# Patient Record
Sex: FEMALE | Race: OTHER | HISPANIC OR LATINO | Employment: FULL TIME | URBAN - METROPOLITAN AREA
[De-identification: names, ages, dates, MRNs, and addresses within clinical notes are randomized per-mention and may not be internally consistent; named-entity substitution may affect disease eponyms.]

---

## 2017-01-04 ENCOUNTER — TELEPHONE (OUTPATIENT)
Dept: OBGYN | Facility: CLINIC | Age: 22
End: 2017-01-04

## 2017-01-04 NOTE — TELEPHONE ENCOUNTER
Pt called triage line stating she has had a cold for 3 days, pt states needs a note for work or a prescription. Called pt unable to contact her left message to call back.

## 2017-01-11 ENCOUNTER — ROUTINE PRENATAL (OUTPATIENT)
Dept: OBGYN | Facility: CLINIC | Age: 22
End: 2017-01-11

## 2017-01-11 VITALS — WEIGHT: 217 LBS | SYSTOLIC BLOOD PRESSURE: 124 MMHG | BODY MASS INDEX: 33.98 KG/M2 | DIASTOLIC BLOOD PRESSURE: 72 MMHG

## 2017-01-11 DIAGNOSIS — Z34.82 ENCOUNTER FOR SUPERVISION OF OTHER NORMAL PREGNANCY IN SECOND TRIMESTER: Primary | ICD-10-CM

## 2017-01-11 PROCEDURE — 90040 PR PRENATAL FOLLOW UP: CPT | Performed by: PHYSICIAN ASSISTANT

## 2017-01-11 NOTE — MR AVS SNAPSHOT
Lisbeth Hernández   2017 4:15 PM   Routine Prenatal   MRN: [id number]    [facility]   Dept Phone:  [phone]    Description:  Female : 1995   Provider:  DALI Funez           Allergies as of 2017     Allergen Noted Reactions    Sulfa Drugs 2013       Family history of allergy, pt has not taken      Vital Signs     Blood Pressure Weight Last Menstrual Period Smoking Status          124/72 mmHg 98.431 kg (217 lb) 2016 (Approximate) Former Smoker        Basic Information     Date Of Birth Sex Race Ethnicity Preferred Language    1995 Female  or   Origin (Haitian,French,Croatian,Citizen of [place], etc) English      Your appointments     2017  4:15 PM   OB Follow Up with DALI Funez   [facility] [address]  [facility] [id number] [phone]              Problem List              ICD-10-CM Priority Class Noted - Resolved    Supervision of normal pregnancy in second trimester Z34.92   10/18/2016 - Present    Low grade squamous intraepithelial lesion on cytologic smear of cervix (lgsil) - for repeat pap/colpo PRN 6 weeks PP R87.612   10/31/2016 - Present      Health Maintenance        Date Due Completion Dates    IMM HEP B VACCINE (1 of 3 - Primary Series) 1995 ---    IMM HEP A VACCINE (1 of 2 - Standard Series) 1996 ---    IMM HPV VACCINE (1 of 3 - Female 3 Dose Series) 2006 ---    IMM VARICELLA (CHICKENPOX) VACCINE (1 of 2 - 2 Dose Adolescent Series) 2008 ---    IMM MENINGOCOCCAL VACCINE (MCV4) (1 of 1) 2011 ---    IMM INFLUENZA (1) 2016 ---    PAP SMEAR 10/18/2019 10/18/2016    IMM DTaP/Tdap/Td Vaccine (2 - Td) 2026            Current Immunizations     Tdap Vaccine 2016  4:18 PM      Below and/or attached are the medications your provider expects you to take. Review all of your home medications and newly ordered medications with  your provider and/or pharmacist. Follow medication instructions as directed by your provider and/or pharmacist. Please keep your medication list with you and share with your provider. Update the information when medications are discontinued, doses are changed, or new medications (including over-the-counter products) are added; and carry medication information at all times in the event of emergency situations     Allergies:  SULFA DRUGS - (reactions not documented)               Medications  Valid as of: January 11, 2017 -  4:08 PM    Generic Name Brand Name Tablet Size Instructions for use    Prenatal Vit-Fe Fumarate-FA   Take  by mouth.        .                 Medicines prescribed today were sent to:     [facility]/PHARMACY #[id number] - [place], [place] - [address] [place] [address]    Phone: [phone] Fax: [phone]    Open 24 Hours?: No      Medication refill instructions:       If your prescription bottle indicates you have medication refills left, it is not necessary to call your provider’s office. Please contact your pharmacy and they will refill your medication.    If your prescription bottle indicates you do not have any refills left, you may request refills at any time through one of the following ways: The online [employer] system (except Urgent Care), by calling your provider’s office, or by asking your pharmacy to contact your provider’s office with a refill request. Medication refills are processed only during regular business hours and may not be available until the next business day. Your provider may request additional information or to have a follow-up visit with you prior to refilling your medication.   *Please Note: Medication refills are assigned a new Rx number when refilled electronically. Your pharmacy may indicate that no refills were authorized even though a new prescription for the same medication is available at the pharmacy. Please request the  medicine by name with the pharmacy before contacting your provider for a refill.        Other Notes About Your Plan     TODD Larios           India Property Online Access Code: [email or web address]  Expires: 1/20/2017  9:01 AM    India Property Online  A secure, online tool to manage your health information     [employer] India Property Online® is a secure, online tool that connects you to your personalized health information from the privacy of your home -- day or night - making it very easy for you to manage your healthcare. Once the activation process is completed, you can even access your medical information using the India Property Online rogelio, which is available for free in the Apple Rogelio store or Google Play store.     India Property Online provides the following levels of access (as shown below):   My Chart Features   [facility] Doctor [facility]  Specialists [facility]  Urgent  Care Non-[facility]  Primary Care  Doctor   Email your healthcare team securely and privately 24/7 X X X    Manage appointments: schedule your next appointment; view details of past/upcoming appointments X      Request prescription refills. X      View recent personal medical records, including lab and immunizations X X X X   View health record, including health history, allergies, medications X X X X   Read reports about your outpatient visits, procedures, consult and ER notes X X X X   See your discharge summary, which is a recap of your hospital and/or ER visit that includes your diagnosis, lab results, and care plan. X X       How to register for India Property Online:  1. Go to  [email or web address].  2. Click on the Sign Up Now box, which takes you to the New Member Sign Up page. You will need to provide the following information:  a. Enter your India Property Online Access Code exactly as it appears at the top of this page. (You will not need to use this code after you’ve completed the sign-up process. If you do not sign up before the expiration date, you must request a new code.)   b. Enter your date of  birth.   c. Enter your home email address.   d. Click Submit, and follow the next screen’s instructions.  3. Create a Carbon60 Networks ID. This will be your [employer] login ID and cannot be changed, so think of one that is secure and easy to remember.  4. Create a Merus Labst password. You can change your password at any time.  5. Enter your Password Reset Question and Answer. This can be used at a later time if you forget your password.   6. Enter your e-mail address. This allows you to receive e-mail notifications when new information is available in [employer]60 Networks.  7. Click Sign Up. You can now view your health information.    For assistance activating your [employer] account, call [phone]

## 2017-01-11 NOTE — PROGRESS NOTES
Pt. Here for OB/FU today. Reports Good FM.   Good # [phone]  Pt states having pelvic pain.   Pt states was seen at [facility] on 1/1/17 for abdominal pain.   Pharmacy verified.

## 2017-01-12 NOTE — PROGRESS NOTES
Pt has no complaints with cramping, UCs, VB, LOF, but pt has had cough and cold symptoms for past week. Taking some medication and feeling somewhat better. +FM. PTL precautions reviewed. RTC 2 wk or sooner prn.

## 2017-01-24 ENCOUNTER — ROUTINE PRENATAL (OUTPATIENT)
Dept: OBGYN | Facility: CLINIC | Age: 22
End: 2017-01-24

## 2017-01-24 VITALS — DIASTOLIC BLOOD PRESSURE: 66 MMHG | BODY MASS INDEX: 33.82 KG/M2 | SYSTOLIC BLOOD PRESSURE: 108 MMHG | WEIGHT: 216 LBS

## 2017-01-24 DIAGNOSIS — Z34.82 ENCOUNTER FOR SUPERVISION OF OTHER NORMAL PREGNANCY IN SECOND TRIMESTER: ICD-10-CM

## 2017-01-24 PROCEDURE — 90040 PR PRENATAL FOLLOW UP: CPT | Performed by: NURSE PRACTITIONER

## 2017-01-24 NOTE — MR AVS SNAPSHOT
Lisbeth Hernández   2017 4:00 PM   Routine Prenatal   MRN: [id number]    Department[facility]   Dept Phone:  [phone]    Description:  Female : 1995   Provider:  Seda Perez D.N.P.           Allergies as of 2017     Allergen Noted Reactions    Sulfa Drugs 2013       Family history of allergy, pt has not taken      You were diagnosed with     Encounter for supervision of other normal pregnancy in second trimester   [[id number]]         Vital Signs     Blood Pressure Weight Last Menstrual Period Smoking Status          108/66 mmHg 97.977 kg (216 lb) 2016 (Approximate) Former Smoker        Basic Information     Date Of Birth Sex Race Ethnicity Preferred Language    1995 Female  or   Origin (Kazakh,Montserratian,Northern Irish,Jose Manuel, etc) English      Problem List              ICD-10-CM Priority Class Noted - Resolved    Supervision of normal pregnancy in second trimester Z34.92   10/18/2016 - Present    Low grade squamous intraepithelial lesion on cytologic smear of cervix (lgsil) - for repeat pap/colpo PRN 6 weeks PP R87.612   10/31/2016 - Present      Health Maintenance        Date Due Completion Dates    IMM HEP B VACCINE (1 of 3 - Primary Series) 1995 ---    IMM HEP A VACCINE (1 of 2 - Standard Series) 1996 ---    IMM HPV VACCINE (1 of 3 - Female 3 Dose Series) 2006 ---    IMM VARICELLA (CHICKENPOX) VACCINE (1 of 2 - 2 Dose Adolescent Series) 2008 ---    IMM MENINGOCOCCAL VACCINE (MCV4) (1 of 1) 2011 ---    IMM INFLUENZA (1) 2016 ---    PAP SMEAR 10/18/2019 10/18/2016    IMM DTaP/Tdap/Td Vaccine (2 - Td) 2026            Current Immunizations     Tdap Vaccine 2016  4:18 PM      Below and/or attached are the medications your provider expects you to take. Review all of your home medications and newly ordered medications with your provider and/or pharmacist. Follow medication instructions as  directed by your provider and/or pharmacist. Please keep your medication list with you and share with your provider. Update the information when medications are discontinued, doses are changed, or new medications (including over-the-counter products) are added; and carry medication information at all times in the event of emergency situations     Allergies:  SULFA DRUGS - (reactions not documented)               Medications  Valid as of: January 24, 2017 -  4:16 PM    Generic Name Brand Name Tablet Size Instructions for use    Prenatal Vit-Fe Fumarate-FA   Take  by mouth.        .                 Medicines prescribed today were sent to:     [facility]/PHARMACY #[id number] - [place], [place] - [address] [place] [address]    Phone: [phone] Fax: [phone]    Open 24 Hours?: No      Medication refill instructions:       If your prescription bottle indicates you have medication refills left, it is not necessary to call your provider’s office. Please contact your pharmacy and they will refill your medication.    If your prescription bottle indicates you do not have any refills left, you may request refills at any time through one of the following ways: The online [employer] system (except Urgent Care), by calling your provider’s office, or by asking your pharmacy to contact your provider’s office with a refill request. Medication refills are processed only during regular business hours and may not be available until the next business day. Your provider may request additional information or to have a follow-up visit with you prior to refilling your medication.   *Please Note: Medication refills are assigned a new Rx number when refilled electronically. Your pharmacy may indicate that no refills were authorized even though a new prescription for the same medication is available at the pharmacy. Please request the medicine by name with the pharmacy before contacting your provider  for a refill.        Other Notes About Your Plan     TODD Larios           Nitro PDF Access Code: [email or web address]  Expires: 2/20/2017  9:33 AM    Nitro PDF  A secure, online tool to manage your health information     [employer]’s Nitro PDF® is a secure, online tool that connects you to your personalized health information from the privacy of your home -- day or night - making it very easy for you to manage your healthcare. Once the activation process is completed, you can even access your medical information using the Nitro PDF rogelio, which is available for free in the Apple Rogelio store or Google Play store.     Nitro PDF provides the following levels of access (as shown below):   My Chart Features   [facility] Doctor [facility]  Specialists [facility]  Urgent  Care Non-[facility]  Primary Care  Doctor   Email your healthcare team securely and privately 24/7 X X X    Manage appointments: schedule your next appointment; view details of past/upcoming appointments X      Request prescription refills. X      View recent personal medical records, including lab and immunizations X X X X   View health record, including health history, allergies, medications X X X X   Read reports about your outpatient visits, procedures, consult and ER notes X X X X   See your discharge summary, which is a recap of your hospital and/or ER visit that includes your diagnosis, lab results, and care plan. X X       How to register for Nitro PDF:  1. Go to  [email or web address].  2. Click on the Sign Up Now box, which takes you to the New Member Sign Up page. You will need to provide the following information:  a. Enter your Nitro PDF Access Code exactly as it appears at the top of this page. (You will not need to use this code after you’ve completed the sign-up process. If you do not sign up before the expiration date, you must request a new code.)   b. Enter your date of birth.   c. Enter your home email address.   d. Click Submit, and  follow the next screen’s instructions.  3. Create a Altocomt ID. This will be your Breather login ID and cannot be changed, so think of one that is secure and easy to remember.  4. Create a Altocomt password. You can change your password at any time.  5. Enter your Password Reset Question and Answer. This can be used at a later time if you forget your password.   6. Enter your e-mail address. This allows you to receive e-mail notifications when new information is available in Breather.  7. Click Sign Up. You can now view your health information.    For assistance activating your Breather account, call [phone]

## 2017-01-25 NOTE — PROGRESS NOTES
S) Pt is a 21 y.o.   at 32w6d  gestation. Routine prenatal care today. C/o leg cramps and feeling light headed at times. No other reported issues.  Reports good  fetal movement. Denies cramping, bleeding or leaking of fluid. Denies dysuria. Generally feels well today. Good self-care activities identified. Denies headaches.     O) see flow         Filed Vitals:    17 1603   BP: 108/66   Weight: 97.977 kg (216 lb)           Lab: normal prenatal panel       Pertinent ultrasound - normal fetal survey            A) IUP at 32w6d       S=D         Patient Active Problem List    Diagnosis Date Noted   • Low grade squamous intraepithelial lesion on cytologic smear of cervix (lgsil) - for repeat pap/colpo PRN 6 weeks PP 10/31/2016   • Supervision of normal pregnancy in second trimester 10/18/2016     P) s/s  labor vs general discomforts. Fetal movements reviewed. General ed and anticipatory guidance. Nutrition/exercise/vitamin. Plans breast. Plans pp contraception - states is doing her research. Continue PNV. Small frequent meals with protein, increase H20. Leg cramp relief measures.

## 2017-01-25 NOTE — PROGRESS NOTES
Pt here today for OB follow up  Pt states having leg cramps, and dizziness.   Reports +FM  WT:216lb  BP:108/66  Good # [phone]

## 2017-02-07 ENCOUNTER — ROUTINE PRENATAL (OUTPATIENT)
Dept: OBGYN | Facility: CLINIC | Age: 22
End: 2017-02-07

## 2017-02-07 VITALS — WEIGHT: 219 LBS | DIASTOLIC BLOOD PRESSURE: 60 MMHG | BODY MASS INDEX: 34.29 KG/M2 | SYSTOLIC BLOOD PRESSURE: 112 MMHG

## 2017-02-07 DIAGNOSIS — Z34.02 ENCOUNTER FOR SUPERVISION OF NORMAL FIRST PREGNANCY IN SECOND TRIMESTER: ICD-10-CM

## 2017-02-07 LAB
APPEARANCE UR: NORMAL
BILIRUB UR STRIP-MCNC: NORMAL MG/DL
COLOR UR AUTO: NORMAL
GLUCOSE UR STRIP.AUTO-MCNC: NEGATIVE MG/DL
KETONES UR STRIP.AUTO-MCNC: NEGATIVE MG/DL
LEUKOCYTE ESTERASE UR QL STRIP.AUTO: NORMAL
NITRITE UR QL STRIP.AUTO: NEGATIVE
PH UR STRIP.AUTO: 6.5 [PH] (ref 5–8)
PROT UR QL STRIP: NORMAL MG/DL
RBC UR QL AUTO: NEGATIVE
SP GR UR STRIP.AUTO: 1.02
UROBILINOGEN UR STRIP-MCNC: NORMAL MG/DL

## 2017-02-07 PROCEDURE — 81002 URINALYSIS NONAUTO W/O SCOPE: CPT | Performed by: NURSE PRACTITIONER

## 2017-02-07 PROCEDURE — 90040 PR PRENATAL FOLLOW UP: CPT | Performed by: NURSE PRACTITIONER

## 2017-02-07 NOTE — MR AVS SNAPSHOT
Lisbeth Hernández   2017 4:15 PM   Routine Prenatal   MRN: [id number]    Department[facility]   Dept Phone:  [phone]    Description:  Female : 1995   Provider:  Seda Perez D.N.P.           Allergies as of 2017     Allergen Noted Reactions    Sulfa Drugs 2013       Family history of allergy, pt has not taken      You were diagnosed with     Encounter for supervision of normal first pregnancy in second trimester   [[id number]]         Vital Signs     Blood Pressure Weight Last Menstrual Period Smoking Status          112/60 mmHg 99.338 kg (219 lb) 2016 (Approximate) Former Smoker        Basic Information     Date Of Birth Sex Race Ethnicity Preferred Language    1995 Female  or   Origin (Comoran,Omani,Angolan,Citizen of [place], etc) English      Problem List              ICD-10-CM Priority Class Noted - Resolved    Supervision of normal pregnancy in second trimester Z34.92   10/18/2016 - Present    Low grade squamous intraepithelial lesion on cytologic smear of cervix (lgsil) - for repeat pap/colpo PRN 6 weeks PP R87.612   10/31/2016 - Present      Health Maintenance        Date Due Completion Dates    IMM HEP B VACCINE (1 of 3 - Primary Series) 1995 ---    IMM HEP A VACCINE (1 of 2 - Standard Series) 1996 ---    IMM HPV VACCINE (1 of 3 - Female 3 Dose Series) 2006 ---    IMM VARICELLA (CHICKENPOX) VACCINE (1 of 2 - 2 Dose Adolescent Series) 2008 ---    IMM MENINGOCOCCAL VACCINE (MCV4) (1 of 1) 2011 ---    IMM INFLUENZA (1) 2016 ---    PAP SMEAR 10/18/2019 10/18/2016    IMM DTaP/Tdap/Td Vaccine (2 - Td) 2026            Results     POCT Urinalysis                   Current Immunizations     Tdap Vaccine 2016  4:18 PM      Below and/or attached are the medications your provider expects you to take. Review all of your home medications and newly ordered medications with your provider and/or  pharmacist. Follow medication instructions as directed by your provider and/or pharmacist. Please keep your medication list with you and share with your provider. Update the information when medications are discontinued, doses are changed, or new medications (including over-the-counter products) are added; and carry medication information at all times in the event of emergency situations     Allergies:  SULFA DRUGS - (reactions not documented)               Medications  Valid as of: February 07, 2017 -  4:34 PM    Generic Name Brand Name Tablet Size Instructions for use    Prenatal Vit-Fe Fumarate-FA   Take  by mouth.        .                 Medicines prescribed today were sent to:     [facility]/PHARMACY #[id number] - [place], [place] - [address] [place] [address]    Phone: [phone] Fax: [phone]    Open 24 Hours?: No      Medication refill instructions:       If your prescription bottle indicates you have medication refills left, it is not necessary to call your provider’s office. Please contact your pharmacy and they will refill your medication.    If your prescription bottle indicates you do not have any refills left, you may request refills at any time through one of the following ways: The online [employer] system (except Urgent Care), by calling your provider’s office, or by asking your pharmacy to contact your provider’s office with a refill request. Medication refills are processed only during regular business hours and may not be available until the next business day. Your provider may request additional information or to have a follow-up visit with you prior to refilling your medication.   *Please Note: Medication refills are assigned a new Rx number when refilled electronically. Your pharmacy may indicate that no refills were authorized even though a new prescription for the same medication is available at the pharmacy. Please request the medicine by name with  the pharmacy before contacting your provider for a refill.        Other Notes About Your Plan     TODD Larios           YouLike Access Code: [email or web address]  Expires: 2/20/2017  9:33 AM    YouLike  A secure, online tool to manage your health information     [employer]’s YouLike® is a secure, online tool that connects you to your personalized health information from the privacy of your home -- day or night - making it very easy for you to manage your healthcare. Once the activation process is completed, you can even access your medical information using the YouLike rogelio, which is available for free in the Apple Rogelio store or Google Play store.     YouLike provides the following levels of access (as shown below):   My Chart Features   [facility] Doctor [facility]  Specialists [facility]  Urgent  Care Non-[facility]  Primary Care  Doctor   Email your healthcare team securely and privately 24/7 X X X    Manage appointments: schedule your next appointment; view details of past/upcoming appointments X      Request prescription refills. X      View recent personal medical records, including lab and immunizations X X X X   View health record, including health history, allergies, medications X X X X   Read reports about your outpatient visits, procedures, consult and ER notes X X X X   See your discharge summary, which is a recap of your hospital and/or ER visit that includes your diagnosis, lab results, and care plan. X X       How to register for YouLike:  1. Go to  [email or web address]  2. Click on the Sign Up Now box, which takes you to the New Member Sign Up page. You will need to provide the following information:  a. Enter your YouLike Access Code exactly as it appears at the top of this page. (You will not need to use this code after you’ve completed the sign-up process. If you do not sign up before the expiration date, you must request a new code.)   b. Enter your date of birth.   c. Enter your  home email address.   d. Click Submit, and follow the next screen’s instructions.  3. Create a Built Int ID. This will be your Icarus Ascending login ID and cannot be changed, so think of one that is secure and easy to remember.  4. Create a Built Int password. You can change your password at any time.  5. Enter your Password Reset Question and Answer. This can be used at a later time if you forget your password.   6. Enter your e-mail address. This allows you to receive e-mail notifications when new information is available in Icarus Ascending.  7. Click Sign Up. You can now view your health information.    For assistance activating your Icarus Ascending account, call [phone]

## 2017-02-08 NOTE — PROGRESS NOTES
"Pt here today for OB follow up  Reports +FM  WT: 219 lb  BP: 112/60  Pt states still feeling dizzy and nauseated, also states that every time she \"pees\" she feels pelvic pressure.   Good # [phone]    "

## 2017-02-08 NOTE — PROGRESS NOTES
S) Pt is a 21 y.o.   at 34w6d  gestation. Routine prenatal care today. States pressure when urinating. Denies dysuria. Denies vaginal itching, burning, odor or abnormal discharge.  Reports good  fetal movement. Denies cramping, bleeding or leaking of fluid. Generally feels well today. Good self-care activities identified. Denies headaches. Supportive FOB present and involved    O) see flow         Filed Vitals:    17 1611   BP: 112/60   Weight: 99.338 kg (219 lb)           Lab: normal prenatal panel, normal glucose.        Pertinent ultrasound - normal fetal survey         Clean catch UA no nitrites or blood. Small leuks.       A) IUP at 34w6d       S=D         Patient Active Problem List    Diagnosis Date Noted   • Low grade squamous intraepithelial lesion on cytologic smear of cervix (lgsil) - for repeat pap/colpo PRN 6 weeks PP 10/31/2016   • Supervision of normal pregnancy in second trimester 10/18/2016     Likely pressure when urinating from vertex position. Not likely UTI as patient otherwise has no sx.     P) s/s ptl vs general discomforts. Fetal movements reviewed. General ed and anticipatory guidance. Nutrition/exercise/vitamin. Plans breast. Continue PNV. Anticipate GBS next visit.

## 2017-02-17 ENCOUNTER — ROUTINE PRENATAL (OUTPATIENT)
Dept: OBGYN | Facility: CLINIC | Age: 22
End: 2017-02-17

## 2017-02-17 ENCOUNTER — HOSPITAL ENCOUNTER (OUTPATIENT)
Facility: MEDICAL CENTER | Age: 22
End: 2017-02-17
Attending: PHYSICIAN ASSISTANT
Payer: COMMERCIAL

## 2017-02-17 VITALS — BODY MASS INDEX: 34.45 KG/M2 | WEIGHT: 220 LBS | DIASTOLIC BLOOD PRESSURE: 80 MMHG | SYSTOLIC BLOOD PRESSURE: 122 MMHG

## 2017-02-17 DIAGNOSIS — Z34.02 ENCOUNTER FOR SUPERVISION OF NORMAL FIRST PREGNANCY IN SECOND TRIMESTER: ICD-10-CM

## 2017-02-17 PROCEDURE — 90040 PR PRENATAL FOLLOW UP: CPT | Performed by: PHYSICIAN ASSISTANT

## 2017-02-17 PROCEDURE — 87653 STREP B DNA AMP PROBE: CPT

## 2017-02-17 NOTE — MR AVS SNAPSHOT
Lisbeth Hernández   2017 4:30 PM   Routine Prenatal   MRN: [id number]    [facility]   Dept Phone:  [phone]    Description:  Female : 1995   Provider:  DALI Funez           Allergies as of 2017     Allergen Noted Reactions    Sulfa Drugs 2013       Family history of allergy, pt has not taken      You were diagnosed with     Encounter for supervision of normal first pregnancy in second trimester   [189668]         Vital Signs     Blood Pressure Weight Last Menstrual Period Smoking Status          122/80 mmHg 99.791 kg (220 lb) 2016 (Approximate) Former Smoker        Basic Information     Date Of Birth Sex Race Ethnicity Preferred Language    1995 Female  or   Origin (Chinese,Malawian,Malawian,Citizen of [place], etc) English      Your appointments     2017  4:30 PM   OB Follow Up with Seda Perez D.N.P.   [facility] [address]  [place] [address]   [phone]            Mar 03, 2017  4:00 PM   OB Follow Up with Kaelyn Vigil C.N.M.   [facility] [address]  [place] [address]   [phone]            Mar 10, 2017  3:45 PM   OB Follow Up with Kaelyn Vigil C.N.M.   [facility] [address]  [place] [address]   [phone]              Problem List              ICD-10-CM Priority Class Noted - Resolved    Supervision of normal pregnancy in second trimester Z34.92   10/18/2016 - Present    Low grade squamous intraepithelial lesion on cytologic smear of cervix (lgsil) - for repeat pap/colpo PRN 6 weeks PP R87.612   10/31/2016 - Present      Health Maintenance        Date Due Completion Dates    IMM HEP B VACCINE (1 of 3 - Primary Series) 1995 ---    IMM HEP A VACCINE (1 of 2 - Standard Series) 1996 ---    IMM HPV VACCINE (1 of 3 - Female 3 Dose Series) 2006 ---    IMM VARICELLA (CHICKENPOX) VACCINE  (1 of 2 - 2 Dose Adolescent Series) 5/24/2008 ---    IMM MENINGOCOCCAL VACCINE (MCV4) (1 of 1) 5/24/2011 ---    IMM INFLUENZA (1) 9/1/2016 ---    PAP SMEAR 10/18/2019 10/18/2016    IMM DTaP/Tdap/Td Vaccine (2 - Td) 12/28/2026 12/28/2016            Current Immunizations     Tdap Vaccine 12/28/2016  4:18 PM      Below and/or attached are the medications your provider expects you to take. Review all of your home medications and newly ordered medications with your provider and/or pharmacist. Follow medication instructions as directed by your provider and/or pharmacist. Please keep your medication list with you and share with your provider. Update the information when medications are discontinued, doses are changed, or new medications (including over-the-counter products) are added; and carry medication information at all times in the event of emergency situations     Allergies:  SULFA DRUGS - (reactions not documented)               Medications  Valid as of: February 17, 2017 -  4:50 PM    Generic Name Brand Name Tablet Size Instructions for use    Prenatal Vit-Fe Fumarate-FA   Take  by mouth.        .                 Medicines prescribed today were sent to:     [facility]/PHARMACY #[id number] - [place], [place] - [address] [address]    Phone: [phone] Fax: [phone]    Open 24 Hours?: No      Medication refill instructions:       If your prescription bottle indicates you have medication refills left, it is not necessary to call your provider’s office. Please contact your pharmacy and they will refill your medication.    If your prescription bottle indicates you do not have any refills left, you may request refills at any time through one of the following ways: The online C[employer] system (except Urgent Care), by calling your provider’s office, or by asking your pharmacy to contact your provider’s office with a refill request. Medication refills are processed only  during regular business hours and may not be available until the next business day. Your provider may request additional information or to have a follow-up visit with you prior to refilling your medication.   *Please Note: Medication refills are assigned a new Rx number when refilled electronically. Your pharmacy may indicate that no refills were authorized even though a new prescription for the same medication is available at the pharmacy. Please request the medicine by name with the pharmacy before contacting your provider for a refill.        Your To Do List     Future Labs/Procedures Complete By Expires    GRP B STREP, BY PCR (GOMEZ BROTH)  As directed 2/17/2018      Other Notes About Your Plan     TODD Larios           VHX Access Code: [email or web address]  Expires: 2/20/2017  9:33 AM    VHX  A secure, online tool to manage your health information     [employer]s VHX® is a secure, online tool that connects you to your personalized health information from the privacy of your home -- day or night - making it very easy for you to manage your healthcare. Once the activation process is completed, you can even access your medical information using the VHX rogelio, which is available for free in the Apple Rogelio store or Google Play store.     VHX provides the following levels of access (as shown below):   My Chart Features   [facility] Doctor [facility]  Specialists [facility]  Urgent  Care Non-[facility]  Primary Care  Doctor   Email your healthcare team securely and privately 24/7 X X X    Manage appointments: schedule your next appointment; view details of past/upcoming appointments X      Request prescription refills. X      View recent personal medical records, including lab and immunizations X X X X   View health record, including health history, allergies, medications X X X X   Read reports about your outpatient visits, procedures, consult and ER notes X X X X   See your discharge  summary, which is a recap of your hospital and/or ER visit that includes your diagnosis, lab results, and care plan. X X       How to register for [employer]:  1. Go to  [email or web address].  2. Click on the Sign Up Now box, which takes you to the New Member Sign Up page. You will need to provide the following information:  a. Enter your [employer] Access Code exactly as it appears at the top of this page. (You will not need to use this code after you’ve completed the sign-up process. If you do not sign up before the expiration date, you must request a new code.)   b. Enter your date of birth.   c. Enter your home email address.   d. Click Submit, and follow the next screen’s instructions.  3. Create a [employer] ID. This will be your [employer] login ID and cannot be changed, so think of one that is secure and easy to remember.  4. Create a [employer] password. You can change your password at any time.  5. Enter your Password Reset Question and Answer. This can be used at a later time if you forget your password.   6. Enter your e-mail address. This allows you to receive e-mail notifications when new information is available in [employer].  7. Click Sign Up. You can now view your health information.    For assistance activating your [employer] account, call [phone]

## 2017-02-18 LAB — GP B STREP DNA SPEC QL NAA+PROBE: NEGATIVE

## 2017-02-18 NOTE — PROGRESS NOTES
Pt has no complaints with regular or strong UCs, Vb, LOF. +FM. GBS done today. Labor precautions reviewed. Daily FKC and walks recommended. Cervix: 2-3/80/0, post, soft, vtx. RTC 1 wk or sooner prn.

## 2017-02-24 ENCOUNTER — ROUTINE PRENATAL (OUTPATIENT)
Dept: OBGYN | Facility: CLINIC | Age: 22
End: 2017-02-24

## 2017-02-24 VITALS — SYSTOLIC BLOOD PRESSURE: 120 MMHG | DIASTOLIC BLOOD PRESSURE: 76 MMHG | BODY MASS INDEX: 34.45 KG/M2 | WEIGHT: 220 LBS

## 2017-02-24 DIAGNOSIS — Z34.02 ENCOUNTER FOR SUPERVISION OF NORMAL FIRST PREGNANCY IN SECOND TRIMESTER: ICD-10-CM

## 2017-02-24 PROCEDURE — 90040 PR PRENATAL FOLLOW UP: CPT | Performed by: NURSE PRACTITIONER

## 2017-02-24 NOTE — Clinical Note
February 24, 2017            Lisbeth Hernández is pregnant with an estimated delivery date of 3/15/17 at this time. Please excuse for today, she had an appointment in this clinic.         Thank you,          Seda Perez D.N.P.    Electronically Signed

## 2017-02-25 NOTE — PROGRESS NOTES
S) Pt is a 21 y.o.   at 37w2d  gestation. Routine prenatal care today. Patient reports general discomforts today, bilateral pedal edema. Feels lightheaded at times. Occasional cramping like menses but nothing progressive.  Reports good  fetal movement. Denies bleeding or leaking of fluid. Denies dysuria.      O) see flow         Filed Vitals:    17 1632   BP: 120/76   Weight: 99.791 kg (220 lb)           Lab:  Recent Results (from the past 336 hour(s))   GRP B STREP, BY PCR (GOMEZ BROTH)    Collection Time: 17  4:49 PM   Result Value Ref Range    Strep Gp B DNA PCR Negative Negative          Pertinent ultrasound -        Normal fetal survey     A) IUP at 37w2d       S=D         Patient Active Problem List    Diagnosis Date Noted   • Low grade squamous intraepithelial lesion on cytologic smear of cervix (lgsil) - for repeat pap/colpo PRN 6 weeks PP 10/31/2016   • Supervision of normal pregnancy in second trimester 10/18/2016     P) s/s labor vs general discomforts. Fetal movements reviewed. General ed and anticipatory guidance. Nutrition/exercise/vitamin. Plans breast. Continue PNV.

## 2017-02-25 NOTE — PROGRESS NOTES
OB f/u   + fetal movement.  No VB, LOF or UC's.  Good phone # [phone]  Preferred pharmacy confirmed.  GBS negative  Pt c/o swelling in her legs (bilateral) and some HA; denies any vision changes; also states she's been having dizzy spells and feeling nauseous  Pt states has experienced some  Lower back pain

## 2017-02-28 ENCOUNTER — ROUTINE PRENATAL (OUTPATIENT)
Dept: OBGYN | Facility: CLINIC | Age: 22
End: 2017-02-28

## 2017-02-28 VITALS — BODY MASS INDEX: 35.7 KG/M2 | SYSTOLIC BLOOD PRESSURE: 110 MMHG | WEIGHT: 228 LBS | DIASTOLIC BLOOD PRESSURE: 66 MMHG

## 2017-02-28 DIAGNOSIS — Z34.02 ENCOUNTER FOR SUPERVISION OF NORMAL FIRST PREGNANCY IN SECOND TRIMESTER: Primary | ICD-10-CM

## 2017-02-28 PROCEDURE — 90040 PR PRENATAL FOLLOW UP: CPT | Performed by: NURSE PRACTITIONER

## 2017-02-28 NOTE — MR AVS SNAPSHOT
Lisbeth Hernández   2017 4:00 PM   Routine Prenatal   MRN: [id number]    [facility]   Dept Phone:  [phone]    Description:  Female : 1995   Provider:  SEKOU Pham           Allergies as of 2017     Allergen Noted Reactions    Sulfa Drugs 2013       Family history of allergy, pt has not taken      You were diagnosed with     Encounter for supervision of normal first pregnancy in second trimester   [932308]         Vital Signs     Blood Pressure Weight Last Menstrual Period Smoking Status          110/66 mmHg 103.42 kg (228 lb) 2016 (Approximate) Former Smoker        Basic Information     Date Of Birth Sex Race Ethnicity Preferred Language    1995 Female  or   Origin (Amharic,Venezuelan,Malagasy,Jose Manuel, etc) English      Your appointments     Mar 10, 2017  3:45 PM   OB Follow Up with Kaelyn Vigil C.N.M.   [facility] [address] [address] [phone]              Problem List              ICD-10-CM Priority Class Noted - Resolved    Supervision of normal pregnancy in second trimester Z34.92   10/18/2016 - Present    Low grade squamous intraepithelial lesion on cytologic smear of cervix (lgsil) - for repeat pap/colpo PRN 6 weeks PP R87.612   10/31/2016 - Present      Health Maintenance        Date Due Completion Dates    IMM HEP B VACCINE (1 of 3 - Primary Series) 1995 ---    IMM HEP A VACCINE (1 of 2 - Standard Series) 1996 ---    IMM HPV VACCINE (1 of 3 - Female 3 Dose Series) 2006 ---    IMM VARICELLA (CHICKENPOX) VACCINE (1 of 2 - 2 Dose Adolescent Series) 2008 ---    IMM MENINGOCOCCAL VACCINE (MCV4) (1 of 1) 2011 ---    IMM INFLUENZA (1) 2016 ---    PAP SMEAR 10/18/2019 10/18/2016    IMM DTaP/Tdap/Td Vaccine (2 - Td) 2026            Current Immunizations     Tdap Vaccine 2016  4:18 PM      Below and/or attached  are the medications your provider expects you to take. Review all of your home medications and newly ordered medications with your provider and/or pharmacist. Follow medication instructions as directed by your provider and/or pharmacist. Please keep your medication list with you and share with your provider. Update the information when medications are discontinued, doses are changed, or new medications (including over-the-counter products) are added; and carry medication information at all times in the event of emergency situations     Allergies:  SULFA DRUGS - (reactions not documented)               Medications  Valid as of: February 28, 2017 -  4:00 PM    Generic Name Brand Name Tablet Size Instructions for use    Prenatal Vit-Fe Fumarate-FA   Take  by mouth.        .                 Medicines prescribed today were sent to:     [facility]/PHARMACY #[id number] - [place], [place] - [address] [address]    Phone: [phone] Fax: [phone]    Open 24 Hours?: No      Medication refill instructions:       If your prescription bottle indicates you have medication refills left, it is not necessary to call your provider’s office. Please contact your pharmacy and they will refill your medication.    If your prescription bottle indicates you do not have any refills left, you may request refills at any time through one of the following ways: The online [employer]here system (except Urgent Care), by calling your provider’s office, or by asking your pharmacy to contact your provider’s office with a refill request. Medication refills are processed only during regular business hours and may not be available until the next business day. Your provider may request additional information or to have a follow-up visit with you prior to refilling your medication.   *Please Note: Medication refills are assigned a new Rx number when refilled electronically. Your pharmacy may indicate that no  refills were authorized even though a new prescription for the same medication is available at the pharmacy. Please request the medicine by name with the pharmacy before contacting your provider for a refill.        Other Notes About Your Plan     TODD Larios           Re[employer] Solutions Access Code: [email or web address]  Expires: 3/23/2017 12:08 PM    Recommerce Solutions  A secure, online tool to manage your health information     [employer]’s Recommerce Solutions® is a secure, online tool that connects you to your personalized health information from the privacy of your home -- day or night - making it very easy for you to manage your healthcare. Once the activation process is completed, you can even access your medical information using the Re[employer] Solutions rogelio, which is available for free in the Apple Rogelio store or Google Play store.     Re[employer] Solutions provides the following levels of access (as shown below):   My Chart Features   [facility] Primary [facility] Doctor [facility]  Specialists [facility]  Urgent  Care Non-[facility]  Primary Care  Doctor   Email your healthcare team securely and privately 24/7 X X X    Manage appointments: schedule your next appointment; view details of past/upcoming appointments X      Request prescription refills. X      View recent personal medical records, including lab and immunizations X X X X   View health record, including health history, allergies, medications X X X X   Read reports about your outpatient visits, procedures, consult and ER notes X X X X   See your discharge summary, which is a recap of your hospital and/or ER visit that includes your diagnosis, lab results, and care plan. X X       How to register for Re[employer] Solutions:  1. Go to  [email or web address].  2. Click on the Sign Up Now box, which takes you to the New Member Sign Up page. You will need to provide the following information:  a. Enter your Re[employer] Solutions Access Code exactly as it appears at the top of this page. (You will not need to use this code after you’ve completed  the sign-up process. If you do not sign up before the expiration date, you must request a new code.)   b. Enter your date of birth.   c. Enter your home email address.   d. Click Submit, and follow the next screen’s instructions.  3. Create a TradingScreen ID. This will be your [employer] login ID and cannot be changed, so think of one that is secure and easy to remember.  4. Create a TradingScreen password. You can change your password at any time.  5. Enter your Password Reset Question and Answer. This can be used at a later time if you forget your password.   6. Enter your e-mail address. This allows you to receive e-mail notifications when new information is available in TradingScreen.  7. Click Sign Up. You can now view your health information.    For assistance activating your TradingScreen account, call [phone]

## 2017-02-28 NOTE — PROGRESS NOTES
Ob F/U  +FM  Pt c/o increased pressure, spotting and a cold, states she has taken Tylenol. Cold remedies hand out given to pt.   Good phone [phone]  GBS-negative

## 2017-03-01 NOTE — PROGRESS NOTES
S:  Pt is  at 37w6d here for routine OB follow up.  Reports occas CTXs.  Reports good FM.  Denies VB, LOF, RUCs, or vaginal DC.     O:  Please see above vitals.        FHTs: 150        Fundal ht: 38 cm        Fetal position: vertex        SVE: 3/80/0        GBS negative -- reviewed w pt.      A:  IUP at 37w6d  Patient Active Problem List    Diagnosis Date Noted   • Low grade squamous intraepithelial lesion on cytologic smear of cervix (lgsil) - for repeat pap/colpo PRN 6 weeks PP 10/31/2016   • Supervision of normal pregnancy in second trimester 10/18/2016       P:  1.  Continue FKCs.         2.  Labor precautions given.  Instructions given on where to go.  Pt receptive to education.         3.  D/w pt IOL policy.  IOL referral sent.        4.  Questions answered.         5.  Encouraged adequate water intake       6.  F/u 1wk

## 2017-03-01 NOTE — PATIENT INSTRUCTIONS
1.  Continue FKCs.         2.  Labor precautions given.  Instructions given on where to go.  Pt receptive to education.         3.  D/w pt IOL policy.  IOL referral sent.        4.  Questions answered.         5.  Encouraged adequate water intake       6.  F/u 1wk

## 2017-03-10 ENCOUNTER — HOSPITAL ENCOUNTER (INPATIENT)
Facility: MEDICAL CENTER | Age: 22
LOS: 2 days | End: 2017-03-12
Attending: OBSTETRICS & GYNECOLOGY | Admitting: OBSTETRICS & GYNECOLOGY
Payer: MEDICAID

## 2017-03-10 ENCOUNTER — ROUTINE PRENATAL (OUTPATIENT)
Dept: OBGYN | Facility: CLINIC | Age: 22
End: 2017-03-10

## 2017-03-10 VITALS — WEIGHT: 221 LBS | DIASTOLIC BLOOD PRESSURE: 84 MMHG | BODY MASS INDEX: 34.61 KG/M2 | SYSTOLIC BLOOD PRESSURE: 122 MMHG

## 2017-03-10 DIAGNOSIS — Z34.02 ENCOUNTER FOR SUPERVISION OF NORMAL FIRST PREGNANCY IN SECOND TRIMESTER: Primary | ICD-10-CM

## 2017-03-10 LAB
BASOPHILS # BLD AUTO: 0.5 % (ref 0–1.8)
BASOPHILS # BLD: 0.06 K/UL (ref 0–0.12)
EOSINOPHIL # BLD AUTO: 0.09 K/UL (ref 0–0.51)
EOSINOPHIL NFR BLD: 0.8 % (ref 0–6.9)
ERYTHROCYTE [DISTWIDTH] IN BLOOD BY AUTOMATED COUNT: 43.9 FL (ref 35.9–50)
HCT VFR BLD AUTO: 43 % (ref 37–47)
HGB BLD-MCNC: 14.8 G/DL (ref 12–16)
HOLDING TUBE BB 8507: NORMAL
IMM GRANULOCYTES # BLD AUTO: 0.28 K/UL (ref 0–0.11)
IMM GRANULOCYTES NFR BLD AUTO: 2.3 % (ref 0–0.9)
LYMPHOCYTES # BLD AUTO: 3.23 K/UL (ref 1–4.8)
LYMPHOCYTES NFR BLD: 27.1 % (ref 22–41)
MCH RBC QN AUTO: 30.5 PG (ref 27–33)
MCHC RBC AUTO-ENTMCNC: 34.4 G/DL (ref 33.6–35)
MCV RBC AUTO: 88.5 FL (ref 81.4–97.8)
MONOCYTES # BLD AUTO: 0.73 K/UL (ref 0–0.85)
MONOCYTES NFR BLD AUTO: 6.1 % (ref 0–13.4)
NEUTROPHILS # BLD AUTO: 7.55 K/UL (ref 2–7.15)
NEUTROPHILS NFR BLD: 63.2 % (ref 44–72)
NRBC # BLD AUTO: 0 K/UL
NRBC BLD AUTO-RTO: 0 /100 WBC
PLATELET # BLD AUTO: 271 K/UL (ref 164–446)
PMV BLD AUTO: 11.8 FL (ref 9–12.9)
RBC # BLD AUTO: 4.86 M/UL (ref 4.2–5.4)
WBC # BLD AUTO: 11.9 K/UL (ref 4.8–10.8)

## 2017-03-10 PROCEDURE — 85025 COMPLETE CBC W/AUTO DIFF WBC: CPT

## 2017-03-10 PROCEDURE — 0KQM0ZZ REPAIR PERINEUM MUSCLE, OPEN APPROACH: ICD-10-PCS | Performed by: OBSTETRICS & GYNECOLOGY

## 2017-03-10 PROCEDURE — 90040 PR PRENATAL FOLLOW UP: CPT | Performed by: NURSE PRACTITIONER

## 2017-03-10 PROCEDURE — 770002 HCHG ROOM/CARE - OB PRIVATE (112)

## 2017-03-10 PROCEDURE — 700111 HCHG RX REV CODE 636 W/ 250 OVERRIDE (IP): Performed by: NURSE PRACTITIONER

## 2017-03-10 PROCEDURE — 700111 HCHG RX REV CODE 636 W/ 250 OVERRIDE (IP)

## 2017-03-10 PROCEDURE — 700101 HCHG RX REV CODE 250

## 2017-03-10 RX ORDER — ALUMINA, MAGNESIA, AND SIMETHICONE 2400; 2400; 240 MG/30ML; MG/30ML; MG/30ML
30 SUSPENSION ORAL EVERY 6 HOURS PRN
Status: DISCONTINUED | OUTPATIENT
Start: 2017-03-10 | End: 2017-03-11 | Stop reason: HOSPADM

## 2017-03-10 RX ORDER — SODIUM CHLORIDE, SODIUM LACTATE, POTASSIUM CHLORIDE, CALCIUM CHLORIDE 600; 310; 30; 20 MG/100ML; MG/100ML; MG/100ML; MG/100ML
INJECTION, SOLUTION INTRAVENOUS
Status: COMPLETED
Start: 2017-03-10 | End: 2017-03-10

## 2017-03-10 RX ORDER — ONDANSETRON 2 MG/ML
4 INJECTION INTRAMUSCULAR; INTRAVENOUS EVERY 6 HOURS PRN
Status: DISCONTINUED | OUTPATIENT
Start: 2017-03-10 | End: 2017-03-12 | Stop reason: HOSPADM

## 2017-03-10 RX ORDER — ROPIVACAINE HYDROCHLORIDE 2 MG/ML
INJECTION, SOLUTION EPIDURAL; INFILTRATION; PERINEURAL
Status: COMPLETED
Start: 2017-03-10 | End: 2017-03-10

## 2017-03-10 RX ORDER — IBUPROFEN 600 MG/1
600 TABLET ORAL EVERY 6 HOURS PRN
Status: DISCONTINUED | OUTPATIENT
Start: 2017-03-10 | End: 2017-03-12 | Stop reason: HOSPADM

## 2017-03-10 RX ORDER — ONDANSETRON 4 MG/1
4 TABLET, ORALLY DISINTEGRATING ORAL EVERY 6 HOURS PRN
Status: DISCONTINUED | OUTPATIENT
Start: 2017-03-10 | End: 2017-03-12 | Stop reason: HOSPADM

## 2017-03-10 RX ORDER — MISOPROSTOL 200 UG/1
800 TABLET ORAL
Status: COMPLETED | OUTPATIENT
Start: 2017-03-10 | End: 2017-03-11

## 2017-03-10 RX ORDER — BUPIVACAINE HYDROCHLORIDE 2.5 MG/ML
INJECTION, SOLUTION EPIDURAL; INFILTRATION; INTRACAUDAL
Status: ACTIVE
Start: 2017-03-10 | End: 2017-03-11

## 2017-03-10 RX ORDER — HYDROCODONE BITARTRATE AND ACETAMINOPHEN 5; 325 MG/1; MG/1
2 TABLET ORAL EVERY 4 HOURS PRN
Status: DISCONTINUED | OUTPATIENT
Start: 2017-03-10 | End: 2017-03-12 | Stop reason: HOSPADM

## 2017-03-10 RX ORDER — OXYCODONE HYDROCHLORIDE AND ACETAMINOPHEN 5; 325 MG/1; MG/1
1 TABLET ORAL EVERY 4 HOURS PRN
Status: DISCONTINUED | OUTPATIENT
Start: 2017-03-10 | End: 2017-03-12 | Stop reason: HOSPADM

## 2017-03-10 RX ORDER — SODIUM CHLORIDE, SODIUM LACTATE, POTASSIUM CHLORIDE, CALCIUM CHLORIDE 600; 310; 30; 20 MG/100ML; MG/100ML; MG/100ML; MG/100ML
INJECTION, SOLUTION INTRAVENOUS CONTINUOUS
Status: DISCONTINUED | OUTPATIENT
Start: 2017-03-10 | End: 2017-03-12 | Stop reason: HOSPADM

## 2017-03-10 RX ADMIN — SODIUM CHLORIDE, POTASSIUM CHLORIDE, SODIUM LACTATE AND CALCIUM CHLORIDE: 600; 310; 30; 20 INJECTION, SOLUTION INTRAVENOUS at 22:23

## 2017-03-10 RX ADMIN — ROPIVACAINE HYDROCHLORIDE 200 MG: 2 INJECTION, SOLUTION EPIDURAL; INFILTRATION at 21:45

## 2017-03-10 RX ADMIN — FENTANYL CITRATE 100 MCG: 50 INJECTION, SOLUTION INTRAMUSCULAR; INTRAVENOUS at 20:40

## 2017-03-10 RX ADMIN — SODIUM CHLORIDE, POTASSIUM CHLORIDE, SODIUM LACTATE AND CALCIUM CHLORIDE 1000 ML: 600; 310; 30; 20 INJECTION, SOLUTION INTRAVENOUS at 21:31

## 2017-03-10 RX ADMIN — Medication 2000 ML/HR: at 23:54

## 2017-03-10 ASSESSMENT — LIFESTYLE VARIABLES: ALCOHOL_USE: NO

## 2017-03-10 NOTE — MR AVS SNAPSHOT
Lisbeth Hernández   3/10/2017 3:45 PM   Routine Prenatal   MRN: [id number]    [facility]   Dept Phone:  [phone]    Description:  Female : 1995   Provider:  Kaelyn Vigil C.N.M.           Allergies as of 3/10/2017     Allergen Noted Reactions    Sulfa Drugs 2013       Family history of allergy, pt has not taken      You were diagnosed with     Encounter for supervision of normal first pregnancy in second trimester   [[id number]]  -  Primary       Vital Signs     Blood Pressure Weight Last Menstrual Period Smoking Status          122/84 mmHg 100.245 kg (221 lb) 2016 (Approximate) Former Smoker        Basic Information     Date Of Birth Sex Race Ethnicity Preferred Language    1995 Female  or   Origin (Turkmen,Nauruan,Bulgarian,Jose Manuel, etc) English      Your appointments     Mar 21, 2017  8:00 AM   [facility] INDUCTION OF LABOR with NON-SURGICAL L&D   LABOR & DELIVERY [facility] (--)    [address] [place] [address] [phone]              Problem List              ICD-10-CM Priority Class Noted - Resolved    Supervision of normal pregnancy in second trimester Z34.92   10/18/2016 - Present    Low grade squamous intraepithelial lesion on cytologic smear of cervix (lgsil) - for repeat pap/colpo PRN 6 weeks PP R87.612   10/31/2016 - Present      Health Maintenance        Date Due Completion Dates    IMM HEP B VACCINE (1 of 3 - Primary Series) 1995 ---    IMM HEP A VACCINE (1 of 2 - Standard Series) 1996 ---    IMM HPV VACCINE (1 of 3 - Female 3 Dose Series) 2006 ---    IMM VARICELLA (CHICKENPOX) VACCINE (1 of 2 - 2 Dose Adolescent Series) 2008 ---    IMM MENINGOCOCCAL VACCINE (MCV4) (1 of 1) 2011 ---    IMM INFLUENZA (1) 2016 ---    PAP SMEAR 10/18/2019 10/18/2016    IMM DTaP/Tdap/Td Vaccine (2 - Td) 2026            Current Immunizations     Tdap Vaccine 2016  4:18 PM      Below and/or  attached are the medications your provider expects you to take. Review all of your home medications and newly ordered medications with your provider and/or pharmacist. Follow medication instructions as directed by your provider and/or pharmacist. Please keep your medication list with you and share with your provider. Update the information when medications are discontinued, doses are changed, or new medications (including over-the-counter products) are added; and carry medication information at all times in the event of emergency situations     Allergies:  SULFA DRUGS - (reactions not documented)               Medications  Valid as of: March 10, 2017 -  4:10 PM    Generic Name Brand Name Tablet Size Instructions for use    Prenatal Vit-Fe Fumarate-FA   Take  by mouth.        .                 Medicines prescribed today were sent to:     [facility]/PHARMACY #[id number] - [place], [place] - [address] [address]    Phone: [phone] Fax: [phone]    Open 24 Hours?: No      Medication refill instructions:       If your prescription bottle indicates you have medication refills left, it is not necessary to call your provider’s office. Please contact your pharmacy and they will refill your medication.    If your prescription bottle indicates you do not have any refills left, you may request refills at any time through one of the following ways: The online [employer] system (except Urgent Care), by calling your provider’s office, or by asking your pharmacy to contact your provider’s office with a refill request. Medication refills are processed only during regular business hours and may not be available until the next business day. Your provider may request additional information or to have a follow-up visit with you prior to refilling your medication.   *Please Note: Medication refills are assigned a new Rx number when refilled electronically. Your pharmacy may indicate that  no refills were authorized even though a new prescription for the same medication is available at the pharmacy. Please request the medicine by name with the pharmacy before contacting your provider for a refill.        Instructions    P:  1.  Continue FKCs.         2.  Labor precautions given.  Instructions given on where to go.  Pt receptive to education.         3.  D/w pt IOL policy.  IOL info given to pt.        4.  Questions answered.         5.  Encouraged adequate water intake       6.  F/u 1wk       Other Notes About Your Plan     TODD Larios           Return[employer]uler Access Code: [email or web address]  Expires: 4/4/2017  8:40 AM    ReturnHauler  A secure, online tool to manage your health information     [employer]’s ReturnHauler® is a secure, online tool that connects you to your personalized health information from the privacy of your home -- day or night - making it very easy for you to manage your healthcare. Once the activation process is completed, you can even access your medical information using the ReturnHauler rogelio, which is available for free in the Apple Rogelio store or Google Play store.     ReturnHauler provides the following levels of access (as shown below):   My Chart Features   [facility] Doctor [facility]  Specialists [facility]  Urgent  Care Non-[facility]  Primary Care  Doctor   Email your healthcare team securely and privately 24/7 X X X    Manage appointments: schedule your next appointment; view details of past/upcoming appointments X      Request prescription refills. X      View recent personal medical records, including lab and immunizations X X X X   View health record, including health history, allergies, medications X X X X   Read reports about your outpatient visits, procedures, consult and ER notes X X X X   See your discharge summary, which is a recap of your hospital and/or ER visit that includes your diagnosis, lab results, and care plan. X X       How to register for Return[employer]uler:  1. Go to   [email or web address].  2. Click on the Sign Up Now box, which takes you to the New Member Sign Up page. You will need to provide the following information:  a. Enter your Digital Lab Access Code exactly as it appears at the top of this page. (You will not need to use this code after you’ve completed the sign-up process. If you do not sign up before the expiration date, you must request a new code.)   b. Enter your date of birth.   c. Enter your home email address.   d. Click Submit, and follow the next screen’s instructions.  3. Create a Digital Lab ID. This will be your Digital Lab login ID and cannot be changed, so think of one that is secure and easy to remember.  4. Create a D[employer] Oncologyt password. You can change your password at any time.  5. Enter your Password Reset Question and Answer. This can be used at a later time if you forget your password.   6. Enter your e-mail address. This allows you to receive e-mail notifications when new information is available in Digital Lab.  7. Click Sign Up. You can now view your health information.    For assistance activating your Digital Lab account, call [phone]

## 2017-03-10 NOTE — PROGRESS NOTES
Ob F/U  +FM  Pt c/o contractions, swelling in ankles and a cold. Pt states she felt dizzy and was feeling hot flashes at work yesterday.   Good phone [phone]  GBS-NEGATIVE  Pt informed about IOL on 03/21/17 at 8am.

## 2017-03-10 NOTE — IP AVS SNAPSHOT
Home Care Instructions                                                                                                                Lisbeth Hernández   MRN: [id number]    Department:  POST PARTUM 31   3/10/2017           Your appointments     Mar 14, 2017  4:30 PM   OB Follow Up with MORENA Escudero   [facility] ([facility])    [address] [facility] [address]  [place] [id number] [phone]              Follow-up Information     1. Follow up with [facility], M.D. In 5 weeks.    Specialty:  OB/Gyn    Contact information    [address] [place] [address]  [address]  [facility] [address]  [phone]         I assume responsibility for securing a follow-up  Screening blood test on my baby within the specified date range.    -                  Discharge Instructions       POSTPARTUM DISCHARGE INSTRUCTIONS FOR MOM    YOB: 1995   Age: 21 y.o.               Admit Date: 3/10/2017     Discharge Date: 3/12/2017  Attending Doctor:  Kendall Marcano M.D.                  Allergies:  Sulfa drugs    Discharged to home by car. Discharged via wheelchair, hospital escort: Yes.  Special equipment needed: Not Applicable  Belongings with: Personal  Be sure to schedule a follow-up appointment with your primary care doctor or any specialists as instructed.     Discharge Plan:   Diet Plan: Discussed  Activity Level: Discussed  Confirmed Follow up Appointment: Patient to Call and Schedule Appointment  Medication Reconciliation Updated: Yes  Influenza Vaccine Indication: Patient Refuses    REASONS TO CALL YOUR OBSTETRICIAN:  1.   Persistent fever or shaking chills (Temperature higher than 100.4)  2.   Heavy bleeding (soaking more than 1 pad per hour); Passing clots  3.   Foul odor from vagina  4.   Mastitis (Breast infection; breast pain, chills, fever, redness)  5.   Urinary pain, burning or frequency  6.   Episiotomy infection  7.   Abdominal incision infection  8.   Severe depression longer than 24 hours  Nothing in the vagina  "(ie Tampons, douching, intercourse,...) for until follow-up in the office in six weeks.   No soaking in bathtubs or hot tubs until follow-up appointment in 6 weeks.   Continue to take your prenatal vitamins while breastfeeding.   Return to ER or see your primary care physician if your symptoms worsen or for any new problems, questions or concerns  HAND WASHING  · Prior to handling the baby.  · Before breastfeeding or bottle feeding baby.  · After using the bathroom or changing the baby's diaper.    WOUND CARE  Ask your physician for additional care instructions.  In general:    ·  Incision:      · Keep clean and dry.    · Do NOT lift anything heavier than your baby for up to 6 weeks.    · There should not be any opening or pus.      VAGINAL CARE  · Nothing inside vagina for 6 weeks: no sexual intercourse, tampons or douching.  · Bleeding may continue for 2-4 weeks.  Amount may vary.    · Call your physician for heavy bleeding which means soaking more than 1 pad per hour    BIRTH CONTROL  · It is possible to become pregnant at any time after delivery and while breastfeeding.  · Plan to discuss a method of birth control with your physician at your follow up visit. visit.    DIET AND ELIMINATION  · Eating more fiber (bran cereal, fruits, and vegetables) and drinking plenty of fluids will help to avoid constipation.  · Urinary frequency after childbirth is normal.    POSTPARTUM BLUES  During the first few days after birth, you may experience a sense of the \"blues\" which may include impatience, irritability or even crying.  These feeling come and go quickly.  However, as many as 1 in 10 women experience emotional symptoms known as postpartum depression.    Postpartum depression:  May start as early as the second or third day after delivery or take several weeks or months to develop.  Symptoms of \"blues\" are present, but are more intense:  Crying spells; loss of appetite; feelings of hopelessness or loss of " "control; fear of touching the baby; over concern or no concern at all about the baby; little or no concern about your own appearance/caring for yourself; and/or inability to sleep or excessive sleeping.  Contact your physician if you are experiencing any of these symptoms.    Crisis Hotline:  · [place] Crisis Hotline:  7-889-SCHNUYR  Or 1-919.831.9218  · [place] Crisis Hotline:  1-974.941.5725  Or 566-133-0360    PREVENTING SHAKEN BABY:  If you are angry or stressed, PUT THE BABY IN THE CRIB, step away, take some deep breaths, and wait until you are calm to care for the baby.  DO NOT SHAKE THE BABY.  You are not alone, call a supporter for help.    · Crisis Call Center 24/7 crisis line 836-365-1535 or 1-993.148.3415  · You can also text them, text \"ANSWER\" to 022673    QUIT SMOKING/TOBACCO USE:  I understand the use of any tobacco products increases my chance of suffering from future heart disease and could cause other illnesses which may shorten my life. Quitting the use of tobacco products is the single most important thing I can do to improve my health. For further information on smoking / tobacco cessation call a Toll Free Quit Line at 1-767.443.1639 (*National [employer] [place]) or 1-245.775.7578 (American Lung Association) or you can access the web based program at www.lungusa.org.    · [place] Tobacco Users Help Line:  (799) 630-2146       Toll Free: 1-849.544.9649  · Quit Tobacco Program [facility] (285)779-7133    DEPRESSION / SUICIDE RISK:  As you are discharged from this [facility], it is important to learn how to keep safe from harming yourself.    Recognize the warning signs:  · Abrupt changes in personality, positive or negative- including increase in energy   · Giving away possessions  · Change in eating patterns- significant weight changes-  positive or negative  · Change in sleeping patterns- unable to sleep or sleeping all the time   · Unwillingness or inability to " communicate  · Depression  · Unusual sadness, discouragement and loneliness  · Talk of wanting to die  · Neglect of personal appearance   · Rebelliousness- reckless behavior  · Withdrawal from people/activities they love  · Confusion- inability to concentrate     If you or a loved one observes any of these behaviors or has concerns about self-harm, here's what you can do:  · Talk about it- your feelings and reasons for harming yourself  · Remove any means that you might use to hurt yourself (examples: pills, rope, extension cords, firearm)  · Get professional help from the community (Mental Health, Substance Abuse, psychological counseling)  · Do not be alone:Call your Safe Contact- someone whom you trust who will be there for you.  · Call your local CRISIS HOTLINE 428-2286 or 536-711-4088  · Call your local Children's Mobile Crisis Response Team [place] (825) 357-3515 or www.Anyvite  · Call the toll free National Suicide Prevention Hotlines   · National Suicide Prevention Lifeline 553-027-NHDS (4001)  · Right [employer]vance Hope Line Network 800-SUICIDE (933-0084)    DISCHARGE SURVEY:  Thank you for choosing [facility].  We hope we provided you with very good care.  You may be receiving a survey in the mail.  Please fill it out.  Your opinion is valuable to us.    ADDITIONAL EDUCATIONAL MATERIALS GIVEN TO PATIENT:        My signature on this form indicates that:  1.  I have reviewed and understand the above information  2.  My questions regarding this information have been answered to my satisfaction.  3.  I have formulated a plan with my discharge nurse to obtain my prescribed medication for home.         Discharge Medication Instructions:    Below are the medications your physician expects you to take upon discharge:    Review all your home medications and newly ordered medications with your doctor and/or pharmacist. Follow medication instructions as directed by your doctor and/or pharmacist.    Please keep  your medication list with you and share with your physician.               Medication List      START taking these medications        Instructions     MG Caps   Last time this was given:  100 mg on 3/12/2017 10:53 AM    Take 100 mg by mouth 2 times a day as needed for Constipation.   Dose:  100 mg       ferrous sulfate 325 (65 FE) MG EC tablet    Take 1 Tab by mouth 3 times a day, with meals.   Dose:  325 mg       ibuprofen 800 MG Tabs   Last time this was given:  800 mg on 3/11/2017  9:57 PM   Commonly known as:  MOTRIN    Take 1 Tab by mouth every 6 hours as needed.   Dose:  800 mg         CONTINUE taking these medications        Instructions    PRENATAL VITAMIN PO    Take  by mouth.               Crisis Hotline:     [place] Crisis Hotline:  7-501-LPZIGIC or 1-530.143.3306    [place] Crisis Hotline:    1-459.929.8141 or 075-790-9648        Disclaimer           _____________________________________                     __________       ________       Patient/Mother Signature or Legal                          Date                   Time

## 2017-03-11 LAB
ERYTHROCYTE [DISTWIDTH] IN BLOOD BY AUTOMATED COUNT: 44.6 FL (ref 35.9–50)
HCT VFR BLD AUTO: 29.9 % (ref 37–47)
HGB BLD-MCNC: 10 G/DL (ref 12–16)
MCH RBC QN AUTO: 30.5 PG (ref 27–33)
MCHC RBC AUTO-ENTMCNC: 34.1 G/DL (ref 33.6–35)
MCV RBC AUTO: 89.3 FL (ref 81.4–97.8)
PLATELET # BLD AUTO: 207 K/UL (ref 164–446)
PMV BLD AUTO: 11.1 FL (ref 9–12.9)
RBC # BLD AUTO: 3.28 M/UL (ref 4.2–5.4)
WBC # BLD AUTO: 15.9 K/UL (ref 4.8–10.8)

## 2017-03-11 PROCEDURE — 770002 HCHG ROOM/CARE - OB PRIVATE (112)

## 2017-03-11 PROCEDURE — 700112 HCHG RX REV CODE 229: Performed by: NURSE PRACTITIONER

## 2017-03-11 PROCEDURE — 700111 HCHG RX REV CODE 636 W/ 250 OVERRIDE (IP)

## 2017-03-11 PROCEDURE — 700111 HCHG RX REV CODE 636 W/ 250 OVERRIDE (IP): Performed by: NURSE PRACTITIONER

## 2017-03-11 PROCEDURE — A9270 NON-COVERED ITEM OR SERVICE: HCPCS | Performed by: NURSE PRACTITIONER

## 2017-03-11 PROCEDURE — 303615 HCHG EPIDURAL/SPINAL ANESTHESIA FOR LABOR

## 2017-03-11 PROCEDURE — 36415 COLL VENOUS BLD VENIPUNCTURE: CPT

## 2017-03-11 PROCEDURE — 304965 HCHG RECOVERY SERVICES

## 2017-03-11 PROCEDURE — 700102 HCHG RX REV CODE 250 W/ 637 OVERRIDE(OP): Performed by: NURSE PRACTITIONER

## 2017-03-11 PROCEDURE — 59409 OBSTETRICAL CARE: CPT

## 2017-03-11 PROCEDURE — 85027 COMPLETE CBC AUTOMATED: CPT

## 2017-03-11 RX ORDER — MISOPROSTOL 200 UG/1
600 TABLET ORAL
Status: DISCONTINUED | OUTPATIENT
Start: 2017-03-11 | End: 2017-03-12 | Stop reason: HOSPADM

## 2017-03-11 RX ORDER — DOCUSATE SODIUM 100 MG/1
100 CAPSULE, LIQUID FILLED ORAL 2 TIMES DAILY PRN
Status: DISCONTINUED | OUTPATIENT
Start: 2017-03-11 | End: 2017-03-12 | Stop reason: HOSPADM

## 2017-03-11 RX ORDER — IBUPROFEN 800 MG/1
800 TABLET ORAL EVERY 6 HOURS PRN
Status: DISCONTINUED | OUTPATIENT
Start: 2017-03-11 | End: 2017-03-12 | Stop reason: HOSPADM

## 2017-03-11 RX ORDER — VITAMIN A ACETATE, BETA CAROTENE, ASCORBIC ACID, CHOLECALCIFEROL, .ALPHA.-TOCOPHEROL ACETATE, DL-, THIAMINE MONONITRATE, RIBOFLAVIN, NIACINAMIDE, PYRIDOXINE HYDROCHLORIDE, FOLIC ACID, CYANOCOBALAMIN, CALCIUM CARBONATE, FERROUS FUMARATE, ZINC OXIDE, CUPRIC OXIDE 3080; 12; 120; 400; 1; 1.84; 3; 20; 22; 920; 25; 200; 27; 10; 2 [IU]/1; UG/1; MG/1; [IU]/1; MG/1; MG/1; MG/1; MG/1; MG/1; [IU]/1; MG/1; MG/1; MG/1; MG/1; MG/1
1 TABLET, FILM COATED ORAL EVERY MORNING
Status: DISCONTINUED | OUTPATIENT
Start: 2017-03-11 | End: 2017-03-12 | Stop reason: HOSPADM

## 2017-03-11 RX ORDER — SODIUM CHLORIDE, SODIUM LACTATE, POTASSIUM CHLORIDE, CALCIUM CHLORIDE 600; 310; 30; 20 MG/100ML; MG/100ML; MG/100ML; MG/100ML
INJECTION, SOLUTION INTRAVENOUS PRN
Status: DISCONTINUED | OUTPATIENT
Start: 2017-03-11 | End: 2017-03-12 | Stop reason: HOSPADM

## 2017-03-11 RX ORDER — METHYLERGONOVINE MALEATE 0.2 MG/ML
INJECTION INTRAVENOUS
Status: DISCONTINUED
Start: 2017-03-11 | End: 2017-03-11

## 2017-03-11 RX ORDER — OXYCODONE HYDROCHLORIDE AND ACETAMINOPHEN 5; 325 MG/1; MG/1
1 TABLET ORAL EVERY 4 HOURS PRN
Status: DISCONTINUED | OUTPATIENT
Start: 2017-03-11 | End: 2017-03-12 | Stop reason: HOSPADM

## 2017-03-11 RX ORDER — MISOPROSTOL 200 UG/1
TABLET ORAL
Status: ACTIVE
Start: 2017-03-11 | End: 2017-03-11

## 2017-03-11 RX ADMIN — OXYCODONE HYDROCHLORIDE AND ACETAMINOPHEN 1 TABLET: 5; 325 TABLET ORAL at 01:01

## 2017-03-11 RX ADMIN — FENTANYL CITRATE 100 MCG: 50 INJECTION, SOLUTION INTRAMUSCULAR; INTRAVENOUS at 00:03

## 2017-03-11 RX ADMIN — Medication 1 TABLET: at 09:00

## 2017-03-11 RX ADMIN — Medication 125 ML/HR: at 01:02

## 2017-03-11 RX ADMIN — DOCUSATE SODIUM 100 MG: 100 CAPSULE ORAL at 21:57

## 2017-03-11 RX ADMIN — IBUPROFEN 800 MG: 800 TABLET, FILM COATED ORAL at 21:57

## 2017-03-11 RX ADMIN — FENTANYL CITRATE 100 MCG: 50 INJECTION, SOLUTION INTRAMUSCULAR; INTRAVENOUS at 00:30

## 2017-03-11 RX ADMIN — METHYLERGONOVINE MALEATE 0.2 MG: 0.2 INJECTION, SOLUTION INTRAMUSCULAR; INTRAVENOUS at 00:21

## 2017-03-11 RX ADMIN — IBUPROFEN 600 MG: 600 TABLET, FILM COATED ORAL at 01:01

## 2017-03-11 RX ADMIN — MISOPROSTOL 800 MCG: 200 TABLET ORAL at 00:40

## 2017-03-11 RX ADMIN — HYDROCODONE BITARTRATE AND ACETAMINOPHEN 2 TABLET: 5; 325 TABLET ORAL at 21:57

## 2017-03-11 ASSESSMENT — PAIN SCALES - GENERAL
PAINLEVEL_OUTOF10: 6
PAINLEVEL_OUTOF10: 1
PAINLEVEL_OUTOF10: 2

## 2017-03-11 NOTE — H&P
"History and Physical      Lisbeth Hernández is a 21 y.o. female  at 39w2d who presents for active labor with SROM at aprox 2000.     Subjective:   positive  For CTXS.   positive Feels pain   positive for LOF  negative for vaginal bleeding.   positive for fetal movement    ROS: Pertinent items are noted in HPI.    No past medical history on file.  Past Surgical History   Procedure Laterality Date   • Pr repair of nasal septum     • Tumor excision with biopsy  ;      benign tumors under tongue X3 surgeries     OB History    Para Term  AB SAB TAB Ectopic Multiple Living   1               # Outcome Date GA Lbr Bradley/2nd Weight Sex Delivery Anes PTL Lv   1 Current                 Social History     Social History   • Marital Status: Single     Spouse Name: N/A   • Number of Children: N/A   • Years of Education: N/A     Occupational History   • Not on file.     Social History Main Topics   • Smoking status: Former Smoker     Quit date: 2016   • Smokeless tobacco: Never Used   • Alcohol Use: No      Comment: not during pregnancy   • Drug Use: No   • Sexual Activity:     Partners: Male     Birth Control/ Protection: Condom      Comment:  Unplanned     Other Topics Concern   • Not on file     Social History Narrative     Allergies: Sulfa drugs  No current facility-administered medications for this encounter.    Prenatal care with [facility] starting at 21 week gestation with following problems:  Patient Active Problem List    Diagnosis Date Noted   • Low grade squamous intraepithelial lesion on cytologic smear of cervix (lgsil) - for repeat pap/colpo PRN 6 weeks PP 10/31/2016   • Supervision of normal pregnancy in second trimester 10/18/2016               Objective:      Height 1.702 m (5' 7\"), weight 99.791 kg (220 lb), last menstrual period 2016.    General:   no acute distress   Skin:   normal   HEENT:  PERRLA   Lungs:   CTA bilateral   Heart:   S1, S2 normal, no murmur, click, rub or " gallop, regular rate and rhythm, S1, S2 normal, brisk carotid upstroke without bruits, peripheral pulses very brisk, chest is clear without rales or wheezing, no pedal edema, no JVD, no hepatosplenomegaly   Abdomen:   gravid, NT   EFW:  3400   Pelvis:  adequate with gynecoid pelvis   FHT:  140 BPM   Uterine Size: S=D   Presentations: Cephalic   Cervix:     Dilation: 6 cm    Effacement: 100%    Station:  0    Consistency: Soft    Position: Anterior     Lab Review  Lab:   Blood type: A     Recent Results (from the past 5880 hour(s))   POCT Urinalysis    Collection Time: 10/18/16  3:30 PM   Result Value Ref Range    POC Color  Negative    POC Appearance  Negative    POC Leukocyte Esterase Small Negative    POC Nitrites Negative Negative    POC Urobiligen  Negative (0.2) mg/dL    POC Protein Negative Negative mg/dL    POC Urine PH 7.0 5.0 - 8.0    POC Blood Negative Negative    POC Specific Gravity 1.005 <1.005 - >1.030    POC Ketones Negative Negative mg/dL    POC Biliruben  Negative mg/dL    POC Glucose Negative Negative mg/dL   POCT Pregnancy    Collection Time: 10/18/16  3:30 PM   Result Value Ref Range    POC Urine Pregnancy Test positive Negative    Internal Control Positive Positive     Internal Control Negative Negative    THINPREP RFLX HPV ASCUS W/CTNG    Collection Time: 10/18/16  4:43 PM   Result Value Ref Range    Cytology Reg See Path Report     Source Endo/Cervical     C. trachomatis by PCR Negative Negative    N. gonorrhoeae by PCR Negative Negative   PREG CNTR PRENATAL PN    Collection Time: 10/19/16  4:12 PM   Result Value Ref Range    Color Colorless     Character Clear     Specific Gravity 1.008 <1.035    Ph 6.5 5.0-8.0    Glucose Negative Negative mg/dL    Ketones Negative Negative mg/dL    Protein Negative Negative mg/dL    Bilirubin Negative Negative    Nitrite Negative Negative    Leukocyte Esterase Negative Negative    Occult Blood Negative Negative    Micro Urine Req see below     Culture  Indicated No UA Culture    WBC 8.5 4.8 - 10.8 K/uL    RBC 4.56 4.20 - 5.40 M/uL    Hemoglobin 13.8 12.0 - 16.0 g/dL    Hematocrit 40.9 37.0 - 47.0 %    MCV 89.7 81.4 - 97.8 fL    MCH 30.3 27.0 - 33.0 pg    MCHC 33.7 33.6 - 35.0 g/dL    RDW 42.7 35.9 - 50.0 fL    Platelet Count 274 164 - 446 K/uL    MPV 11.3 9.0 - 12.9 fL    Neutrophils-Polys 64.30 44.00 - 72.00 %    Lymphocytes 25.10 22.00 - 41.00 %    Monocytes 7.80 0.00 - 13.40 %    Eosinophils 1.10 0.00 - 6.90 %    Basophils 0.60 0.00 - 1.80 %    Immature Granulocytes 1.10 (H) 0.00 - 0.90 %    Nucleated RBC 0.00 /100 WBC    Neutrophils (Absolute) 5.46 2.00 - 7.15 K/uL    Lymphs (Absolute) 2.13 1.00 - 4.80 K/uL    Monos (Absolute) 0.66 0.00 - 0.85 K/uL    Eos (Absolute) 0.09 0.00 - 0.51 K/uL    Baso (Absolute) 0.05 0.00 - 0.12 K/uL    Immature Granulocytes (abs) 0.09 0.00 - 0.11 K/uL    NRBC (Absolute) 0.00 K/uL    Rubella IgG Antibody 157.90 IU/mL    Syphilis, Treponemal Qual Non Reactive Non Reactive    Hepatitis B Surface Antigen Negative Negative   AFP TETRA    Collection Time: 10/19/16  4:12 PM   Result Value Ref Range    AFP Value -Eia 35 ng/mL    AFP MOM Value 0.63     Hcg Value 22084 IU/L    Hcg Mom 1.49     Ue3 Value 1.45 ng/mL    Ue3 Mom 0.63     Interpretation Normal     Maternal Age at EM[facility] 21.7 yr    Gestational Age Based On LNMP     Gestational Age 21.14 weeks    Insulin Dependent Diabetes No     Race Other     Multiple Pregnancy One     [place] Value -Eia 104 pg/mL    [place] Mom Value 0.58     Maternal Weight 198 lbs    Err Maternal Scrn AFP See Note    HIV ANTIBODIES    Collection Time: 10/19/16  4:12 PM   Result Value Ref Range    HIV Ag/Ab Combo Assay Non Reactive Non Reactive   OP PRENATAL PANEL-BLOOD BANK    Collection Time: 10/19/16  4:12 PM   Result Value Ref Range    ABO Grouping Only A     Rh Grouping Only POS     Antibody Screen Scrn NEG    GLUCOSE 1HR GESTATIONAL    Collection Time: 12/05/16  4:52 PM   Result Value Ref Range    Glucose, Post Dose  138 70 - 139 mg/dL   HCT    Collection Time: 12/05/16  4:52 PM   Result Value Ref Range    Hematocrit 38.4 37.0 - 47.0 %   HGB    Collection Time: 12/05/16  4:52 PM   Result Value Ref Range    Hemoglobin 12.9 12.0 - 16.0 g/dL   T.PALLIDUM AB EIA    Collection Time: 12/05/16  4:52 PM   Result Value Ref Range    Syphilis, Treponemal Qual Non Reactive Non Reactive   POC UA    Collection Time: 01/01/17 11:11 AM   Result Value Ref Range    POC Color Yellow     POC Appearance Slightly Cloudy (A)     POC Glucose Negative Negative mg/dL    POC Ketones Negative Negative mg/dL    POC Specific Gravity 1.020 1.005-1.030    POC Blood Negative Negative    POC Urine PH 6.5 5.0-8.0    POC Protein Negative Negative mg/dL    POC Nitrites Negative Negative    POC Leukocyte Esterase Negative Negative   POCT Urinalysis    Collection Time: 02/07/17  4:20 PM   Result Value Ref Range    POC Color  Negative    POC Appearance  Negative    POC Leukocyte Esterase MODERATE Negative    POC Nitrites NEGATIVE Negative    POC Urobiligen  Negative (0.2) mg/dL    POC Protein TRACE Negative mg/dL    POC Urine PH 6.5 5.0 - 8.0    POC Blood NEGATIVE Negative    POC Specific Gravity 1.020 <1.005 - >1.030    POC Ketones NEGATIVE Negative mg/dL    POC Biliruben  Negative mg/dL    POC Glucose NEGATIVE Negative mg/dL   GRP B STREP, BY PCR (GOMEZ BROTH)    Collection Time: 02/17/17  4:49 PM   Result Value Ref Range    Strep Gp B DNA PCR Negative Negative      Show images for US-OB 2ND 3RD TRI COMPLETE       Associated Diagnoses      Encounter for supervision of other normal pregnancy in first trimester           Imaging Result Status      Status           Final result (11/2/2016  1:30 PM)         Imaging Previous Results      Open Hard Copy Result Report (Order #[id number] - US-OB 2ND 3RD TRI COMPLETE)       Narrative        11/2/2016 12:21 PM    HISTORY/REASON FOR EXAM:  Evaluate fetal anatomy, alpha-fetoprotein within normal limits    TECHNIQUE/EXAM  DESCRIPTION: OB complete ultrasound.    COMPARISON:  None    FINDINGS:  Fetal Lie:  Vertex  LMP:  5/24/2016  Clinical [facility] by LMP:  2/28/2017    Placenta (Location):  Anterior  Placenta Previa: No  Placental Grade: I    Amniotic Fluid Volume:  TALHA = 14.92 cm    Fetal Heart Rate:  154 bpm    Cervical Length:  3.15 cm transabdominal    No maternal adnexal mass is identified.    Umbilical Artery S/D Ratio(s):  Not applicable    Fetal Anatomy  (Seen or Not Seen)  Lateral Ventricles     Seen  Cisterna Magna        Seen  Cerebellum              Seen  CSP             Seen  Orbits             Seen  Face/Lips                Seen  Cord Insertion         Seen  Placental CI         Seen  4 Chamber Heart     Seen  LVOT               Seen  RVOT              Seen  Stomach       Seen  Kidneys                   Seen  Urinary Bladder      Seen  Spine                       Seen  3 Vessel Cord          Seen  Both Upper Extremities    Seen  Both Lower Extremities    Seen  Diaphragm             Seen  Movement       Seen  Gender:    Fetal Biometry  BPD    5.01 cm, 21 weeks, 1 day  HC    18.20 cm, 20 weeks, 4 days  AC    17.35 cm, 22 weeks, 2 days  Femur Length    3.32 cm, 20 weeks, 3 days  Humerus Length    3.22 cm, 20 weeks, 6 days  Cerebellum Diameter   2.17 cm    EGA by this US:  21 weeks  EMORY by this US: 3/15/2017  [facility]ORY by 1st US:  Not applicable    Estimated Fetal Weight:  417 grams    Comments:         Impression        Single intrauterine pregnancy of an estimated gestational age of 21 weeks with an estimated date of delivery of 3/15/2017. Size is less than dates by approximately 2 weeks.            Assessment:   Lisbeth Hernández at 39w2d  Labor status: SROM and Active phase labor.  Obstetrical history significant for   Patient Active Problem List    Diagnosis Date Noted   • Low grade squamous intraepithelial lesion on cytologic smear of cervix (lgsil) - for repeat pap/colpo PRN 6 weeks PP 10/31/2016   • Supervision of normal  pregnancy in second trimester 10/18/2016   .      Plan:     Admit to L&D, GBS negative. RT for delivery due to light meconium. Anticipate .

## 2017-03-11 NOTE — DELIVERY NOTE
Delivery Note - [facility] 3/10/17    Patient is a 21 yoa G 1 P 0 at 39 2/7 week gestation. Admit for active labor. GBS negative. Adequate epidural anesthesia for the delivery but not the repair. Pushed effectively and delivered a viable male infant in a Direct OA position at 2350. Cord immediately clamped and cut and baby handed of to RT staff in attendance for meconium stained fluid. Placenta S&I 3vc in a peacock presentation at 2354. Fundus massaged to firm, pitocin Rapid IV. Perineum 2nd degree midline vaginal and left sidewall laceration repaired with 3-0 vicryl and 3-0 chromic. Hemostasis initially difficult as there were multiple areas of lacerated, oozing tissue.  Poor pain mgt during repair making visualization challenging. Lidocaine used along with IV fentanyl.  cc with Methergine IM and cytotec [place]. Apgars 6&7. Baby to NICU for monitoring as he was not maintaining oxygen saturations well. Patient was straight cathed after repair to drain bladder to improve uterine tone and ensure urethral patency.     Dr. Marcano attending.

## 2017-03-11 NOTE — PROGRESS NOTES
Late delivery close to Midnite .   PAtient stable , baby in ICN   P.   Holding d/c for this and unknown GBS

## 2017-03-11 NOTE — CARE PLAN
Problem: Safety  Goal: Will remain free from injury  Outcome: PROGRESSING AS EXPECTED  Patient educated on call light and bed controls.  Pt verbalized understanding and denies any questions at this time.

## 2017-03-11 NOTE — PROGRESS NOTES
Catherine INGRAM brought pt from labor and delivery.Patient oriented to room and surroundings. [place] discussed.Assessment completed on patient. WDL. Discussed pain management. PT DENIES ANY NEED FOR PAIN MEDICATION. IV without redness and swelling. Family at bedside. Encourage pt to call for any needs.

## 2017-03-11 NOTE — CARE PLAN
Problem: Altered physiologic condition related to immediate post-delivery state and potential for bleeding/hemorrhage  Goal: Patient physiologically stable as evidenced by normal lochia, palpable uterine involution and vital signs within normal limits  Intervention: Massage fundus as necessary to prevent excessive lochia  Fundus firm, lochia light      Problem: Potential for postpartum infection related to presence of episiotomy/vaginal tear and/or uterine contamination  Goal: Patient will be absent from signs and symptoms of infection  Outcome: PROGRESSING AS EXPECTED  Pt remains afebrile and free from signs of infection

## 2017-03-11 NOTE — CARE PLAN
Problem: Potential for postpartum infection related to presence of episiotomy/vaginal tear and/or uterine contamination  Goal: Patient will be absent from signs and symptoms of infection  Patient is afebrile. No signs and symptoms of infection noted.     Problem: Alteration in comfort related to episiotomy, vaginal repair and/or after birth pains  Goal: Patient is able to ambulate, care for self and infant  Outcome: PROGRESSING AS EXPECTED  Pt pt able to ambulate, care for self. Pt's pain controlled with prn pain medication.

## 2017-03-11 NOTE — PATIENT INSTRUCTIONS
P:  1.  Continue FKCs.         2.  Labor precautions given.  Instructions given on where to go.  Pt receptive to education.         3.  D/w pt IOL policy.  IOL info given to pt.        4.  Questions answered.         5.  Encouraged adequate water intake       6.  F/u 1wk

## 2017-03-11 NOTE — CARE PLAN
Problem: Knowledge Deficit  Goal: Patient/Support person demonstrates understanding regarding the progression of labor, available options and participates in decision-making process  Outcome: PROGRESSING AS EXPECTED  Pt educated on basics of fetal heart tracing.  Pt verbalized understanding and denies any questions at this time.

## 2017-03-11 NOTE — PROGRESS NOTES
"2045- Received report from SAROJ Almanza RN. Patient requesting epidural. Fluid bolus started.     2100- Dr. Wick to bedside. Epidural placed without complications.     2115- Unable to find heart tones upon laying patient down. FSE placed. SVE as noted. Scherer placed.     2220- SVE as noted. Patient becoming more uncomfortable. States there is intense pressure \"down low\". Educated patient that the epidural won't cover the pressure sensation but explained bolus button to patient. Bolus button pressed on epidural.     2250- SVE as noted. Began pushing. Pitlock DNP updated.    2315- Pitlock to bedside. FHT reviewed.     2330- Pitlock to bedside for delivery.     2350- Delivery of viable baby boy apgars 6,7. RT present for meconium. Extensive 2nd degree lac repair. Methergine and cytotec given. Fentanyl given for patient's comfort. See MAR.     0015- Baby transferred to NICU for further observation. Patient educated.        "

## 2017-03-11 NOTE — PROGRESS NOTES
S:  Pt is  at 39w2d here for routine OB follow up.  No c/o.  Reports good FM.  Denies VB, LOF, RUCs, or vaginal DC.     O:  Please see above vitals.        FHTs: 151        Fundal ht: 40        Fetal position: vertex        SVE: 5/80/0        GBS neg on 17 -- reviewed w pt.      A:  IUP at 39w2d  Patient Active Problem List    Diagnosis Date Noted   • Low grade squamous intraepithelial lesion on cytologic smear of cervix (lgsil) - for repeat pap/colpo PRN 6 weeks PP 10/31/2016   • Supervision of normal pregnancy in second trimester 10/18/2016       P:  1.  Continue FKCs.         2.  Labor precautions given.  Instructions given on where to go.  Pt receptive to education.         3.  D/w pt IOL policy.  IOL info given to pt.        4.  Questions answered.         5.  Encouraged adequate water intake       6.  F/u 1wk

## 2017-03-12 VITALS
HEIGHT: 67 IN | DIASTOLIC BLOOD PRESSURE: 65 MMHG | HEART RATE: 108 BPM | OXYGEN SATURATION: 98 % | SYSTOLIC BLOOD PRESSURE: 115 MMHG | BODY MASS INDEX: 34.53 KG/M2 | TEMPERATURE: 98.7 F | RESPIRATION RATE: 16 BRPM | WEIGHT: 220 LBS

## 2017-03-12 PROCEDURE — 700112 HCHG RX REV CODE 229: Performed by: NURSE PRACTITIONER

## 2017-03-12 PROCEDURE — 700111 HCHG RX REV CODE 636 W/ 250 OVERRIDE (IP): Performed by: NURSE PRACTITIONER

## 2017-03-12 PROCEDURE — A9270 NON-COVERED ITEM OR SERVICE: HCPCS | Performed by: NURSE PRACTITIONER

## 2017-03-12 PROCEDURE — 700102 HCHG RX REV CODE 250 W/ 637 OVERRIDE(OP): Performed by: NURSE PRACTITIONER

## 2017-03-12 RX ORDER — PSEUDOEPHEDRINE HCL 30 MG
100 TABLET ORAL 2 TIMES DAILY PRN
Qty: 60 CAP | Refills: 3 | Status: SHIPPED | OUTPATIENT
Start: 2017-03-12 | End: 2019-06-05

## 2017-03-12 RX ORDER — LANOLIN ALCOHOL/MO/W.PET/CERES
325 CREAM (GRAM) TOPICAL
Qty: 90 TAB | Refills: 3 | Status: SHIPPED | OUTPATIENT
Start: 2017-03-12 | End: 2019-06-05

## 2017-03-12 RX ORDER — IBUPROFEN 800 MG/1
800 TABLET ORAL EVERY 6 HOURS PRN
Qty: 30 TAB | Refills: 3 | Status: SHIPPED | OUTPATIENT
Start: 2017-03-12 | End: 2019-06-05

## 2017-03-12 RX ADMIN — DOCUSATE SODIUM 100 MG: 100 CAPSULE ORAL at 10:53

## 2017-03-12 RX ADMIN — Medication 1 TABLET: at 10:53

## 2017-03-12 RX ADMIN — ONDANSETRON 4 MG: 4 TABLET, ORALLY DISINTEGRATING ORAL at 14:10

## 2017-03-12 RX ADMIN — HYDROCODONE BITARTRATE AND ACETAMINOPHEN 2 TABLET: 5; 325 TABLET ORAL at 10:53

## 2017-03-12 ASSESSMENT — LIFESTYLE VARIABLES
EVER_SMOKED: YES
DO YOU DRINK ALCOHOL: NO

## 2017-03-12 ASSESSMENT — PAIN SCALES - GENERAL
PAINLEVEL_OUTOF10: 1
PAINLEVEL_OUTOF10: 7
PAINLEVEL_OUTOF10: 4
PAINLEVEL_OUTOF10: 1

## 2017-03-12 NOTE — CONSULTS
Reviewed pump settings and routine, encouraged breast massage and hand expression. Renting HG pump from [employer] desk today, follow-up with Yenni MAHER early next week.

## 2017-03-12 NOTE — PROGRESS NOTES
Patient states she felt nauseated and slightly dizzy at 1405. Vital signs taken. Patient given Zofran PO at 1410. Patient states nausea and dizziness is now resolved.

## 2017-03-12 NOTE — DISCHARGE INSTRUCTIONS
POSTPARTUM DISCHARGE INSTRUCTIONS FOR MOM    YOB: 1995   Age: 21 y.o.               Admit Date: 3/10/2017     Discharge Date: 3/12/2017  Attending Doctor:  Kendall Marcano M.D.                  Allergies:  Sulfa drugs    Discharged to home by car. Discharged via wheelchair, hospital escort: Yes.  Special equipment needed: Not Applicable  Belongings with: Personal  Be sure to schedule a follow-up appointment with your primary care doctor or any specialists as instructed.     Discharge Plan:   Diet Plan: Discussed  Activity Level: Discussed  Confirmed Follow up Appointment: Patient to Call and Schedule Appointment  Medication Reconciliation Updated: Yes  Influenza Vaccine Indication: Patient Refuses    REASONS TO CALL YOUR OBSTETRICIAN:  1.   Persistent fever or shaking chills (Temperature higher than 100.4)  2.   Heavy bleeding (soaking more than 1 pad per hour); Passing clots  3.   Foul odor from vagina  4.   Mastitis (Breast infection; breast pain, chills, fever, redness)  5.   Urinary pain, burning or frequency  6.   Episiotomy infection  7.   Abdominal incision infection  8.   Severe depression longer than 24 hours  Nothing in the vagina (ie Tampons, douching, intercourse,...) for until follow-up in the office in six weeks.   No soaking in bathtubs or hot tubs until follow-up appointment in 6 weeks.   Continue to take your prenatal vitamins while breastfeeding.   Return to ER or see your primary care physician if your symptoms worsen or for any new problems, questions or concerns  HAND WASHING  · Prior to handling the baby.  · Before breastfeeding or bottle feeding baby.  · After using the bathroom or changing the baby's diaper.    WOUND CARE  Ask your physician for additional care instructions.  In general:    ·  Incision:      · Keep clean and dry.    · Do NOT lift anything heavier than your baby for up to 6 weeks.    · There should not be any opening or pus.      VAGINAL CARE  · Nothing  "inside vagina for 6 weeks: no sexual intercourse, tampons or douching.  · Bleeding may continue for 2-4 weeks.  Amount may vary.    · Call your physician for heavy bleeding which means soaking more than 1 pad per hour    BIRTH CONTROL  · It is possible to become pregnant at any time after delivery and while breastfeeding.  · Plan to discuss a method of birth control with your physician at your follow up visit. visit.    DIET AND ELIMINATION  · Eating more fiber (bran cereal, fruits, and vegetables) and drinking plenty of fluids will help to avoid constipation.  · Urinary frequency after childbirth is normal.    POSTPARTUM BLUES  During the first few days after birth, you may experience a sense of the \"blues\" which may include impatience, irritability or even crying.  These feeling come and go quickly.  However, as many as 1 in 10 women experience emotional symptoms known as postpartum depression.    Postpartum depression:  May start as early as the second or third day after delivery or take several weeks or months to develop.  Symptoms of \"blues\" are present, but are more intense:  Crying spells; loss of appetite; feelings of hopelessness or loss of control; fear of touching the baby; over concern or no concern at all about the baby; little or no concern about your own appearance/caring for yourself; and/or inability to sleep or excessive sleeping.  Contact your physician if you are experiencing any of these symptoms.    Crisis Hotline:  · [place] Crisis Hotline:  0-227-NMBJTQS  Or 1-392.379.8664  · [place] Crisis Hotline:  1-545.336.5809  Or 046-726-7126    PREVENTING SHAKEN BABY:  If you are angry or stressed, PUT THE BABY IN THE CRIB, step away, take some deep breaths, and wait until you are calm to care for the baby.  DO NOT SHAKE THE BABY.  You are not alone, call a supporter for help.    · Crisis Call Center 24/7 crisis line 828-207-3699 or 1-166.795.4090  · You can also text them, text \"ANSWER\" to " 756412    QUIT SMOKING/TOBACCO USE:  I understand the use of any tobacco products increases my chance of suffering from future heart disease and could cause other illnesses which may shorten my life. Quitting the use of tobacco products is the single most important thing I can do to improve my health. For further information on smoking / tobacco cessation call a Toll Free Quit Line at 1-468.330.4044 (*[employer] [place]) or 1-915.761.3423 (American Lung Association) or you can access the web based program at www.lungusa.org.    · [place] Tobacco Users Help Line:  (189) 718-2420       Toll Free: 1-910.757.6239  · Quit Tobacco Program [facility] (888)949-5435    DEPRESSION / SUICIDE RISK:  As you are discharged from this [facility], it is important to learn how to keep safe from harming yourself.    Recognize the warning signs:  · Abrupt changes in personality, positive or negative- including increase in energy   · Giving away possessions  · Change in eating patterns- significant weight changes-  positive or negative  · Change in sleeping patterns- unable to sleep or sleeping all the time   · Unwillingness or inability to communicate  · Depression  · Unusual sadness, discouragement and loneliness  · Talk of wanting to die  · Neglect of personal appearance   · Rebelliousness- reckless behavior  · Withdrawal from people/activities they love  · Confusion- inability to concentrate     If you or a loved one observes any of these behaviors or has concerns about self-harm, here's what you can do:  · Talk about it- your feelings and reasons for harming yourself  · Remove any means that you might use to hurt yourself (examples: pills, rope, extension cords, firearm)  · Get professional help from the community (Mental Health, Substance Abuse, psychological counseling)  · Do not be alone:Call your Safe Contact- someone whom you trust who will be there for you.  · Call your local CRISIS  HOTLINE 288-2051 or 821-997-9157  · Call your local Children's Mobile Crisis Response Team [place] (927) 342-2567 or www.[employer]  · Call the toll free National Suicide Prevention Hotlines   · National Suicide Prevention Lifeline 563-864-QZUE (0011)  · National Hope Line Network 800-SUICIDE (841-2190)    DISCHARGE SURVEY:  Thank you for choosing [facility].  We hope we provided you with very good care.  You may be receiving a survey in the mail.  Please fill it out.  Your opinion is valuable to us.    ADDITIONAL EDUCATIONAL MATERIALS GIVEN TO PATIENT:        My signature on this form indicates that:  1.  I have reviewed and understand the above information  2.  My questions regarding this information have been answered to my satisfaction.  3.  I have formulated a plan with my discharge nurse to obtain my prescribed medication for home.

## 2017-03-12 NOTE — CARE PLAN
Problem: Altered physiologic condition related to immediate post-delivery state and potential for bleeding/hemorrhage  Goal: Patient physiologically stable as evidenced by normal lochia, palpable uterine involution and vital signs within normal limits  Outcome: PROGRESSING AS EXPECTED  Patient stable, fundus firm, lochia light.

## 2017-03-12 NOTE — PROGRESS NOTES
Patient states she just returned from visiting with her infant in NICU. Assessment donePatient rates perineal discomfort 4 out of 10 and denies need for PO analgesia at this time and states she will call for pain medication as needed. Patient states relief of perineal discomfort at this time with TUCKS and Dermoplast spray. Patient states she is using electric breast pump every 3 hours. Patient will be discharged today and is encouraged to rent electric breast pump.

## 2017-03-12 NOTE — DISCHARGE SUMMARY
[facility]  NORMAL SPONTANEOUS VAGINAL DISCHARGE SUMMARY    PATIENT ID:  NAME:  Lisbeth Hernández  MRN:               [id number]  YOB: 1995    DATE OF ADMISSION: 3/10/2017    DATE OF DISCHARGE: 3/12/2017     ADMITTING DIAGNOSIS:  1. Intrauterine pregnancy at 39w2d.      DISCHARGE DIAGNOSIS:  1. s/p         HOSPITAL COURSE: This is a 21 y.o. year old female admitted at 39w2d who presented in active labor. Pregnancy was complicated by low grade squamous intraepithelial lesion on PAP, will need repeat pap/colpo 6 weeks postpartum. The patient had a good labor pattern after admission and proceeded to deliver a viable male infant. RT was in attendance at delivery for meconium stained fluid. Infants Apgars scores were 6 and 7 at one and five minutes. The infant was taken to NICU for respiratory support.The patients postpartum course was uncomplicated and she was discharged home in stable condition on postpartum day #2.    PROCEDURES PERFORMED: Normal spontaneous vaginal delivery over second degree midline and left lateral vaginal lacerations which were repaired using 3-0 vicryl and 3-0 chromic in the usual sterile fashion.    COMPLICATIONS: None    DIET: Regular    ACTIVITY: No intercourse and nothing inserted into the vagina for 5 weeks.    MEDICATIONS:  Current Outpatient Prescriptions   Medication Sig Dispense Refill   • ibuprofen (MOTRIN) 800 MG Tab Take 1 Tab by mouth every 6 hours as needed. 30 Tab 3   • docusate sodium 100 MG Cap Take 100 mg by mouth 2 times a day as needed for Constipation. 60 Cap 3   • ferrous sulfate 325 (65 FE) MG EC tablet Take 1 Tab by mouth 3 times a day, with meals. 90 Tab 3         FOLLOWUP:  1) TPC in 5 weeks  2) Return to the hospital if copious vaginal bleeding or foul smelling discharge is noted

## 2017-03-13 NOTE — PROGRESS NOTES
Dr. Whiting came to see patient at 1630 and was notified of pulse rate of 100-108. Dr. Whiting states she will write a prescription for iron for discharge. Patient education and discharge instructions reviewed with patient with stated understanding by patient.  Patient states all questions have been answered and denies any problems.  Patient discharged to home in stable condition with all belongings.

## 2017-04-21 ENCOUNTER — POST PARTUM (OUTPATIENT)
Dept: OBGYN | Facility: CLINIC | Age: 22
End: 2017-04-21
Payer: MEDICAID

## 2017-04-21 VITALS — BODY MASS INDEX: 28.81 KG/M2 | WEIGHT: 184 LBS | DIASTOLIC BLOOD PRESSURE: 64 MMHG | SYSTOLIC BLOOD PRESSURE: 106 MMHG

## 2017-04-21 DIAGNOSIS — Z34.02 ENCOUNTER FOR SUPERVISION OF NORMAL FIRST PREGNANCY IN SECOND TRIMESTER: ICD-10-CM

## 2017-04-21 PROCEDURE — 90050 PR POSTPARTUM VISIT: CPT | Performed by: PHYSICIAN ASSISTANT

## 2017-04-21 RX ORDER — ACETAMINOPHEN AND CODEINE PHOSPHATE 120; 12 MG/5ML; MG/5ML
1 SOLUTION ORAL DAILY
Qty: 28 TAB | Refills: 11 | Status: SHIPPED | OUTPATIENT
Start: 2017-04-21 | End: 2019-06-05

## 2017-04-21 NOTE — PROGRESS NOTES
"Subjective:      Lisbeth Hernández is a 21 y.o. female who presents with postpartum visit today. 6wk s/p  at term without complication, but pt had difficultly with pain relief during the repair, and has since noted that \"it just doesn't look right down there\" and there is some pain, as it \"didnt close right.\" Pt has no complaints- denies heavy vaginal bleeding, depression, intercourse, pain or problems with BF. PAP LGSIL, but colpo not done, so pt urged to get PAP and colpo asap. Pt concerned as she doesn't have insurance, so will f/u with Gyn clinic here or [facility] or [facility]. BCM desired is IUD, though due to limited insurance, pt will use BCP in meantime - risks d/w pt, instructions given and rx written for Micronor, though pt can f/u for costs of IUD or Nexplanon when she gets PAP.            HPI    ROS       Objective:     /64 mmHg  Wt 83.462 kg (184 lb)  LMP 2016 (Approximate)  Breastfeeding? Unknown     Physical Exam   Constitutional: She appears well-developed and well-nourished.   HENT:   Head: Normocephalic and atraumatic.   Eyes: Pupils are equal, round, and reactive to light.   Neck: Normal range of motion. Neck supple. No thyromegaly present.   Cardiovascular: Normal rate, regular rhythm and normal heart sounds.    Pulmonary/Chest: Effort normal and breath sounds normal. No respiratory distress.   Abdominal: Soft. Bowel sounds are normal. She exhibits no distension. There is no tenderness.   Genitourinary: Vagina normal and uterus normal.       Pelvic exam was performed with patient supine. There is no rash or tenderness on the right labia. There is no rash or tenderness on the left labia. Uterus is not deviated, not enlarged and not tender. Cervix exhibits no motion tenderness. Right adnexum displays no mass and no tenderness. Left adnexum displays no mass and no tenderness. No erythema in the vagina. No foreign body around the vagina. No signs of injury around the vagina. " No vaginal discharge found.   Neurological: She is alert. She has normal reflexes.   Skin: Skin is warm and dry. No erythema.   Psychiatric: She has a normal mood and affect. Her behavior is normal. Thought content normal.   Vitals reviewed.              Assessment/Plan:     1. Postpartum care following vaginal delivery  - Pt to try Sitz baths, Kegel exercises, and d/w pt possible fix of vag tear at next delivery, as it is mostly healed today    - norethindrone (MICRONOR) 0.35 MG tablet; Take 1 Tab by mouth every day.  Dispense: 28 Tab; Refill: 11    2. LGSIL     - PAP asap, possible colpo needed

## 2017-04-21 NOTE — Clinical Note
April 21, 2017       Patient: Lisbeth Hernández   YOB: 1995   Date of Visit: 4/21/2017         To Whom It May Concern:    It is my medical opinion that Lisbeth Hernández may return to full duty, no restrictions as of May 8, 2017. Thank you.    If you have any questions or concerns, please don't hesitate to call [phone]          Sincerely,          DAFNE Funez.

## 2017-04-21 NOTE — MR AVS SNAPSHOT
Lisbeth Hernández   2017 2:00 PM   Post Partum   MRN: [id number]    Department[facility]  [facility]   Dept Phone:  [phone]    Description:  Female : 1995   Provider:  DALI Funez           Allergies as of 2017     Allergen Noted Reactions    Sulfa Drugs 2013       Family history of allergy, pt has not taken      You were diagnosed with     Encounter for supervision of normal first pregnancy in second trimester   [560473]       Postpartum care following vaginal delivery   [[id number]]         Vital Signs     Blood Pressure Weight Last Menstrual Period Breastfeeding? Smoking Status       106/64 mmHg 83.462 kg (184 lb) 2016 (Approximate) Unknown Former Smoker       Basic Information     Date Of Birth Sex Race Ethnicity Preferred Language    1995 Female White  Origin ([place]namese,Citizen of the [place], etc) English      Problem List              ICD-10-CM Priority Class Noted - Resolved    Low grade squamous intraepithelial lesion on cytologic smear of cervix (lgsil) - for repeat pap/colpo PRN 6 weeks PP R87.612   10/31/2016 - Present    Postpartum care following vaginal delivery Z39.2   2017 - Present      Health Maintenance        Date Due Completion Dates    IMM HEP B VACCINE (1 of 3 - Primary Series) 1995 ---    IMM HEP A VACCINE (1 of 2 - Standard Series) 1996 ---    IMM HPV VACCINE (1 of 3 - Female 3 Dose Series) 2006 ---    IMM VARICELLA (CHICKENPOX) VACCINE (1 of 2 - 2 Dose Adolescent Series) 2008 ---    IMM MENINGOCOCCAL VACCINE (MCV4) (1 of 1) 2011 ---    PAP SMEAR 10/18/2019 10/18/2016    IMM DTaP/Tdap/Td Vaccine (2 - Td) 2026            Current Immunizations     Tdap Vaccine 2016  4:18 PM      Below and/or attached are the medications your provider expects you to take. Review all of your home medications and newly ordered medications with your provider and/or pharmacist. Follow medication  instructions as directed by your provider and/or pharmacist. Please keep your medication list with you and share with your provider. Update the information when medications are discontinued, doses are changed, or new medications (including over-the-counter products) are added; and carry medication information at all times in the event of emergency situations     Allergies:  SULFA DRUGS - (reactions not documented)               Medications  Valid as of: April 21, 2017 -  3:28 PM    Generic Name Brand Name Tablet Size Instructions for use    Docusate Sodium (Cap)  MG Take 100 mg by mouth 2 times a day as needed for Constipation.        Ferrous Sulfate (Tablet Delayed Response) ferrous sulfate 325 (65 FE) MG Take 1 Tab by mouth 3 times a day, with meals.        Ibuprofen (Tab) MOTRIN 800 MG Take 1 Tab by mouth every 6 hours as needed.        Norethindrone (Tab) MICRONOR 0.35 MG Take 1 Tab by mouth every day.        Prenatal Vit-Fe Fumarate-FA   Take  by mouth.        .                 Medicines prescribed today were sent to:     [id number] - [place], [place] - [address] [place] [id number]    Phone: [phone] Fax: [phone]    Open 24 Hours?: No      Medication refill instructions:       If your prescription bottle indicates you have medication refills left, it is not necessary to call your provider’s office. Please contact your pharmacy and they will refill your medication.    If your prescription bottle indicates you do not have any refills left, you may request refills at any time through one of the following ways: The online Sling[employer]ot system (except Urgent Care), by calling your provider’s office, or by asking your pharmacy to contact your provider’s office with a refill request. Medication refills are processed only during regular business hours and may not be available until the next business day. Your provider may request additional  information or to have a follow-up visit with you prior to refilling your medication.   *Please Note: Medication refills are assigned a new Rx number when refilled electronically. Your pharmacy may indicate that no refills were authorized even though a new prescription for the same medication is available at the pharmacy. Please request the medicine by name with the pharmacy before contacting your provider for a refill.           Pretty in my Pocket (PRIMP)hart Access Code: Activation code not generated  Current Infusion Resource Status: Active

## 2017-04-21 NOTE — PROGRESS NOTES
Pt here today for postpartum exam.  Delivery Date 3/10/17  Currently: breast and bottle feeding.   BCM: pt unsure of bc, [facility].   Wt: 184lb  BP: 106/64  Good ph:[phone]  Pt states incision hasnt closed since delievery and is itching.

## 2018-07-31 ENCOUNTER — NON-PROVIDER VISIT (OUTPATIENT)
Dept: URGENT CARE | Facility: PHYSICIAN GROUP | Age: 23
End: 2018-07-31

## 2018-07-31 DIAGNOSIS — Z11.1 PPD SCREENING TEST: ICD-10-CM

## 2018-07-31 PROCEDURE — 86580 TB INTRADERMAL TEST: CPT | Performed by: PHYSICIAN ASSISTANT

## 2018-08-01 NOTE — NON-PROVIDER
Lisbeth Hernández is a 23 y.o. female here for a non-provider visit for PPD placement -- Step 1 of 1    Reason for PPD:  work requirement    1. TB evaluation questionnaire completed by patient? Yes      -  If any answers marked yes did you contact a provider prior to placing? Not Indicated  2.  Patient notified to return to clinic for reading on: 08/2/18 or 08/03/18  3.  PPD Placement documentation completed on TB evaluation questionnaire? Yes  4.  Location of TB evaluation questionnaire filed:   [facility]

## 2018-08-03 ENCOUNTER — NON-PROVIDER VISIT (OUTPATIENT)
Dept: URGENT CARE | Facility: PHYSICIAN GROUP | Age: 23
End: 2018-08-03
Payer: MEDICAID

## 2018-08-03 LAB — TB WHEAL 3D P 5 TU DIAM: 0 MM

## 2018-08-03 NOTE — PROGRESS NOTES
Lisbeth Hernández is a 23 y.o. female here for a non-provider visit for PPD reading -- Step 1 of 1.      1.  Resulted in Epic under enter/edit results? Yes   2.  TB evaluation questionnaire scanned into chart and original given to patient?Yes      3. Was induration greater than 0 mm? No.      Routed to PCP? No. No provider

## 2019-01-03 ENCOUNTER — NON-PROVIDER VISIT (OUTPATIENT)
Dept: OCCUPATIONAL MEDICINE | Facility: CLINIC | Age: 24
End: 2019-01-03

## 2019-01-03 DIAGNOSIS — Z02.1 PRE-EMPLOYMENT DRUG SCREENING: ICD-10-CM

## 2019-01-03 LAB
AMP AMPHETAMINE: NORMAL
COC COCAINE: NORMAL
INT CON NEG: NORMAL
INT CON POS: NORMAL
MET METHAMPHETAMINES: NORMAL
OPI OPIATES: NORMAL
PCP PHENCYCLIDINE: NORMAL
POC DRUG COMMENT 753798-OCCUPATIONAL HEALTH: NORMAL
THC: NORMAL

## 2019-01-03 PROCEDURE — 80305 DRUG TEST PRSMV DIR OPT OBS: CPT | Performed by: INTERNAL MEDICINE

## 2019-06-05 ENCOUNTER — OFFICE VISIT (OUTPATIENT)
Dept: INTERNAL MEDICINE | Facility: MEDICAL CENTER | Age: 24
End: 2019-06-05
Payer: COMMERCIAL

## 2019-06-05 VITALS
BODY MASS INDEX: 33.49 KG/M2 | OXYGEN SATURATION: 96 % | HEIGHT: 65 IN | TEMPERATURE: 96.9 F | HEART RATE: 93 BPM | DIASTOLIC BLOOD PRESSURE: 83 MMHG | SYSTOLIC BLOOD PRESSURE: 129 MMHG | WEIGHT: 201 LBS

## 2019-06-05 DIAGNOSIS — R87.612 LOW GRADE SQUAMOUS INTRAEPITHELIAL LESION ON CYTOLOGIC SMEAR OF CERVIX (LGSIL): ICD-10-CM

## 2019-06-05 DIAGNOSIS — Z01.118: ICD-10-CM

## 2019-06-05 DIAGNOSIS — Z76.89 ENCOUNTER TO ESTABLISH CARE: ICD-10-CM

## 2019-06-05 DIAGNOSIS — E66.09 CLASS 1 OBESITY DUE TO EXCESS CALORIES WITHOUT SERIOUS COMORBIDITY WITH BODY MASS INDEX (BMI) OF 33.0 TO 33.9 IN ADULT: ICD-10-CM

## 2019-06-05 DIAGNOSIS — Z30.41 ENCOUNTER FOR SURVEILLANCE OF CONTRACEPTIVE PILLS: ICD-10-CM

## 2019-06-05 PROBLEM — E66.811 CLASS 1 OBESITY DUE TO EXCESS CALORIES WITHOUT SERIOUS COMORBIDITY WITH BODY MASS INDEX (BMI) OF 33.0 TO 33.9 IN ADULT: Status: ACTIVE | Noted: 2019-06-05

## 2019-06-05 PROCEDURE — 99204 OFFICE O/P NEW MOD 45 MIN: CPT | Mod: GC | Performed by: INTERNAL MEDICINE

## 2019-06-05 RX ORDER — DESOGESTREL AND ETHINYL ESTRADIOL 0.15-0.03
1 KIT ORAL DAILY
COMMUNITY
End: 2023-05-18 | Stop reason: SDUPTHER

## 2019-06-05 RX ORDER — DESOGESTREL AND ETHINYL ESTRADIOL 0.15-0.03
1 KIT ORAL DAILY
COMMUNITY
End: 2019-06-05

## 2019-06-05 ASSESSMENT — ENCOUNTER SYMPTOMS
CHILLS: 0
CONSTIPATION: 0
TINGLING: 0
BLOOD IN STOOL: 0
WEIGHT LOSS: 0
HEADACHES: 0
PALPITATIONS: 0
MYALGIAS: 0
BACK PAIN: 0
NECK PAIN: 0
SPUTUM PRODUCTION: 0
FEVER: 0
DIZZINESS: 0
DOUBLE VISION: 0
FOCAL WEAKNESS: 0
ORTHOPNEA: 0
SPEECH CHANGE: 0
BLURRED VISION: 0
NERVOUS/ANXIOUS: 0
WHEEZING: 0
PHOTOPHOBIA: 0
SHORTNESS OF BREATH: 0
TREMORS: 0
DIARRHEA: 0
DEPRESSION: 0
VOMITING: 0
SINUS PAIN: 0
ABDOMINAL PAIN: 0
WEAKNESS: 0
HEARTBURN: 0
COUGH: 1
SENSORY CHANGE: 0
SORE THROAT: 0
NAUSEA: 0

## 2019-06-05 ASSESSMENT — LIFESTYLE VARIABLES: SUBSTANCE_ABUSE: 0

## 2019-06-05 ASSESSMENT — PATIENT HEALTH QUESTIONNAIRE - PHQ9: CLINICAL INTERPRETATION OF PHQ2 SCORE: 0

## 2019-06-05 NOTE — PATIENT INSTRUCTIONS
Calorie Counting for Weight Loss  Calories are energy you get from the things you eat and drink. Your body uses this energy to keep you going throughout the day. The number of calories you eat affects your weight. When you eat more calories than your body needs, your body stores the extra calories as fat. When you eat fewer calories than your body needs, your body burns fat to get the energy it needs.  Calorie counting means keeping track of how many calories you eat and drink each day. If you make sure to eat fewer calories than your body needs, you should lose weight. In order for calorie counting to work, you will need to eat the number of calories that are right for you in a day to lose a healthy amount of weight per week. A healthy amount of weight to lose per week is usually 1-2 lb (0.5-0.9 kg). A dietitian can determine how many calories you need in a day and give you suggestions on how to reach your calorie goal.   WHAT IS MY MY PLAN?  My goal is to have __________ calories per day.   If I have this many calories per day, I should lose around __________ pounds per week.  WHAT DO I NEED TO KNOW ABOUT CALORIE COUNTING?  In order to meet your daily calorie goal, you will need to:  · Find out how many calories are in each food you would like to eat. Try to do this before you eat.  · Decide how much of the food you can eat.  · Write down what you ate and how many calories it had. Doing this is called keeping a food log.  WHERE DO I FIND CALORIE INFORMATION?  The number of calories in a food can be found on a Nutrition Facts label. Note that all the information on a label is based on a specific serving of the food. If a food does not have a Nutrition Facts label, try to look up the calories online or ask your dietitian for help.  HOW DO I DECIDE HOW MUCH TO EAT?  To decide how much of the food you can eat, you will need to consider both the number of calories in one serving and the size of one serving. This  information can be found on the Nutrition Facts label. If a food does not have a Nutrition Facts label, look up the information online or ask your dietitian for help.  Remember that calories are listed per serving. If you choose to have more than one serving of a food, you will have to multiply the calories per serving by the amount of servings you plan to eat. For example, the label on a package of bread might say that a serving size is 1 slice and that there are 90 calories in a serving. If you eat 1 slice, you will have eaten 90 calories. If you eat 2 slices, you will have eaten 180 calories.  HOW DO I KEEP A FOOD LOG?  After each meal, record the following information in your food log:  · What you ate.  · How much of it you ate.  · How many calories it had.  · Then, add up your calories.  Keep your food log near you, such as in a small notebook in your pocket. Another option is to use a mobile ana maria or website. Some programs will calculate calories for you and show you how many calories you have left each time you add an item to the log.  WHAT ARE SOME CALORIE COUNTING TIPS?  · Use your calories on foods and drinks that will fill you up and not leave you hungry. Some examples of this include foods like nuts and nut butters, vegetables, lean proteins, and high-fiber foods (more than 5 g fiber per serving).  · Eat nutritious foods and avoid empty calories. Empty calories are calories you get from foods or beverages that do not have many nutrients, such as candy and soda. It is better to have a nutritious high-calorie food (such as an avocado) than a food with few nutrients (such as a bag of chips).  · Know how many calories are in the foods you eat most often. This way, you do not have to look up how many calories they have each time you eat them.  · Look out for foods that may seem like low-calorie foods but are really high-calorie foods, such as baked goods, soda, and fat-free candy.  · Pay attention to calories  in drinks. Drinks such as sodas, specialty coffee drinks, alcohol, and juices have a lot of calories yet do not fill you up. Choose low-calorie drinks like water and diet drinks.  · Focus your calorie counting efforts on higher calorie items. Logging the calories in a garden salad that contains only vegetables is less important than calculating the calories in a milk shake.  · Find a way of tracking calories that works for you. Get creative. Most people who are successful find ways to keep track of how much they eat in a day, even if they do not count every calorie.  WHAT ARE SOME PORTION CONTROL TIPS?  · Know how many calories are in a serving. This will help you know how many servings of a certain food you can have.  · Use a measuring cup to measure serving sizes. This is helpful when you start out. With time, you will be able to estimate serving sizes for some foods.  · Take some time to put servings of different foods on your favorite plates, bowls, and cups so you know what a serving looks like.  · Try not to eat straight from a bag or box. Doing this can lead to overeating. Put the amount you would like to eat in a cup or on a plate to make sure you are eating the right portion.  · Use smaller plates, glasses, and bowls to prevent overeating. This is a quick and easy way to practice portion control. If your plate is smaller, less food can fit on it.  · Try not to multitask while eating, such as watching TV or using your computer. If it is time to eat, sit down at a table and enjoy your food. Doing this will help you to start recognizing when you are full. It will also make you more aware of what and how much you are eating.  HOW CAN I CALORIE COUNT WHEN EATING OUT?  · Ask for smaller portion sizes or child-sized portions.  · Consider sharing an entree and sides instead of getting your own entree.  · If you get your own entree, eat only half. Ask for a box at the beginning of your meal and put the rest of your  "entree in it so you are not tempted to eat it.  · Look for the calories on the menu. If calories are listed, choose the lower calorie options.  · Choose dishes that include vegetables, fruits, whole grains, low-fat dairy products, and lean protein. Focusing on smart food choices from each of the 5 food groups can help you stay on track at restaurants.  · Choose items that are boiled, broiled, grilled, or steamed.  · Choose water, milk, unsweetened iced tea, or other drinks without added sugars. If you want an alcoholic beverage, choose a lower calorie option. For example, a regular denny can have up to 700 calories and a glass of wine has around 150.  · Stay away from items that are buttered, battered, fried, or served with cream sauce. Items labeled \"crispy\" are usually fried, unless stated otherwise.  · Ask for dressings, sauces, and syrups on the side. These are usually very high in calories, so do not eat much of them.  · Watch out for salads. Many people think salads are a healthy option, but this is often not the case. Many salads come with veliz, fried chicken, lots of cheese, fried chips, and dressing. All of these items have a lot of calories. If you want a salad, choose a garden salad and ask for grilled meats or steak. Ask for the dressing on the side, or ask for olive oil and vinegar or lemon to use as dressing.  · Estimate how many servings of a food you are given. For example, a serving of cooked rice is ½ cup or about the size of half a tennis ball or one cupcake wrapper. Knowing serving sizes will help you be aware of how much food you are eating at restaurants. The list below tells you how big or small some common portion sizes are based on everyday objects.  ¨ 1 oz--4 stacked dice.  ¨ 3 oz--1 deck of cards.  ¨ 1 tsp--1 dice.  ¨ 1 Tbsp--½ a Ping-Pong ball.  ¨ 2 Tbsp--1 Ping-Pong ball.  ¨ ½ cup--1 tennis ball or 1 cupcake wrapper.  ¨ 1 cup--1 baseball.     This information is not intended to " replace advice given to you by your health care provider. Make sure you discuss any questions you have with your health care provider.     Document Released: 12/18/2006 Document Revised: 01/08/2016 Document Reviewed: 10/23/2014  [employer] Interactive Patient Education ©2016 [employer]

## 2019-06-05 NOTE — PROGRESS NOTES
New Patient to Establish    Reason to establish: New patient to establish    CC: Bilateral itchy ears     HPI:   Lisbeth is a 24-year-old female who presents to the clinic accompanied with [id number] son, Shant, to establish care.    Itchy ears: 1 to 2 months ago patient started to develop bilateral itchy ears.  Patient has never had this before.  Patient reports that she has not used any new products or have any seasonal allergies.  She does use Kike pins to clear out the wax.  She denies any ringing, bleeding or changes in hearing.    Birth control: Patient is using Desogestrel/ethinyl estradiol since January.  Patient denies any headaches or vaginal bleeding.  She denies history of blood clots or of current smoking.  Patient inquiring about other methods of birth control.  Patient is currently sexually active with boyfriend and only uses birth control for protection.    History of suicide attempt: In 2014, during high school patient had a mental breakdown and had to go to [facility] for 72 hours.  She states that she has not had any recurrences. She denies any history of depression or anxiety at this time.  Denies any suicidal or homicidal ideation.    History of abnormal Pap smear: Pap in 2016 revealed low grade squamous intraepithelial lesion.  Patient was pregnant at that time and did not get a biopsy.  She has not followed up since then.      Social history:  She is currently single and works as a .   Tobacco: Smoked for a year in 2015, quit when when she was pregnant  EtOH: Patient drinks every other weekend with her friends 2-3 beverages at a time  Recreational drugs: Denies    Gynecological history:  Has been pregnant once without any miscarriages or abortions.  Had one vaginal delivery.  Her son is 2 years old.  Total pregnant once, bornin 2017, no abortions or miscarriages   Has been having regular menstrual periods with birth control.    PSHx: slaviary glands removed the ducts  submandibular 10-12 yo   Rhinoplasty in 2015    Health maintenance  Pap: due  Tetanus: due 2026       Patient Active Problem List    Diagnosis Date Noted   • Class 1 obesity due to excess calories without serious comorbidity with body mass index (BMI) of 33.0 to 33.9 in adult 06/05/2019   • Low grade squamous intraepithelial lesion on cytologic smear of cervix (lgsil) - for repeat pap/colpo PRN 6 weeks PP 10/31/2016       No past medical history on file.    Current Outpatient Prescriptions   Medication Sig Dispense Refill   • desogestrel-ethinyl estradiol (APRI) 0.15-30 MG-MCG per tablet Take 1 Tab by mouth every day.       No current facility-administered medications for this visit.        Allergies as of 06/05/2019 - Reviewed 06/05/2019   Allergen Reaction Noted   • Sulfa drugs  09/30/2013       Social History     Social History   • Marital status: Single     Spouse name: N/A   • Number of children: N/A   • Years of education: N/A     Occupational History   • Not on file.     Social History Main Topics   • Smoking status: Former Smoker     Quit date: 5/24/2016   • Smokeless tobacco: Never Used   • Alcohol use No      Comment: occasionally   • Drug use: No   • Sexual activity: Yes     Partners: Male     Birth control/ protection: Condom      Comment:  Unplanned     Other Topics Concern   • Not on file     Social History Narrative   • No narrative on file       Family History   Problem Relation Age of Onset   • Psychiatry Mother         bipolar and depression   • Psychiatry Brother         schizophrenia   • Diabetes Paternal Grandmother    • Hyperlipidemia Paternal Grandmother        Past Surgical History:   Procedure Laterality Date   • PB REPAIR OF NASAL SEPTUM  2010   • RHINOPLASTY  2009   • SUBMANDIBLE GLAND EXCISION     • TUMOR EXCISION WITH BIOPSY  2007; 2009    benign tumors under tongue X3 surgeries       ROS: As per HPI. Additional pertinent symptoms as noted below.    Review of Systems   Constitutional:  "Negative for chills, fever, malaise/fatigue and weight loss.   HENT: Positive for congestion. Negative for ear discharge, ear pain, hearing loss, sinus pain, sore throat and tinnitus.         + flaky and itchy ears   Eyes: Negative for blurred vision, double vision and photophobia.   Respiratory: Positive for cough. Negative for sputum production, shortness of breath and wheezing.    Cardiovascular: Negative for chest pain, palpitations, orthopnea and leg swelling.   Gastrointestinal: Negative for abdominal pain, blood in stool, constipation, diarrhea, heartburn, melena, nausea and vomiting.   Genitourinary: Negative for dysuria, frequency and urgency.   Musculoskeletal: Negative for back pain, joint pain, myalgias and neck pain.   Skin: Negative for itching and rash.   Neurological: Negative for dizziness, tingling, tremors, sensory change, speech change, focal weakness, weakness and headaches.   Psychiatric/Behavioral: Negative for depression, substance abuse and suicidal ideas. The patient is not nervous/anxious.        /83 (BP Location: Left arm, Patient Position: Sitting, BP Cuff Size: Adult)   Pulse 93   Temp 36.1 °C (96.9 °F) (Temporal)   Ht 1.662 m (5' 5.43\")   Wt 91.2 kg (201 lb)   LMP 05/29/2019   SpO2 96%   Breastfeeding? No   BMI 33.01 kg/m²      Physical Exam  General: Obese, friend,  female sitting in chair in no acute distress.      Eyes: Pupils equal and reactive.  Extraocular motion is intact.  No scleral icterus.    Ears: Scaly skin around external auditory canal bilateral.  No erythema or other lesions noted.  Tympanic membranes are pearly gray bilaterally with no cerumen present.    Nose: Nasal mucosa is pink and moist    Throat: Clear no erythema or exudates noted.    Neck: Supple. No lymphadenopathy noted. Thyroid not enlarged.    Lungs: Clear to auscultation bilaterally.    Cardiovascular: Regular rate and rhythm. No murmurs, rubs or gallops.    Abdomen: Soft, nontender " "to palpation. No rebound or guarding noted.    Extremities: No clubbing, cyanosis or lower extreme edema.        Assessment and Plan    Encounter to establish care  - Check baseline function CBC, CMP, TSH, HbA1c and lipid profile   - Pap due at next visit   - Tetanus due in 2026    Low grade squamous intraepithelial lesion on cytologic smear of cervix (lgsil)   - Repeat pap smear in five weeks     Encounter for surveillance of contraceptive pills  - Continue desogestrel-ethinyl estradiol   - Discussed options of IUD versus birth control   - Patient would like to proceed with OCPs   - Counseled patient that she would need to continue to refrain from smoking     Class 1 obesity due to excess calories without serious comorbidity with body mass index (BMI) of 33.0 to 33.9 in adult  - Counseled patient on losing weight weight and increasing physical activity   - Educated on refraining from \"drinking calories\" and having most of her meals consist of vegetables   - Check HbA1c and lipid profile     Abnormal exam of both ears  - Likely atopic dermatitis   - Apply Eucerin, if ineffective apply topical hydrocortisone     Follow up in five weeks for pap smear    Signed by: Toshia Robbins D.O.  "

## 2019-10-29 ENCOUNTER — IMMUNIZATION (OUTPATIENT)
Dept: SOCIAL WORK | Facility: CLINIC | Age: 24
End: 2019-10-29
Payer: COMMERCIAL

## 2019-10-29 DIAGNOSIS — Z23 NEED FOR VACCINATION: ICD-10-CM

## 2019-10-29 PROCEDURE — 90471 IMMUNIZATION ADMIN: CPT | Performed by: REGISTERED NURSE

## 2019-10-29 PROCEDURE — 90686 IIV4 VACC NO PRSV 0.5 ML IM: CPT | Performed by: REGISTERED NURSE

## 2019-11-02 ENCOUNTER — OFFICE VISIT (OUTPATIENT)
Dept: URGENT CARE | Facility: CLINIC | Age: 24
End: 2019-11-02
Payer: COMMERCIAL

## 2019-11-02 VITALS
DIASTOLIC BLOOD PRESSURE: 72 MMHG | RESPIRATION RATE: 16 BRPM | HEART RATE: 81 BPM | HEIGHT: 66 IN | SYSTOLIC BLOOD PRESSURE: 124 MMHG | OXYGEN SATURATION: 97 % | TEMPERATURE: 97.3 F | BODY MASS INDEX: 31.82 KG/M2 | WEIGHT: 198 LBS

## 2019-11-02 DIAGNOSIS — L50.0 URTICARIA DUE TO FOOD ALLERGY: Primary | ICD-10-CM

## 2019-11-02 PROCEDURE — 99203 OFFICE O/P NEW LOW 30 MIN: CPT | Performed by: PHYSICIAN ASSISTANT

## 2019-11-02 RX ORDER — HYDROXYZINE HYDROCHLORIDE 25 MG/1
25 TABLET, FILM COATED ORAL 3 TIMES DAILY PRN
Qty: 30 TAB | Refills: 0 | Status: SHIPPED | OUTPATIENT
Start: 2019-11-02 | End: 2019-11-12

## 2019-11-02 RX ORDER — PREDNISONE 20 MG/1
TABLET ORAL
Qty: 23 TAB | Refills: 0 | Status: SHIPPED | OUTPATIENT
Start: 2019-11-02 | End: 2020-02-03

## 2019-11-02 NOTE — PROGRESS NOTES
Subjective:      Pt is a 24 y.o. female who presents with Rash (body x 1 day)            HPI  This is a new problem. Pt notes hx of allergy to sulfa and had a [place] candy on Halloween night and broke out in a full body and itchy rash and noted later the next morning that sulfa is in [place]. Pt has not taken any Rx medications for this condition. Pt states the pain is a 7/10 from itching, aching in nature and worse at night. Pt denies CP, SOB, NVD, paresthesias, headaches, dizziness, change in vision,  or other joint pain. The pt's medication list, problem list, and allergies have been evaluated and reviewed during today's visit.    PMH:  Negative per pt.      PSH:  Past Surgical History:   Procedure Laterality Date   • PB REPAIR OF NASAL SEPTUM  2010   • RHINOPLASTY  2009   • SUBMANDIBLE GLAND EXCISION     • TUMOR EXCISION WITH BIOPSY  2007; 2009    benign tumors under tongue X3 surgeries       Fam Hx:    family history includes Diabetes in her paternal grandmother; Hyperlipidemia in her paternal grandmother; Psychiatric Illness in her brother and mother.  Family Status   Relation Name Status   • Mo  Alive   • Fa  Alive   • Bro  (Not Specified)   • PGMo  (Not Specified)       Soc HX:  Social History     Socioeconomic History   • Marital status: Single     Spouse name: Not on file   • Number of children: Not on file   • Years of education: Not on file   • Highest education level: Not on file   Occupational History   • Not on file   Social Needs   • Financial resource strain: Not on file   • Food insecurity:     Worry: Not on file     Inability: Not on file   • Transportation needs:     Medical: Not on file     Non-medical: Not on file   Tobacco Use   • Smoking status: Former Smoker     Last attempt to quit: 5/24/2016     Years since quitting: 3.4   • Smokeless tobacco: Never Used   Substance and Sexual Activity   • Alcohol use: No     Comment: occasionally   • Drug use: No   • Sexual activity: Yes     Partners: Male      Birth control/protection: Condom     Comment:  Unplanned   Lifestyle   • Physical activity:     Days per week: Not on file     Minutes per session: Not on file   • Stress: Not on file   Relationships   • Social connections:     Talks on phone: Not on file     Gets together: Not on file     Attends Anabaptist service: Not on file     Active member of club or organization: Not on file     Attends meetings of clubs or organizations: Not on file     Relationship status: Not on file   • Intimate partner violence:     Fear of current or ex partner: Not on file     Emotionally abused: Not on file     Physically abused: Not on file     Forced sexual activity: Not on file   Other Topics Concern   • Not on file   Social History Narrative   • Not on file         Medications:    Current Outpatient Medications:   •  predniSONE (DELTASONE) 20 MG Tab, Take 3 tabs at once PO daily x 5 days, then take 2 tabs at once daily x 3 days, then take 1 tab PO daily x 2 days, Disp: 23 Tab, Rfl: 0  •  hydrOXYzine HCl (ATARAX) 25 MG Tab, Take 1 Tab by mouth 3 times a day as needed for Itching for up to 10 days., Disp: 30 Tab, Rfl: 0  •  desogestrel-ethinyl estradiol (APRI) 0.15-30 MG-MCG per tablet, Take 1 Tab by mouth every day., Disp: , Rfl:       Allergies:  Sulfa drugs    ROS    Constitutional: Negative for fever, chills and malaise/fatigue.   HENT: Negative for congestion and sore throat.    Eyes: Negative for blurred vision, double vision and photophobia.   Respiratory: Negative for cough and shortness of breath.  Cardiovascular: Negative for chest pain and palpitations.   Gastrointestinal: Negative for heartburn, nausea, vomiting, abdominal pain, diarrhea and constipation.   Genitourinary: Negative for dysuria and flank pain.   Musculoskeletal: Negative for joint pain and myalgias.   Skin: POS for itching and rash.   Neurological: Negative for dizziness, tingling and headaches.   Endo/Heme/Allergies: Does not bruise/bleed easily.  "  Psychiatric/Behavioral: Negative for depression. The patient is not nervous/anxious.         Objective:     /72 (BP Location: Left arm, Patient Position: Sitting, BP Cuff Size: Adult)   Pulse 81   Temp 36.3 °C (97.3 °F) (Temporal)   Resp 16   Ht 1.664 m (5' 5.5\")   Wt 89.8 kg (198 lb)   LMP 10/02/2019   SpO2 97%   BMI 32.45 kg/m²      Physical Exam   Skin: Skin is warm. Capillary refill takes less than 2 seconds. Rash noted. Rash is urticarial. No cyanosis. Nails show no clubbing.               Constitutional: PT is oriented to person, place, and time. PT appears well-developed and well-nourished. No distress.   HENT:   Head: Normocephalic and atraumatic.   Mouth/Throat: Oropharynx is clear and moist. No oropharyngeal exudate.   Eyes: Conjunctivae normal and EOM are normal. Pupils are equal, round, and reactive to light.   Neck: Normal range of motion. Neck supple. No thyromegaly present.   Cardiovascular: Normal rate, regular rhythm, normal heart sounds and intact distal pulses.  Exam reveals no gallop and no friction rub.    No murmur heard.  Pulmonary/Chest: Effort normal and breath sounds normal. No respiratory distress. PT has no wheezes. PT has no rales. Pt exhibits no tenderness.   Abdominal: Soft. Bowel sounds are normal. PT exhibits no distension and no mass. There is no tenderness. There is no rebound and no guarding.   Musculoskeletal: Normal range of motion. PT exhibits no edema and no tenderness.   Neurological: PT is alert and oriented to person, place, and time. PT has normal reflexes. No cranial nerve deficit.       Psychiatric: PT has a normal mood and affect. PT behavior is normal. Judgment and thought content normal.        Assessment/Plan:     1. Urticaria due to food allergy    - predniSONE (DELTASONE) 20 MG Tab; Take 3 tabs at once PO daily x 5 days, then take 2 tabs at once daily x 3 days, then take 1 tab PO daily x 2 days  Dispense: 23 Tab; Refill: 0  - hydrOXYzine HCl " (ATARAX) 25 MG Tab; Take 1 Tab by mouth 3 times a day as needed for Itching for up to 10 days.  Dispense: 30 Tab; Refill: 0      Rest, fluids encouraged.  AVS with medical info given.  Pt was in full understanding and agreement with the plan.  Differential diagnosis, natural history, supportive care, and indications for immediate follow-up discussed. All questions answered. Patient agrees with the plan of care.  Follow-up as needed if symptoms worsen or fail to improve.

## 2020-01-21 ENCOUNTER — TELEPHONE (OUTPATIENT)
Dept: SCHEDULING | Facility: IMAGING CENTER | Age: 25
End: 2020-01-21

## 2020-02-03 ENCOUNTER — OFFICE VISIT (OUTPATIENT)
Dept: MEDICAL GROUP | Facility: MEDICAL CENTER | Age: 25
End: 2020-02-03
Payer: COMMERCIAL

## 2020-02-03 VITALS
OXYGEN SATURATION: 97 % | SYSTOLIC BLOOD PRESSURE: 124 MMHG | WEIGHT: 197 LBS | DIASTOLIC BLOOD PRESSURE: 70 MMHG | HEART RATE: 97 BPM | HEIGHT: 66 IN | RESPIRATION RATE: 16 BRPM | BODY MASS INDEX: 31.66 KG/M2

## 2020-02-03 DIAGNOSIS — Z00.00 ROUTINE GENERAL MEDICAL EXAMINATION AT A HEALTH CARE FACILITY: ICD-10-CM

## 2020-02-03 DIAGNOSIS — Z23 NEED FOR VARICELLA VACCINE: ICD-10-CM

## 2020-02-03 DIAGNOSIS — R53.83 FATIGUE, UNSPECIFIED TYPE: ICD-10-CM

## 2020-02-03 DIAGNOSIS — Z87.42 H/O ABNORMAL CERVICAL PAPANICOLAOU SMEAR: ICD-10-CM

## 2020-02-03 DIAGNOSIS — Z23 NEED FOR HPV VACCINE: ICD-10-CM

## 2020-02-03 DIAGNOSIS — E66.09 CLASS 1 OBESITY DUE TO EXCESS CALORIES WITHOUT SERIOUS COMORBIDITY WITH BODY MASS INDEX (BMI) OF 33.0 TO 33.9 IN ADULT: ICD-10-CM

## 2020-02-03 DIAGNOSIS — N92.0 MENORRHAGIA WITH REGULAR CYCLE: ICD-10-CM

## 2020-02-03 PROCEDURE — 90472 IMMUNIZATION ADMIN EACH ADD: CPT | Performed by: NURSE PRACTITIONER

## 2020-02-03 PROCEDURE — 90471 IMMUNIZATION ADMIN: CPT | Performed by: NURSE PRACTITIONER

## 2020-02-03 PROCEDURE — 90651 9VHPV VACCINE 2/3 DOSE IM: CPT | Performed by: NURSE PRACTITIONER

## 2020-02-03 PROCEDURE — 90716 VAR VACCINE LIVE SUBQ: CPT | Performed by: NURSE PRACTITIONER

## 2020-02-03 PROCEDURE — 99203 OFFICE O/P NEW LOW 30 MIN: CPT | Mod: 25 | Performed by: NURSE PRACTITIONER

## 2020-02-03 RX ORDER — FERROUS SULFATE 325(65) MG
325 TABLET ORAL DAILY
Qty: 30 TAB | Refills: 11 | Status: SHIPPED | OUTPATIENT
Start: 2020-02-03 | End: 2020-06-18 | Stop reason: SDUPTHER

## 2020-02-03 ASSESSMENT — PATIENT HEALTH QUESTIONNAIRE - PHQ9: CLINICAL INTERPRETATION OF PHQ2 SCORE: 0

## 2020-02-03 NOTE — PROGRESS NOTES
Subjective:      Lisbeth Hernández is a 24 y.o. female who presents with Establish Care        CC: Patient is here today to establish care as well as for issues including fatigue and gynecology referral.  She had previously been seen at the [facility] clinic in June of last year.    HPI       1. Fatigue, unspecified type  Patient states for a number of weeks she has felt more fatigued than usual.  She admits she does tend to have heavy menstrual periods which may be contributing to this.  Her last CBC in 2017 showed an anemia and elevated white blood cell count through gynecology.  She states when she gives blood they tell her something is unusual with her CBC and she cannot get blood any longer.  Lab work ordered in June from her previous PCP was not completed.    2. H/O abnormal cervical Papanicolaou smear  Patient's last Pap smear was in 2016 and results showed low-grade squamous intraepithelial lesion but she never followed back for colposcopy but is willing to do so now.    3. Menorrhagia with regular cycle  Patient states she tends to have regular periods now that she is on the birth control pill but still has heavy periods.    4. Class 1 obesity due to excess calories without serious comorbidity with body mass index (BMI) of 33.0 to 33.9 in adult  Patient obese    5. Need for HPV vaccine  Patient does not think she has had this    6. Need for varicella vaccine  Patient states she has not had chickenpox or the vaccine    7. Routine general medical examination at a health care facility  Patient due for some lab work  History reviewed. No pertinent past medical history.  Social History     Socioeconomic History   • Marital status: Single     Spouse name: Not on file   • Number of children: Not on file   • Years of education: Not on file   • Highest education level: Not on file   Occupational History   • Not on file   Social Needs   • Financial resource strain: Not on file   • Food insecurity:     Worry: Not on file      Inability: Not on file   • Transportation needs:     Medical: Not on file     Non-medical: Not on file   Tobacco Use   • Smoking status: Former Smoker     Packs/day: 0.00     Last attempt to quit: 5/24/2016     Years since quitting: 3.6   • Smokeless tobacco: Never Used   Substance and Sexual Activity   • Alcohol use: Yes     Comment: occasionally   • Drug use: No   • Sexual activity: Yes     Partners: Male     Birth control/protection: Condom, Pill     Comment:  Unplanned   Lifestyle   • Physical activity:     Days per week: Not on file     Minutes per session: Not on file   • Stress: Not on file   Relationships   • Social connections:     Talks on phone: Not on file     Gets together: Not on file     Attends Synagogue service: Not on file     Active member of club or organization: Not on file     Attends meetings of clubs or organizations: Not on file     Relationship status: Not on file   • Intimate partner violence:     Fear of current or ex partner: Not on file     Emotionally abused: Not on file     Physically abused: Not on file     Forced sexual activity: Not on file   Other Topics Concern   • Not on file   Social History Narrative   • Not on file     Current Outpatient Medications   Medication Sig Dispense Refill   • ferrous sulfate 325 (65 Fe) MG tablet Take 1 Tab by mouth every day. 30 Tab 11   • desogestrel-ethinyl estradiol (APRI) 0.15-30 MG-MCG per tablet Take 1 Tab by mouth every day.       No current facility-administered medications for this visit.      Family History   Problem Relation Age of Onset   • Psychiatric Illness Mother         bipolar and depression   • Psychiatric Illness Brother         schizophrenia   • Diabetes Paternal Grandmother    • Hyperlipidemia Paternal Grandmother          Review of Systems   Constitutional: Positive for malaise/fatigue.   All other systems reviewed and are negative.         Objective:     /70 (BP Location: Right arm, Patient Position: Sitting, BP  "Cuff Size: Adult)   Pulse 97   Resp 16   Ht 1.664 m (5' 5.5\")   Wt 89.4 kg (197 lb)   SpO2 97%   BMI 32.28 kg/m²      Physical Exam  Vitals signs and nursing note reviewed.   Constitutional:       General: She is not in acute distress.     Appearance: She is well-developed. She is not diaphoretic.   HENT:      Head: Normocephalic and atraumatic.      Right Ear: External ear normal.      Left Ear: External ear normal.      Nose: Nose normal.   Eyes:      General:         Right eye: No discharge.         Left eye: No discharge.   Neck:      Musculoskeletal: Normal range of motion and neck supple.      Thyroid: No thyromegaly.   Cardiovascular:      Rate and Rhythm: Normal rate and regular rhythm.      Heart sounds: Normal heart sounds. No murmur. No friction rub. No gallop.    Pulmonary:      Effort: Pulmonary effort is normal.      Breath sounds: Normal breath sounds. No wheezing or rales.   Musculoskeletal:         General: No tenderness.   Skin:     General: Skin is warm and dry.      Findings: No rash.   Neurological:      Mental Status: She is alert and oriented to person, place, and time.      Deep Tendon Reflexes: Reflexes normal.   Psychiatric:         Behavior: Behavior normal.         Thought Content: Thought content normal.         Judgment: Judgment normal.                 Assessment/Plan:       1. Fatigue, unspecified type  I will check for underlying issues such as thyroid disease, diabetes and anemia.  It appears she has been anemic in the past and she states she is having heavy periods so I will have her try iron daily until we get her lab results.  If she is having persistent elevations of white blood cell count, she will need to be referred to hematology.  Unfortunately we do not have any lab work for a few years.  - CBC WITHOUT DIFFERENTIAL; Future  - TSH; Future  - ferrous sulfate 325 (65 Fe) MG tablet; Take 1 Tab by mouth every day.  Dispense: 30 Tab; Refill: 11    2. H/O abnormal cervical " Papanicolaou smear  Patient has history of low-grade squamous intraepithelial lesion for which she has up to now not followed up for colposcopy or biopsy so new referral has been placed.  - REFERRAL TO GYNECOLOGY    3. Menorrhagia with regular cycle  Patient still on birth control from her previous PCP.    4. Class 1 obesity due to excess calories without serious comorbidity with body mass index (BMI) of 33.0 to 33.9 in adult    - Patient identified as having weight management issue.  Appropriate orders and counseling given.  - REFERRAL TO [facility] IMPROVEMENT PROGRAMS (HIP) Services Requested: Weight Management Program, Physician Medical Weight Management Program; Reason for Referral? BMI>30; Reason for Visit: Overweight/Obesity; Future    5. Need for HPV vaccine  I have placed the below orders and discussed them with an approved delegating provider. The MA is performing the below orders under the direction of Dr. Schuler    - 9VHPV Vaccine 2-3 Dose IM    6. Need for varicella vaccine  I have placed the below orders and discussed them with an approved delegating provider. The MA is performing the below orders under the direction of Dr. Schuler    - Varicella SQ    7. Routine general medical examination at a health care facility    - Comp Metabolic Panel; Future  - Lipid Profile; Future

## 2020-02-08 ENCOUNTER — HOSPITAL ENCOUNTER (OUTPATIENT)
Dept: LAB | Facility: MEDICAL CENTER | Age: 25
End: 2020-02-08
Attending: NURSE PRACTITIONER
Payer: COMMERCIAL

## 2020-02-08 DIAGNOSIS — Z00.00 ROUTINE GENERAL MEDICAL EXAMINATION AT A HEALTH CARE FACILITY: ICD-10-CM

## 2020-02-08 DIAGNOSIS — R53.83 FATIGUE, UNSPECIFIED TYPE: ICD-10-CM

## 2020-02-08 LAB
ALBUMIN SERPL BCP-MCNC: 4.1 G/DL (ref 3.2–4.9)
ALBUMIN/GLOB SERPL: 1.3 G/DL
ALP SERPL-CCNC: 47 U/L (ref 30–99)
ALT SERPL-CCNC: 36 U/L (ref 2–50)
ANION GAP SERPL CALC-SCNC: 9 MMOL/L (ref 0–11.9)
AST SERPL-CCNC: 31 U/L (ref 12–45)
BILIRUB SERPL-MCNC: 0.4 MG/DL (ref 0.1–1.5)
BUN SERPL-MCNC: 13 MG/DL (ref 8–22)
CALCIUM SERPL-MCNC: 9.1 MG/DL (ref 8.5–10.5)
CHLORIDE SERPL-SCNC: 107 MMOL/L (ref 96–112)
CHOLEST SERPL-MCNC: 170 MG/DL (ref 100–199)
CO2 SERPL-SCNC: 23 MMOL/L (ref 20–33)
CREAT SERPL-MCNC: 0.59 MG/DL (ref 0.5–1.4)
ERYTHROCYTE [DISTWIDTH] IN BLOOD BY AUTOMATED COUNT: 41.5 FL (ref 35.9–50)
FASTING STATUS PATIENT QL REPORTED: NORMAL
GLOBULIN SER CALC-MCNC: 3.2 G/DL (ref 1.9–3.5)
GLUCOSE SERPL-MCNC: 86 MG/DL (ref 65–99)
HCT VFR BLD AUTO: 34.7 % (ref 37–47)
HDLC SERPL-MCNC: 52 MG/DL
HGB BLD-MCNC: 10.1 G/DL (ref 12–16)
LDLC SERPL CALC-MCNC: 92 MG/DL
MCH RBC QN AUTO: 21 PG (ref 27–33)
MCHC RBC AUTO-ENTMCNC: 29.1 G/DL (ref 33.6–35)
MCV RBC AUTO: 72.3 FL (ref 81.4–97.8)
PLATELET # BLD AUTO: 414 K/UL (ref 164–446)
PMV BLD AUTO: 12.1 FL (ref 9–12.9)
POTASSIUM SERPL-SCNC: 4 MMOL/L (ref 3.6–5.5)
PROT SERPL-MCNC: 7.3 G/DL (ref 6–8.2)
RBC # BLD AUTO: 4.8 M/UL (ref 4.2–5.4)
SODIUM SERPL-SCNC: 139 MMOL/L (ref 135–145)
TRIGL SERPL-MCNC: 128 MG/DL (ref 0–149)
TSH SERPL DL<=0.005 MIU/L-ACNC: 0.86 UIU/ML (ref 0.38–5.33)
WBC # BLD AUTO: 7.1 K/UL (ref 4.8–10.8)

## 2020-02-08 PROCEDURE — 85027 COMPLETE CBC AUTOMATED: CPT

## 2020-02-08 PROCEDURE — 36415 COLL VENOUS BLD VENIPUNCTURE: CPT

## 2020-02-08 PROCEDURE — 84443 ASSAY THYROID STIM HORMONE: CPT

## 2020-02-08 PROCEDURE — 80053 COMPREHEN METABOLIC PANEL: CPT

## 2020-02-08 PROCEDURE — 80061 LIPID PANEL: CPT

## 2020-02-24 ENCOUNTER — TELEPHONE (OUTPATIENT)
Dept: MEDICAL GROUP | Facility: MEDICAL CENTER | Age: 25
End: 2020-02-24

## 2020-02-24 NOTE — TELEPHONE ENCOUNTER
Patient lvm, she said she is allergic to Sulfa and you prescribed her ferrous sulfate 325 (65 Fe) MG      and would like to know if she can take it or not?

## 2020-02-25 NOTE — TELEPHONE ENCOUNTER
Ferrous sulfate or iron is not related to sulfa so there should be no problem with this.  She can also check with her pharmacist.

## 2020-02-29 ENCOUNTER — OFFICE VISIT (OUTPATIENT)
Dept: URGENT CARE | Facility: PHYSICIAN GROUP | Age: 25
End: 2020-02-29
Payer: COMMERCIAL

## 2020-02-29 VITALS
SYSTOLIC BLOOD PRESSURE: 112 MMHG | HEIGHT: 65 IN | TEMPERATURE: 98 F | HEART RATE: 98 BPM | OXYGEN SATURATION: 99 % | WEIGHT: 195 LBS | RESPIRATION RATE: 15 BRPM | BODY MASS INDEX: 32.49 KG/M2 | DIASTOLIC BLOOD PRESSURE: 66 MMHG

## 2020-02-29 DIAGNOSIS — J32.9 RHINOSINUSITIS: Primary | ICD-10-CM

## 2020-02-29 DIAGNOSIS — R52 BODY ACHES: ICD-10-CM

## 2020-02-29 LAB
FLUAV+FLUBV AG SPEC QL IA: NORMAL
INT CON NEG: NEGATIVE
INT CON POS: POSITIVE

## 2020-02-29 PROCEDURE — 87804 INFLUENZA ASSAY W/OPTIC: CPT | Performed by: FAMILY MEDICINE

## 2020-02-29 PROCEDURE — 99212 OFFICE O/P EST SF 10 MIN: CPT | Performed by: FAMILY MEDICINE

## 2020-02-29 RX ORDER — LORATADINE 10 MG/1
10 CAPSULE, LIQUID FILLED ORAL DAILY
Qty: 20 CAP | Refills: 0 | Status: SHIPPED | OUTPATIENT
Start: 2020-02-29 | End: 2020-06-18

## 2020-02-29 RX ORDER — FLUTICASONE PROPIONATE 50 MCG
1 SPRAY, SUSPENSION (ML) NASAL DAILY
Qty: 16 G | Refills: 0 | Status: SHIPPED | OUTPATIENT
Start: 2020-02-29 | End: 2021-04-01

## 2020-02-29 RX ORDER — BENZONATATE 100 MG/1
100 CAPSULE ORAL 3 TIMES DAILY PRN
Qty: 30 CAP | Refills: 0 | Status: SHIPPED | OUTPATIENT
Start: 2020-02-29 | End: 2020-06-18

## 2020-02-29 ASSESSMENT — ENCOUNTER SYMPTOMS
TROUBLE SWALLOWING: 0
EYES NEGATIVE: 1
CONSTITUTIONAL NEGATIVE: 1
VOMITING: 0
STRIDOR: 0
HOARSE VOICE: 0
COUGH: 1
NECK PAIN: 0
SWOLLEN GLANDS: 0
CARDIOVASCULAR NEGATIVE: 1
DIARRHEA: 0
SHORTNESS OF BREATH: 0
ABDOMINAL PAIN: 0
HEADACHES: 0

## 2020-02-29 ASSESSMENT — FIBROSIS 4 INDEX: FIB4 SCORE: 0.3

## 2020-02-29 NOTE — LETTER
February 29, 2020         Patient: Lisbeth Hernández   YOB: 1995   Date of Visit: 2/29/2020           To Whom it May Concern:    Lisbeth Hernández was seen in my clinic on 2/29/2020. She may return to work on 3/3/20    If you have any questions or concerns, please don't hesitate to call.        Sincerely,           Graham Davis M.D.  Electronically Signed

## 2020-03-01 NOTE — PATIENT INSTRUCTIONS

## 2020-03-01 NOTE — PROGRESS NOTES
"Subjective:      Lisbeth Hernández is a 24 y.o. female who presents with Pharyngitis (onset yesterday, fever, body aches, cough)            Patient has also been taking amoxicillin 500 mg 3 times a day for the past 1 week prescribed by the dentist for the wisdom tooth removal    Pharyngitis    This is a new problem. The current episode started yesterday. The problem has been unchanged. Neither side of throat is experiencing more pain than the other. The maximum temperature recorded prior to her arrival was 101 - 101.9 F. The pain is at a severity of 3/10. The pain is mild. Associated symptoms include congestion and coughing. Pertinent negatives include no abdominal pain, diarrhea, drooling, ear discharge, ear pain, headaches, hoarse voice, plugged ear sensation, neck pain, shortness of breath, stridor, swollen glands, trouble swallowing or vomiting. She has had no exposure to strep or mono. She has tried nothing for the symptoms. The treatment provided no relief.       Review of Systems   Constitutional: Negative.    HENT: Positive for congestion. Negative for drooling, ear discharge, ear pain, hoarse voice and trouble swallowing.    Eyes: Negative.    Respiratory: Positive for cough. Negative for shortness of breath and stridor.    Cardiovascular: Negative.    Gastrointestinal: Negative for abdominal pain, diarrhea and vomiting.   Genitourinary: Negative.    Musculoskeletal: Negative for neck pain.   Skin: Negative.    Neurological: Negative for headaches.   All other systems reviewed and are negative.         Objective:     /66   Pulse 98   Temp 36.7 °C (98 °F)   Resp 15   Ht 1.651 m (5' 5\")   Wt 88.5 kg (195 lb)   SpO2 99%   BMI 32.45 kg/m²      Physical Exam  Constitutional:       General: She is not in acute distress.     Appearance: Normal appearance. She is normal weight. She is not ill-appearing, toxic-appearing or diaphoretic.   HENT:      Head: Normocephalic and atraumatic.      Right Ear: " Tympanic membrane normal. There is no impacted cerumen.      Left Ear: Tympanic membrane normal. There is no impacted cerumen.      Nose: Nose normal. No congestion or rhinorrhea.      Mouth/Throat:      Mouth: Mucous membranes are dry.      Pharynx: No oropharyngeal exudate or posterior oropharyngeal erythema.   Eyes:      Extraocular Movements: Extraocular movements intact.   Cardiovascular:      Rate and Rhythm: Normal rate.      Pulses: Normal pulses.   Pulmonary:      Effort: Pulmonary effort is normal.   Musculoskeletal: Normal range of motion.   Skin:     General: Skin is warm.      Capillary Refill: Capillary refill takes less than 2 seconds.   Neurological:      General: No focal deficit present.      Mental Status: She is alert.   Psychiatric:         Mood and Affect: Mood normal.                 Assessment/Plan:       1. Body aches  1. Rhinosinusitis  fluticasone (FLONASE) 50 MCG/ACT nasal spray    benzonatate (TESSALON) 100 MG Cap    Loratadine (CLARITIN) 10 MG Cap   2. Body aches  POCT Influenza A/B    fluticasone (FLONASE) 50 MCG/ACT nasal spray    benzonatate (TESSALON) 100 MG Cap    Loratadine (CLARITIN) 10 MG Cap       - POCT Influenza A/B   INFLUENZA WAS NEGATIVE     Sinus symptoms most consistent with acute rhinosinusitis, influenza was negative, patient has been on amoxicillin for 1 week, recently finished the dose, at this time patient does not need any more antibiotic, will have the patient take Flonase and Claritin as needed and cough medicine  Patient use Tylenol and or ibuprofen as needed for pain, salt water gargling for sore throat, and tea and honey  All the red flag signs were reviewed with the patient, please come back to clinic as needed or if your symptoms worsen

## 2020-03-29 NOTE — CARE PLAN
Hospitalist Problem: Altered physiologic condition related to immediate post-delivery state and potential for bleeding/hemorrhage  Goal: Patient physiologically stable as evidenced by normal lochia, palpable uterine involution and vital signs within normal limits  Outcome: PROGRESSING AS EXPECTED  Patient stable, lochia light.

## 2020-06-18 ENCOUNTER — OFFICE VISIT (OUTPATIENT)
Dept: MEDICAL GROUP | Facility: MEDICAL CENTER | Age: 25
End: 2020-06-18
Payer: COMMERCIAL

## 2020-06-18 VITALS
BODY MASS INDEX: 33.15 KG/M2 | SYSTOLIC BLOOD PRESSURE: 128 MMHG | HEIGHT: 65 IN | RESPIRATION RATE: 16 BRPM | OXYGEN SATURATION: 98 % | HEART RATE: 88 BPM | WEIGHT: 199 LBS | DIASTOLIC BLOOD PRESSURE: 72 MMHG

## 2020-06-18 DIAGNOSIS — L24.9 IRRITANT CONTACT DERMATITIS, UNSPECIFIED TRIGGER: ICD-10-CM

## 2020-06-18 DIAGNOSIS — D50.0 IRON DEFICIENCY ANEMIA DUE TO CHRONIC BLOOD LOSS: ICD-10-CM

## 2020-06-18 DIAGNOSIS — R87.612 LOW GRADE SQUAMOUS INTRAEPITHELIAL LESION ON CYTOLOGIC SMEAR OF CERVIX (LGSIL): ICD-10-CM

## 2020-06-18 PROCEDURE — 99214 OFFICE O/P EST MOD 30 MIN: CPT | Performed by: NURSE PRACTITIONER

## 2020-06-18 RX ORDER — CETIRIZINE HYDROCHLORIDE 10 MG/1
10 TABLET ORAL DAILY
Qty: 30 TAB | Refills: 11 | COMMUNITY
Start: 2020-06-18 | End: 2021-04-01

## 2020-06-18 RX ORDER — FAMOTIDINE 20 MG/1
20 TABLET, FILM COATED ORAL 2 TIMES DAILY
Qty: 60 TAB | Refills: 1 | Status: SHIPPED | OUTPATIENT
Start: 2020-06-18 | End: 2021-04-01

## 2020-06-18 RX ORDER — TRIAMCINOLONE ACETONIDE 1 MG/G
1 CREAM TOPICAL 2 TIMES DAILY
Qty: 1 TUBE | Refills: 0 | Status: SHIPPED | OUTPATIENT
Start: 2020-06-18 | End: 2021-04-01

## 2020-06-18 RX ORDER — FERROUS SULFATE 325(65) MG
325 TABLET ORAL
Qty: 60 TAB | Refills: 11 | Status: SHIPPED | OUTPATIENT
Start: 2020-06-18 | End: 2021-04-01

## 2020-06-18 RX ORDER — METHYLPREDNISOLONE 4 MG/1
TABLET ORAL
Qty: 21 TAB | Refills: 0 | Status: SHIPPED | OUTPATIENT
Start: 2020-06-18 | End: 2021-04-01

## 2020-06-18 ASSESSMENT — FIBROSIS 4 INDEX: FIB4 SCORE: 0.31

## 2020-06-18 NOTE — PROGRESS NOTES
Subjective:      Lisbeth Hernández is a 25 y.o. female who presents with Rash        CC: Patient is here today accompanied by her son for skin rash as well as possibly needing referral to gynecology.    HPI       1. Irritant contact dermatitis, unspecified trigger  Patient states she has a history of eczema but she developed a pruritic rash about 1-1/2 weeks ago which is not improving.  It mostly affects her upper arms bilaterally but also has been starting to affect her anterior upper legs.  She states it is mildly to moderate pruritic and nothing makes it better or worse.  She does tend to scratch at the areas.  She states she has not used anything over-the-counter to treat.  She states she otherwise feels her normal self.    2. Low grade squamous intraepithelial lesion on cytologic smear of cervix (lgsil) - for repeat pap/colpo PRN 6 weeks PP  Patient had low-grade squamous intraepithelial lesion at another office in 2016 and was supposed to follow-up for colposcopy but did not do so.  When I saw her in February I placed a referral for her to see gynecology but apparently 1 visit was rescheduled and then she no showed.    3. Iron deficiency anemia due to chronic blood loss  Patient has history of anemia secondary to her heavy menses and a CBC in February showed hemoglobin of 10 and hematocrit of 34 with microcytosis.  She is taking 1 a day iron.  She is on birth control pills which she states have made her periods more regular but she still will have 5 days of heavy menstrual flow.  Past Medical History:   Diagnosis Date   • Iron deficiency anemia due to chronic blood loss 6/18/2020     Social History     Socioeconomic History   • Marital status: Single     Spouse name: Not on file   • Number of children: Not on file   • Years of education: Not on file   • Highest education level: Not on file   Occupational History   • Not on file   Social Needs   • Financial resource strain: Not on file   • Food insecurity      Worry: Not on file     Inability: Not on file   • Transportation needs     Medical: Not on file     Non-medical: Not on file   Tobacco Use   • Smoking status: Former Smoker     Packs/day: 0.00     Last attempt to quit: 2016     Years since quittin.0   • Smokeless tobacco: Never Used   Substance and Sexual Activity   • Alcohol use: Yes     Comment: occasionally   • Drug use: No   • Sexual activity: Yes     Partners: Male     Birth control/protection: Condom, Pill     Comment:  Unplanned   Lifestyle   • Physical activity     Days per week: Not on file     Minutes per session: Not on file   • Stress: Not on file   Relationships   • Social connections     Talks on phone: Not on file     Gets together: Not on file     Attends Faith service: Not on file     Active member of club or organization: Not on file     Attends meetings of clubs or organizations: Not on file     Relationship status: Not on file   • Intimate partner violence     Fear of current or ex partner: Not on file     Emotionally abused: Not on file     Physically abused: Not on file     Forced sexual activity: Not on file   Other Topics Concern   • Not on file   Social History Narrative   • Not on file     Current Outpatient Medications   Medication Sig Dispense Refill   • ferrous sulfate 325 (65 Fe) MG tablet Take 1 Tab by mouth 2 Times a Day. 60 Tab 11   • cetirizine (ZYRTEC) 10 MG Tab Take 1 Tab by mouth every day. 30 Tab 11   • famotidine (PEPCID) 20 MG Tab Take 1 Tab by mouth 2 times a day. 60 Tab 1   • methylPREDNISolone (MEDROL DOSEPAK) 4 MG Tablet Therapy Pack As directed on the packaging label. 21 Tab 0   • triamcinolone acetonide (KENALOG) 0.1 % Cream Apply 1 Application to affected area(s) 2 times a day. 1 Tube 0   • fluticasone (FLONASE) 50 MCG/ACT nasal spray Spray 1 Spray in nose every day. 16 g 0   • desogestrel-ethinyl estradiol (APRI) 0.15-30 MG-MCG per tablet Take 1 Tab by mouth every day.       No current  "facility-administered medications for this visit.      Family History   Problem Relation Age of Onset   • Psychiatric Illness Mother         bipolar and depression   • Psychiatric Illness Brother         schizophrenia   • Diabetes Paternal Grandmother    • Hyperlipidemia Paternal Grandmother          Review of Systems   Skin: Positive for itching and rash.   All other systems reviewed and are negative.         Objective:     /72 (BP Location: Right arm, Patient Position: Sitting, BP Cuff Size: Adult)   Pulse 88   Resp 16   Ht 1.651 m (5' 5\")   Wt 90.3 kg (199 lb)   SpO2 98%   BMI 33.12 kg/m²      Physical Exam  Vitals signs and nursing note reviewed.   Constitutional:       General: She is not in acute distress.     Appearance: She is well-developed. She is not diaphoretic.   HENT:      Head: Normocephalic and atraumatic.      Right Ear: External ear normal.      Left Ear: External ear normal.      Nose: Nose normal.   Eyes:      General:         Right eye: No discharge.         Left eye: No discharge.   Neck:      Musculoskeletal: Normal range of motion and neck supple.      Thyroid: No thyromegaly.   Cardiovascular:      Rate and Rhythm: Normal rate and regular rhythm.      Heart sounds: Normal heart sounds. No murmur. No friction rub. No gallop.    Pulmonary:      Effort: Pulmonary effort is normal.      Breath sounds: Normal breath sounds. No wheezing or rales.   Musculoskeletal:         General: No tenderness.   Skin:     General: Skin is warm and dry.      Findings: Rash present.      Comments: Maculopapular erythematous rash of the upper arms bilaterally near the axilla and reported also on the upper legs bilaterally.   Neurological:      Mental Status: She is alert and oriented to person, place, and time.      Deep Tendon Reflexes: Reflexes normal.   Psychiatric:         Behavior: Behavior normal.         Thought Content: Thought content normal.         Judgment: Judgment normal.               "   Assessment/Plan:       1. Irritant contact dermatitis, unspecified trigger  I advised patient not to use any creams or ointments on the areas except what I prescribed.  She should try to avoid any possible irritants although she states there has been no new soaps or detergents.  I will have her go on an H1 and H2 blocker as well as a Medrol Dosepak.  She then may use some steroid cream for up to 5 to 10 days if needed.  If it does not improve I recommended a referral to dermatology.  - cetirizine (ZYRTEC) 10 MG Tab; Take 1 Tab by mouth every day.  Dispense: 30 Tab; Refill: 11  - famotidine (PEPCID) 20 MG Tab; Take 1 Tab by mouth 2 times a day.  Dispense: 60 Tab; Refill: 1  - methylPREDNISolone (MEDROL DOSEPAK) 4 MG Tablet Therapy Pack; As directed on the packaging label.  Dispense: 21 Tab; Refill: 0  - triamcinolone acetonide (KENALOG) 0.1 % Cream; Apply 1 Application to affected area(s) 2 times a day.  Dispense: 1 Tube; Refill: 0    2. Low grade squamous intraepithelial lesion on cytologic smear of cervix (lgsil) - for repeat pap/colpo PRN 6 weeks PP  I stressed to patient the importance again of following with gynecology and another referral was placed and I told her to start calling them regularly to get an appointment for new Pap smear and possible colposcopy.  - REFERRAL TO GYNECOLOGY    3. Iron deficiency anemia due to chronic blood loss  Patient will start taking iron twice a day because of her heavy menstrual flow and continue on her birth control pills until she can get into gynecology to look at options such as IUD or Nexplanon.  - ferrous sulfate 325 (65 Fe) MG tablet; Take 1 Tab by mouth 2 Times a Day.  Dispense: 60 Tab; Refill: 11

## 2020-07-22 ENCOUNTER — GYNECOLOGY VISIT (OUTPATIENT)
Dept: OBGYN | Facility: CLINIC | Age: 25
End: 2020-07-22
Payer: COMMERCIAL

## 2020-07-22 ENCOUNTER — HOSPITAL ENCOUNTER (OUTPATIENT)
Facility: MEDICAL CENTER | Age: 25
End: 2020-07-22
Attending: OBSTETRICS & GYNECOLOGY
Payer: COMMERCIAL

## 2020-07-22 VITALS — WEIGHT: 199 LBS | DIASTOLIC BLOOD PRESSURE: 67 MMHG | SYSTOLIC BLOOD PRESSURE: 128 MMHG | BODY MASS INDEX: 33.12 KG/M2

## 2020-07-22 DIAGNOSIS — Z87.42 HISTORY OF ABNORMAL CERVICAL PAP SMEAR: ICD-10-CM

## 2020-07-22 DIAGNOSIS — Z00.00 ANNUAL PHYSICAL EXAM: ICD-10-CM

## 2020-07-22 DIAGNOSIS — N92.0 MENORRHAGIA WITH REGULAR CYCLE: ICD-10-CM

## 2020-07-22 PROBLEM — H90.5: Status: RESOLVED | Noted: 2020-07-22 | Resolved: 2020-07-22

## 2020-07-22 PROBLEM — F78.A9: Status: ACTIVE | Noted: 2020-07-22

## 2020-07-22 PROBLEM — Q87.89: Status: ACTIVE | Noted: 2020-07-22

## 2020-07-22 PROBLEM — Z15.1: Status: ACTIVE | Noted: 2020-07-22

## 2020-07-22 PROBLEM — Q87.89: Status: RESOLVED | Noted: 2020-07-22 | Resolved: 2020-07-22

## 2020-07-22 PROBLEM — H90.5: Status: ACTIVE | Noted: 2020-07-22

## 2020-07-22 PROBLEM — E88.1: Status: ACTIVE | Noted: 2020-07-22

## 2020-07-22 PROBLEM — F78.A9: Status: RESOLVED | Noted: 2020-07-22 | Resolved: 2020-07-22

## 2020-07-22 PROBLEM — Z15.1: Status: RESOLVED | Noted: 2020-07-22 | Resolved: 2020-07-22

## 2020-07-22 PROBLEM — E88.1: Status: RESOLVED | Noted: 2020-07-22 | Resolved: 2020-07-22

## 2020-07-22 PROCEDURE — 87591 N.GONORRHOEAE DNA AMP PROB: CPT

## 2020-07-22 PROCEDURE — 99395 PREV VISIT EST AGE 18-39: CPT | Performed by: OBSTETRICS & GYNECOLOGY

## 2020-07-22 PROCEDURE — 87491 CHLMYD TRACH DNA AMP PROBE: CPT

## 2020-07-22 PROCEDURE — 88175 CYTOPATH C/V AUTO FLUID REDO: CPT

## 2020-07-22 ASSESSMENT — FIBROSIS 4 INDEX: FIB4 SCORE: 0.31

## 2020-07-22 NOTE — NON-PROVIDER
Pt here for new pt appt  Pt states no concerns   Pharmacy verified  Good #:   LMP:07/07/2020  PAP:2016 Alfredo  BC:[facility]

## 2020-07-23 DIAGNOSIS — Z00.00 ANNUAL PHYSICAL EXAM: ICD-10-CM

## 2020-07-23 LAB — AMBIGUOUS DTTM AMBI4: NORMAL

## 2020-07-23 NOTE — PROGRESS NOTES
Lisbeth Hernández is a 25 y.o.  female who presents for her Annual Gynecologic Exam      HPI Comments: Pt presents for her annual well woman exam.   Patient reports history of heavy cycles despite use of birth control pills.    Patient has a history of LGSIL Pap smear in the past which has not been followed up.    Patient's last menstrual period was 2020.    Review of Systems:   Pertinent positives documented in HPI and all other systems reviewed & are negative    PGYN Hx: Menorrhagia    All PMH, PSH, allergies, social history and FH reviewed and updated today:  Past Medical History:   Diagnosis Date   • Iron deficiency anemia due to chronic blood loss 2020       Past Surgical History:   Procedure Laterality Date   • PB REPAIR OF NASAL SEPTUM     • RHINOPLASTY     • SUBMANDIBLE GLAND EXCISION     • TUMOR EXCISION WITH BIOPSY  ;     benign tumors under tongue X3 surgeries       Medications:   Current Outpatient Medications Ordered in Epic   Medication Sig Dispense Refill   • ferrous sulfate 325 (65 Fe) MG tablet Take 1 Tab by mouth 2 Times a Day. 60 Tab 11   • desogestrel-ethinyl estradiol (APRI) 0.15-30 MG-MCG per tablet Take 1 Tab by mouth every day.     • cetirizine (ZYRTEC) 10 MG Tab Take 1 Tab by mouth every day. 30 Tab 11   • famotidine (PEPCID) 20 MG Tab Take 1 Tab by mouth 2 times a day. (Patient not taking: Reported on 2020) 60 Tab 1   • methylPREDNISolone (MEDROL DOSEPAK) 4 MG Tablet Therapy Pack As directed on the packaging label. (Patient not taking: Reported on 2020) 21 Tab 0   • triamcinolone acetonide (KENALOG) 0.1 % Cream Apply 1 Application to affected area(s) 2 times a day. (Patient not taking: Reported on 2020) 1 Tube 0   • fluticasone (FLONASE) 50 MCG/ACT nasal spray Spray 1 Spray in nose every day. (Patient not taking: Reported on 2020) 16 g 0     No current Epic-ordered facility-administered medications on file.        Sulfa  drugs    Social History     Socioeconomic History   • Marital status: Single     Spouse name: Not on file   • Number of children: 1   • Years of education: Not on file   • Highest education level: Not on file   Occupational History   • Not on file   Social Needs   • Financial resource strain: Not on file   • Food insecurity     Worry: Not on file     Inability: Not on file   • Transportation needs     Medical: Not on file     Non-medical: Not on file   Tobacco Use   • Smoking status: Former Smoker     Packs/day: 0.00     Last attempt to quit: 2016     Years since quittin.1   • Smokeless tobacco: Never Used   Substance and Sexual Activity   • Alcohol use: Yes     Comment: occasionally   • Drug use: No   • Sexual activity: Not Currently     Partners: Male     Birth control/protection: Condom, Pill   Lifestyle   • Physical activity     Days per week: Not on file     Minutes per session: Not on file   • Stress: Not on file   Relationships   • Social connections     Talks on phone: Not on file     Gets together: Not on file     Attends [employer] service: Not on file     Active member of club or organization: Not on file     Attends meetings of clubs or organizations: Not on file     Relationship status: Not on file   • Intimate partner violence     Fear of current or ex partner: Not on file     Emotionally abused: Not on file     Physically abused: Not on file     Forced sexual activity: Not on file   Other Topics Concern   • Not on file   Social History Narrative   • Not on file       Family History   Problem Relation Age of Onset   • Psychiatric Illness Mother         bipolar and depression   • Diabetes Paternal Grandmother    • Hyperlipidemia Paternal Grandmother    • Psychiatric Illness Maternal Aunt    • Psychiatric Illness Maternal Uncle           Objective:   Vital measurements:  /67   Wt 90.3 kg (199 lb)   Body mass index is 33.12 kg/m². (Goal BM I>18 <25)    Physical Exam   Nursing note and  vitals reviewed.  Constitutional: She is oriented to person, place, and time. She appears well-developed and well-nourished. No distress.     HEENT:   Head: Normocephalic and atraumatic.   Right Ear: External ear normal.   Left Ear: External ear normal.   Nose: Nose normal.   Eyes: Conjunctivae and EOM are normal. Pupils are equal, round, and reactive to light. No scleral icterus.     Neck: Normal range of motion. Neck supple. No tracheal deviation present. No thyromegaly present. No cervical or supraclavicular lymphadenopathy.    Pulmonary/Chest: Effort normal and breath sounds normal. No respiratory distress. She has no wheezes. She has no rales. She exhibits no tenderness.     Cardiovascular: Regular, rate and rhythm. No edema.    Breast: Symmetrical, normal consistency without masses., No dimpling or skin changes, Normal nipples without discharge, no axillary lymphadenopathy    Abdominal: Soft. Bowel sounds are normal. She exhibits no distension and no mass. No tenderness. She has no rebound and no guarding.     Genitourinary:  Pelvic exam was performed with patient supine.  External genitalia with no abnormal pigmentation, labial fusion, rashes, tenderness, lesions or injury to the labia bilaterally.  BUS normal  Vagina is pink and moist with no lesions, foul discharge, erythema, tenderness or bleeding. No foreign body around the vagina or signs of injury.   Cervix exhibits no motion tenderness, no discharge and no friability, no lesions.   Uterus is 8 cm and not deviated, not enlarged, not fixed and not tender.  Right adnexa displays no mass, no tenderness and no fullness.  Left adnexa displays no mass, no tenderness and no fullness.     Musculoskeletal: Normal range of motion. Non tender. She exhibits no edema and no tenderness.     Lymphadenopathy: She has no cervical or supraclavicular adenopathy.     Neurological: She is alert and oriented to person, place, and time. She exhibits normal muscle tone.      Skin: Skin is warm and dry. No rash noted. She is not diaphoretic. No erythema. No pallor.     Psychiatric: She has a normal mood and affect. Her behavior is normal. Judgment and thought content normal.        Assessment:     1. History of abnormal cervical Pap smear     2. Annual physical exam     3. Menorrhagia with regular cycle           Plan:   Pap and physical exam performed.  Gonorrhea and Chlamydia testing offered and declined by patient  Self breast awareness discussed.  Encouraged exercise and proper diet.  Mammograms starting @ age 40 annually.  See medications and orders placed in encounter report.  Follow up in 1 year for annual exam or sooner as needed    As far as the menorrhagia is concerned, discussed with the patient the use of Mirena IUD as well as Depo-Provera for management of her symptoms.  Pros and cons of both methods were discussed with the patient.  Education materials were given.  She will decide and let us know    Will call patient with results of Pap smear

## 2020-07-25 LAB
C TRACH DNA GENITAL QL NAA+PROBE: NEGATIVE
CYTOLOGY REG CYTOL: NORMAL
N GONORRHOEA DNA GENITAL QL NAA+PROBE: NEGATIVE
SPECIMEN SOURCE: NORMAL

## 2020-08-07 ENCOUNTER — NON-PROVIDER VISIT (OUTPATIENT)
Dept: URGENT CARE | Facility: PHYSICIAN GROUP | Age: 25
End: 2020-08-07

## 2020-08-07 DIAGNOSIS — Z11.1 SCREENING FOR TUBERCULOSIS: Primary | ICD-10-CM

## 2020-08-07 PROCEDURE — 86580 TB INTRADERMAL TEST: CPT | Performed by: NURSE PRACTITIONER

## 2020-08-07 PROCEDURE — 99999 PR NO CHARGE: CPT | Performed by: NURSE PRACTITIONER

## 2020-08-07 NOTE — PROGRESS NOTES
Lisbeth Hernández is a 25 y.o. female here for a non-provider visit for PPD placement -- Step 1 of 1    Reason for PPD:  work requirement    1. TB evaluation questionnaire completed by patient? Yes      -  If any answers marked yes did you contact a provider prior to placing? Not Indicated  2.  Patient notified to return to clinic for reading on: 8/9/2020  3.  PPD Placement documentation completed on TB evaluation questionnaire? Yes  4.  Location of TB evaluation questionnaire filed:

## 2020-08-10 ENCOUNTER — NON-PROVIDER VISIT (OUTPATIENT)
Dept: URGENT CARE | Facility: PHYSICIAN GROUP | Age: 25
End: 2020-08-10

## 2020-08-10 DIAGNOSIS — Z11.1 ENCOUNTER FOR PPD TEST: Primary | ICD-10-CM

## 2020-08-10 PROCEDURE — 86580 TB INTRADERMAL TEST: CPT | Performed by: NURSE PRACTITIONER

## 2020-08-11 NOTE — PROGRESS NOTES
Lisbeth Hernández is a 25 y.o. female here for a non-provider visit for PPD placement -- Step 1 of 1    Reason for PPD:  work requirement    1. TB evaluation questionnaire completed by patient? Yes      -  If any answers marked yes did you contact a provider prior to placing? Not Indicated  2.  Patient notified to return to clinic for reading on: 8/12-8/13  3.  PPD Placement documentation completed on TB evaluation questionnaire? Yes  4.  Location of TB evaluation questionnaire filed: [facility]

## 2020-08-12 ENCOUNTER — NON-PROVIDER VISIT (OUTPATIENT)
Dept: URGENT CARE | Facility: PHYSICIAN GROUP | Age: 25
End: 2020-08-12

## 2020-08-12 LAB — TB WHEAL 3D P 5 TU DIAM: 0 MM

## 2020-08-12 PROCEDURE — 99999 PR NO CHARGE: CPT | Performed by: PHYSICIAN ASSISTANT

## 2020-08-13 NOTE — PROGRESS NOTES
Lisbeth Hernández is a 25 y.o. female here for a non-provider visit for PPD reading -- Step 1 of 1.      1.  Resulted in Epic under enter/edit results? Yes   2.  TB evaluation questionnaire scanned into chart and original given to patient?Yes      3. Was induration greater than 0 mm? No.      Routed to PCP? No

## 2020-09-08 ENCOUNTER — NON-PROVIDER VISIT (OUTPATIENT)
Dept: URGENT CARE | Facility: CLINIC | Age: 25
End: 2020-09-08

## 2020-09-08 DIAGNOSIS — Z02.1 PRE-EMPLOYMENT DRUG SCREENING: ICD-10-CM

## 2020-09-08 LAB
AMP AMPHETAMINE: NORMAL
COC COCAINE: NORMAL
INT CON NEG: NORMAL
INT CON POS: NORMAL
MET METHAMPHETAMINES: NORMAL
OPI OPIATES: NORMAL
PCP PHENCYCLIDINE: NORMAL
POC DRUG COMMENT 753798-OCCUPATIONAL HEALTH: NEGATIVE
THC: NORMAL

## 2020-09-08 PROCEDURE — 80305 DRUG TEST PRSMV DIR OPT OBS: CPT | Performed by: FAMILY MEDICINE

## 2021-04-01 ENCOUNTER — OFFICE VISIT (OUTPATIENT)
Dept: MEDICAL GROUP | Facility: MEDICAL CENTER | Age: 26
End: 2021-04-01
Attending: INTERNAL MEDICINE
Payer: MEDICAID

## 2021-04-01 VITALS
HEART RATE: 84 BPM | DIASTOLIC BLOOD PRESSURE: 78 MMHG | HEIGHT: 65 IN | WEIGHT: 195 LBS | OXYGEN SATURATION: 98 % | RESPIRATION RATE: 16 BRPM | TEMPERATURE: 97.3 F | SYSTOLIC BLOOD PRESSURE: 118 MMHG | BODY MASS INDEX: 32.49 KG/M2

## 2021-04-01 DIAGNOSIS — D50.0 IRON DEFICIENCY ANEMIA DUE TO CHRONIC BLOOD LOSS: ICD-10-CM

## 2021-04-01 DIAGNOSIS — H92.02 LEFT EAR PAIN: ICD-10-CM

## 2021-04-01 DIAGNOSIS — F34.0 CYCLOTHYMIA: ICD-10-CM

## 2021-04-01 DIAGNOSIS — E66.9 OBESITY (BMI 30-39.9): ICD-10-CM

## 2021-04-01 PROBLEM — E66.811 CLASS 1 OBESITY DUE TO EXCESS CALORIES WITHOUT SERIOUS COMORBIDITY WITH BODY MASS INDEX (BMI) OF 33.0 TO 33.9 IN ADULT: Status: RESOLVED | Noted: 2019-06-05 | Resolved: 2021-04-01

## 2021-04-01 PROBLEM — E66.09 CLASS 1 OBESITY DUE TO EXCESS CALORIES WITHOUT SERIOUS COMORBIDITY WITH BODY MASS INDEX (BMI) OF 33.0 TO 33.9 IN ADULT: Status: RESOLVED | Noted: 2019-06-05 | Resolved: 2021-04-01

## 2021-04-01 PROBLEM — Z00.00 ANNUAL PHYSICAL EXAM: Status: RESOLVED | Noted: 2020-07-22 | Resolved: 2021-04-01

## 2021-04-01 PROBLEM — N92.0 MENORRHAGIA WITH REGULAR CYCLE: Status: RESOLVED | Noted: 2020-07-22 | Resolved: 2021-04-01

## 2021-04-01 PROCEDURE — 99204 OFFICE O/P NEW MOD 45 MIN: CPT | Performed by: INTERNAL MEDICINE

## 2021-04-01 PROCEDURE — 99213 OFFICE O/P EST LOW 20 MIN: CPT | Performed by: INTERNAL MEDICINE

## 2021-04-01 RX ORDER — ARIPIPRAZOLE 10 MG/1
10 TABLET ORAL DAILY
COMMUNITY
End: 2021-04-01

## 2021-04-01 RX ORDER — TRAZODONE HYDROCHLORIDE 100 MG/1
100 TABLET ORAL NIGHTLY
COMMUNITY
End: 2021-04-01

## 2021-04-01 RX ORDER — ARIPIPRAZOLE 5 MG/1
5 TABLET ORAL DAILY
COMMUNITY
End: 2023-03-28

## 2021-04-01 RX ORDER — FERROUS SULFATE 325(65) MG
325 TABLET ORAL DAILY
Status: SHIPPED | DISCHARGE
Start: 2021-04-01 | End: 2021-07-13

## 2021-04-01 RX ORDER — ARIPIPRAZOLE 5 MG/1
TABLET ORAL
COMMUNITY
Start: 2021-03-17 | End: 2021-04-01

## 2021-04-01 RX ORDER — TRAZODONE HYDROCHLORIDE 50 MG/1
TABLET ORAL
COMMUNITY
Start: 2021-03-15 | End: 2023-03-28

## 2021-04-01 ASSESSMENT — PATIENT HEALTH QUESTIONNAIRE - PHQ9: CLINICAL INTERPRETATION OF PHQ2 SCORE: 0

## 2021-04-01 ASSESSMENT — FIBROSIS 4 INDEX: FIB4 SCORE: 0.31

## 2021-04-01 NOTE — ASSESSMENT & PLAN NOTE
She currently follows with psychiatry.  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  She is taking 5 mg nightly as well as trazodone 50 mg nightly.  She is also seeing a therapist.

## 2021-04-01 NOTE — PROGRESS NOTES
Lisbeth Hernández is a 25 y.o. female here for ear pain, iron deficiency, est care  HPI:  Previous PCP was Dr. Simmons    Iron deficiency anemia due to chronic blood loss  She reports a history of iron deficiency anemia since she was pregnant several years ago.  States that she has been a little inconsistent with her supplemental iron but for the past month she has been taking 1 tablet daily.  She does have quite heavy menses although it is better with her birth control.  She reports that for the first 3 days she usually goes through 1 tampon every hour.  Before starting birth control her periods lasted several weeks at times.  Now they are about 5 days.  It has been about 2 years since her last blood work.  She denies significant lightheadedness, dizziness, chest pain, shortness of breath.     Left ear pain  She had the upper part of her ear pierced last Saturday.  She reports that starting on Tuesday it got swollen and painful as well as red.  She has noticed a painful lump behind her left ear for the past few days as well.  She denies fevers and chills.  There has been no drainage from the ear.    Cyclothymia  She currently follows with psychiatry.  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  She is taking 5 mg nightly as well as trazodone 50 mg nightly.  She is also seeing a therapist.    Current medicines (including changes today)  Current Outpatient Medications   Medication Sig Dispense Refill   • traZODone (DESYREL) 50 MG Tab TAKE 1/2 TO 1 TABLET BY MOUTH EVERY NIGHT AS NEEDED FOR INSOMNIA     • ARIPiprazole (ABILIFY) 5 MG tablet Take 5 mg by mouth every day.     • ferrous sulfate 325 (65 Fe) MG tablet Take 1 tablet by mouth every day.     • desogestrel-ethinyl estradiol (APRI) 0.15-30 MG-MCG per tablet Take 1 Tab by mouth every day.       No current facility-administered medications for this visit.     She  has a past medical history of Cyclothymia and Iron deficiency anemia  due to chronic blood loss (2020).  She  has a past surgical history that includes pr repair of nasal septum (); tumor excision with biopsy (; ); rhinoplasty (); and submandible gland excision.  Social History     Tobacco Use   • Smoking status: Former Smoker     Packs/day: 0.10     Years: 2.00     Pack years: 0.20     Quit date: 2016     Years since quittin.8   • Smokeless tobacco: Never Used   Substance Use Topics   • Alcohol use: Yes     Comment: 2-3 drinks twice a month   • Drug use: No     Social History     Social History Narrative   • Not on file     Family History   Problem Relation Age of Onset   • Psychiatric Illness Mother         bipolar and depression   • Hyperlipidemia Paternal Grandmother    • Psychiatric Illness Maternal Aunt    • Psychiatric Illness Maternal Uncle    • Diabetes Paternal Aunt    • Hypertension Paternal Aunt    • Hyperlipidemia Paternal Aunt    • Cancer Neg Hx    • Stroke Neg Hx    • Heart Disease Neg Hx          ROS  As above in HPI  All other systems reviewed and are negative     Objective:     Vitals:    21 0734   BP: 118/78   Pulse: 84   Resp: 16   Temp: 36.3 °C (97.3 °F)   SpO2: 98%     Body mass index is 32.45 kg/m².  Physical Exam:    Constitutional: Alert, no distress.  Skin: Warm, dry, good turgor, no rashes in visible areas.  Eye: Equal, round and reactive, conjunctiva clear, lids normal.  ENMT: Lips without lesions, good dentition, oropharynx clear, left auricle is red and a little swollen at site of piercing.  There is a small posterior auricular lymph node that is tender behind the ear as well   Neck: Trachea midline, no masses, no thyromegaly. No cervical or supraclavicular lymphadenopathy.  Respiratory: Unlabored respiratory effort, lungs clear to auscultation, no wheezes, no ronchi.  Cardiovascular: Regular rate and rhythm, no murmurs appreciated, no lower extremity edema.  Abdomen: Soft, non-tender, no masses, no  hepatosplenomegaly.  Psych: Alert and oriented x3, normal affect and mood.        Assessment and Plan:   The following treatment plan was discussed    1. Iron deficiency anemia due to chronic blood loss  We will obtain updated labs as below  -Ferrous sulfate 325 daily  - CBC WITHOUT DIFFERENTIAL; Future  - FERRITIN; Future  - IRON/TOTAL IRON BIND; Future    2. Left ear pain  Her current piercing looks like it is getting a little bit infected.  We discussed topical antibacterial ointments, ibuprofen for pain, ice.  She is going to follow-up with her .  We discussed that the painful lumps behind her ear are normal lymph nodes in the setting of an infection and nothing she needs to do.  If her symptoms do worsen would consider oral antibiotics    3. Obesity (BMI 30-39.9)  - Patient identified as having weight management issue.  Appropriate orders and counseling given.    4. Cyclothymia  She will continue follow-up with her psychiatrist and current medications (Abilify 5 mg nightly, trazodone 50 mg nightly)        Followup: Return in about 1 year (around 4/1/2022), or if symptoms worsen or fail to improve, for annual.

## 2021-04-01 NOTE — ASSESSMENT & PLAN NOTE
She had the upper part of her ear pierced last Saturday.  She reports that starting on Tuesday it got swollen and painful as well as red.  She has noticed a painful lump behind her left ear for the past few days as well.  She denies fevers and chills.  There has been no drainage from the ear.

## 2021-04-01 NOTE — ASSESSMENT & PLAN NOTE
She reports a history of iron deficiency anemia since she was pregnant several years ago.  States that she has been a little inconsistent with her supplemental iron but for the past month she has been taking 1 tablet daily.  She does have quite heavy menses although it is better with her birth control.  She reports that for the first 3 days she usually goes through 1 tampon every hour.  Before starting birth control her periods lasted several weeks at times.  Now they are about 5 days.  It has been about 2 years since her last blood work.  She denies significant lightheadedness, dizziness, chest pain, shortness of breath.

## 2021-06-24 ENCOUNTER — NURSE TRIAGE (OUTPATIENT)
Dept: HEALTH INFORMATION MANAGEMENT | Facility: OTHER | Age: 26
End: 2021-06-24

## 2021-06-24 NOTE — TELEPHONE ENCOUNTER
"  Reason for Disposition  • Injury is still painful or swollen after 2 weeks    Answer Assessment - Initial Assessment Questions  1. MECHANISM: \"How did the injury happen?\" (e.g., twisting injury, direct blow)       Can't recall  2. ONSET: \"When did the injury happen?\" (Minutes or hours ago)       A couple weeks ago  3. LOCATION: \"Where is the injury located?\"       Left ankle  4. APPEARANCE of INJURY: \"What does the injury look like?\"       No bruising   5. WEIGHT-BEARING: \"Can you put weight on that foot?\" \"Can you walk (four steps or more)?\"        yes  6. SIZE: For cuts, bruises, or swelling, ask: \"How large is it?\" (e.g., inches or centimeters;  entire joint)       none  7. PAIN: \"Is there pain?\" If so, ask: \"How bad is the pain?\"    (e.g., Scale 1-10; or mild, moderate, severe)      Mild to severe  8. TETANUS: For any breaks in the skin, ask: \"When was the last tetanus booster?\"      n/a  9. OTHER SYMPTOMS: \"Do you have any other symptoms?\"       no  10. PREGNANCY: \"Is there any chance you are pregnant?\" \"When was your last menstrual period?\"    Protocols used: ANKLE AND FOOT INJURY-A-[place]    "

## 2021-06-24 NOTE — TELEPHONE ENCOUNTER
Regarding: Twisted ankle couple days ago, now has swelling that comes and goes.   ----- Message from Jennifer Reynaga C.N.A. sent at 6/24/2021  1:07 PM PDT -----  Pt calling to make an appt. States she twisted her ankle a few days ago and it is still hurting and swelling comes and goes.

## 2021-07-06 ENCOUNTER — TELEPHONE (OUTPATIENT)
Dept: MEDICAL GROUP | Facility: MEDICAL CENTER | Age: 26
End: 2021-07-06

## 2021-07-13 DIAGNOSIS — D50.0 IRON DEFICIENCY ANEMIA DUE TO CHRONIC BLOOD LOSS: ICD-10-CM

## 2021-07-13 RX ORDER — FERROUS SULFATE 325(65) MG
325 TABLET ORAL DAILY
Qty: 30 TABLET | Refills: 3 | Status: SHIPPED | OUTPATIENT
Start: 2021-07-13 | End: 2023-05-18

## 2021-07-13 NOTE — TELEPHONE ENCOUNTER
Iron refilled.  Please check with patient to see if she has done her labs yet to follow up on iron levels    Eugenia Agudelo M.D.

## 2021-10-04 ENCOUNTER — TELEPHONE (OUTPATIENT)
Dept: HEALTH INFORMATION MANAGEMENT | Facility: OTHER | Age: 26
End: 2021-10-04

## 2021-10-04 NOTE — TELEPHONE ENCOUNTER
1. Caller Name: Lisbeth Hernández                          Call Back Number: [phone]        How would the patient prefer to be contacted with a response: [employer] message    2. SPECIFIC Action To Be Taken: Referral pending, please sign.    3. Diagnosis/Clinical Reason for Request: Per patient: weight gain, hair loss. could be thyroid disease?    4. Specialty & Provider Name/Lab/Imaging Location: Endocrinology    5. Is appointment scheduled for requested order/referral: no    Patient was informed they will receive a return phone call from the office ONLY if there are any questions before processing their request. Advised to call back if they haven't received a call from the referral department in 5 days.

## 2021-10-05 NOTE — TELEPHONE ENCOUNTER
Referral to endocrinology not appropriate at this time.  We can do basic thyroid testing without referring her.  Please ask her to schedule an appointment so we can discuss her symptoms and make sure we order all appropriate testing.    Eugenia Agudelo M.D.

## 2022-03-17 ENCOUNTER — HOSPITAL ENCOUNTER (OUTPATIENT)
Dept: LAB | Facility: MEDICAL CENTER | Age: 27
End: 2022-03-17
Payer: MEDICAID

## 2022-03-17 LAB
ALBUMIN SERPL BCP-MCNC: 4.5 G/DL (ref 3.2–4.9)
ALBUMIN/GLOB SERPL: 1.8 G/DL
ALP SERPL-CCNC: 66 U/L (ref 30–99)
ALT SERPL-CCNC: 48 U/L (ref 2–50)
ANION GAP SERPL CALC-SCNC: 15 MMOL/L (ref 7–16)
AST SERPL-CCNC: 29 U/L (ref 12–45)
BILIRUB SERPL-MCNC: 0.5 MG/DL (ref 0.1–1.5)
BUN SERPL-MCNC: 13 MG/DL (ref 8–22)
CALCIUM SERPL-MCNC: 9.2 MG/DL (ref 8.5–10.5)
CHLORIDE SERPL-SCNC: 106 MMOL/L (ref 96–112)
CHOLEST SERPL-MCNC: 162 MG/DL (ref 100–199)
CO2 SERPL-SCNC: 21 MMOL/L (ref 20–33)
CREAT SERPL-MCNC: 0.6 MG/DL (ref 0.5–1.4)
ERYTHROCYTE [DISTWIDTH] IN BLOOD BY AUTOMATED COUNT: 42.2 FL (ref 35.9–50)
EST. AVERAGE GLUCOSE BLD GHB EST-MCNC: 111 MG/DL
FASTING STATUS PATIENT QL REPORTED: NORMAL
GFR SERPLBLD CREATININE-BSD FMLA CKD-EPI: 126 ML/MIN/1.73 M 2
GLOBULIN SER CALC-MCNC: 2.5 G/DL (ref 1.9–3.5)
GLUCOSE SERPL-MCNC: 89 MG/DL (ref 65–99)
HBA1C MFR BLD: 5.5 % (ref 4–5.6)
HCT VFR BLD AUTO: 44.5 % (ref 37–47)
HCV AB SER QL: NORMAL
HDLC SERPL-MCNC: 53 MG/DL
HGB BLD-MCNC: 14.8 G/DL (ref 12–16)
HIV 1+2 AB+HIV1 P24 AG SERPL QL IA: NORMAL
LDLC SERPL CALC-MCNC: 74 MG/DL
MCH RBC QN AUTO: 29.5 PG (ref 27–33)
MCHC RBC AUTO-ENTMCNC: 33.3 G/DL (ref 33.6–35)
MCV RBC AUTO: 88.6 FL (ref 81.4–97.8)
PLATELET # BLD AUTO: 286 K/UL (ref 164–446)
PMV BLD AUTO: 12.9 FL (ref 9–12.9)
POTASSIUM SERPL-SCNC: 4.4 MMOL/L (ref 3.6–5.5)
PROT SERPL-MCNC: 7 G/DL (ref 6–8.2)
RBC # BLD AUTO: 5.02 M/UL (ref 4.2–5.4)
SODIUM SERPL-SCNC: 142 MMOL/L (ref 135–145)
T PALLIDUM AB SER QL IA: NORMAL
TRIGL SERPL-MCNC: 174 MG/DL (ref 0–149)
TSH SERPL DL<=0.005 MIU/L-ACNC: 2.63 UIU/ML (ref 0.38–5.33)
WBC # BLD AUTO: 6.8 K/UL (ref 4.8–10.8)

## 2022-03-17 PROCEDURE — 80061 LIPID PANEL: CPT

## 2022-03-17 PROCEDURE — 86803 HEPATITIS C AB TEST: CPT

## 2022-03-17 PROCEDURE — 80053 COMPREHEN METABOLIC PANEL: CPT

## 2022-03-17 PROCEDURE — 36415 COLL VENOUS BLD VENIPUNCTURE: CPT

## 2022-03-17 PROCEDURE — 85027 COMPLETE CBC AUTOMATED: CPT

## 2022-03-17 PROCEDURE — 87389 HIV-1 AG W/HIV-1&-2 AB AG IA: CPT

## 2022-03-17 PROCEDURE — 86780 TREPONEMA PALLIDUM: CPT

## 2022-03-17 PROCEDURE — 84443 ASSAY THYROID STIM HORMONE: CPT

## 2022-03-17 PROCEDURE — 83036 HEMOGLOBIN GLYCOSYLATED A1C: CPT

## 2022-05-26 ENCOUNTER — NON-PROVIDER VISIT (OUTPATIENT)
Dept: OCCUPATIONAL MEDICINE | Facility: CLINIC | Age: 27
End: 2022-05-26

## 2022-05-26 DIAGNOSIS — Z11.1 ENCOUNTER FOR PPD TEST: ICD-10-CM

## 2022-05-26 PROCEDURE — 86580 TB INTRADERMAL TEST: CPT | Performed by: NURSE PRACTITIONER

## 2022-05-29 ENCOUNTER — NON-PROVIDER VISIT (OUTPATIENT)
Dept: URGENT CARE | Facility: CLINIC | Age: 27
End: 2022-05-29
Payer: MEDICAID

## 2022-05-29 LAB — TB WHEAL 3D P 5 TU DIAM: NORMAL MM

## 2022-08-05 ENCOUNTER — PHARMACY VISIT (OUTPATIENT)
Dept: PHARMACY | Facility: MEDICAL CENTER | Age: 27
End: 2022-08-05
Payer: COMMERCIAL

## 2022-08-05 PROCEDURE — RXMED WILLOW AMBULATORY MEDICATION CHARGE: Performed by: INTERNAL MEDICINE

## 2022-08-05 RX ORDER — BNT162B2 0.23 MG/2.25ML
INJECTION, SUSPENSION INTRAMUSCULAR
Qty: 0.3 ML | Refills: 0 | Status: SHIPPED | OUTPATIENT
Start: 2022-08-05 | End: 2023-05-18

## 2022-09-20 ENCOUNTER — HOSPITAL ENCOUNTER (OUTPATIENT)
Dept: LAB | Facility: MEDICAL CENTER | Age: 27
End: 2022-09-20
Attending: NURSE PRACTITIONER
Payer: MEDICAID

## 2022-09-20 LAB
FSH SERPL-ACNC: 6.7 MIU/ML
LH SERPL-ACNC: 5.9 IU/L
PROLACTIN SERPL-MCNC: 21.7 NG/ML (ref 2.8–26)
TSH SERPL DL<=0.005 MIU/L-ACNC: 2.67 UIU/ML (ref 0.38–5.33)

## 2022-09-20 PROCEDURE — 84443 ASSAY THYROID STIM HORMONE: CPT

## 2022-09-20 PROCEDURE — 84270 ASSAY OF SEX HORMONE GLOBUL: CPT

## 2022-09-20 PROCEDURE — 84402 ASSAY OF FREE TESTOSTERONE: CPT

## 2022-09-20 PROCEDURE — 82670 ASSAY OF TOTAL ESTRADIOL: CPT

## 2022-09-20 PROCEDURE — 36415 COLL VENOUS BLD VENIPUNCTURE: CPT

## 2022-09-20 PROCEDURE — 83498 ASY HYDROXYPROGESTERONE 17-D: CPT

## 2022-09-20 PROCEDURE — 83520 IMMUNOASSAY QUANT NOS NONAB: CPT

## 2022-09-20 PROCEDURE — 83001 ASSAY OF GONADOTROPIN (FSH): CPT

## 2022-09-20 PROCEDURE — 84403 ASSAY OF TOTAL TESTOSTERONE: CPT

## 2022-09-20 PROCEDURE — 84146 ASSAY OF PROLACTIN: CPT

## 2022-09-20 PROCEDURE — 82627 DEHYDROEPIANDROSTERONE: CPT

## 2022-09-20 PROCEDURE — 83002 ASSAY OF GONADOTROPIN (LH): CPT

## 2022-09-24 LAB
ESTRADIOL SERPL HS-MCNC: 23.4 PG/ML
MIS SERPL-MCNC: 4.5 NG/ML (ref 0.4–16.02)

## 2022-09-30 LAB
SHBG SERPL-SCNC: 120 NMOL/L (ref 25–122)
TESTOST FREE SERPL-MCNC: 2.5 PG/ML (ref 0.8–7.4)
TESTOST SERPL-MCNC: 37 NG/DL (ref 9–55)

## 2022-10-03 LAB — 17OHP SERPL-MCNC: 32.48 NG/DL

## 2022-10-14 LAB — MISCELLANEOUS LAB RESULT MISCLAB: NORMAL

## 2023-05-18 ENCOUNTER — OFFICE VISIT (OUTPATIENT)
Dept: MEDICAL GROUP | Facility: MEDICAL CENTER | Age: 28
End: 2023-05-18
Attending: INTERNAL MEDICINE
Payer: MEDICAID

## 2023-05-18 VITALS
WEIGHT: 180 LBS | HEIGHT: 67 IN | TEMPERATURE: 97.5 F | SYSTOLIC BLOOD PRESSURE: 122 MMHG | BODY MASS INDEX: 28.25 KG/M2 | OXYGEN SATURATION: 98 % | RESPIRATION RATE: 16 BRPM | DIASTOLIC BLOOD PRESSURE: 82 MMHG | HEART RATE: 96 BPM

## 2023-05-18 DIAGNOSIS — M25.50 MULTIPLE JOINT PAIN: ICD-10-CM

## 2023-05-18 DIAGNOSIS — Z30.011 OCP (ORAL CONTRACEPTIVE PILLS) INITIATION: ICD-10-CM

## 2023-05-18 DIAGNOSIS — N93.9 VAGINAL SPOTTING: ICD-10-CM

## 2023-05-18 PROBLEM — H92.02 LEFT EAR PAIN: Status: RESOLVED | Noted: 2021-04-01 | Resolved: 2023-05-18

## 2023-05-18 PROCEDURE — 3074F SYST BP LT 130 MM HG: CPT | Performed by: INTERNAL MEDICINE

## 2023-05-18 PROCEDURE — 99212 OFFICE O/P EST SF 10 MIN: CPT | Performed by: INTERNAL MEDICINE

## 2023-05-18 PROCEDURE — 3079F DIAST BP 80-89 MM HG: CPT | Performed by: INTERNAL MEDICINE

## 2023-05-18 PROCEDURE — 99214 OFFICE O/P EST MOD 30 MIN: CPT | Performed by: INTERNAL MEDICINE

## 2023-05-18 RX ORDER — DESOGESTREL AND ETHINYL ESTRADIOL 0.15-0.03
1 KIT ORAL DAILY
Qty: 28 TABLET | Refills: 11 | Status: SHIPPED | OUTPATIENT
Start: 2023-05-18 | End: 2023-06-12 | Stop reason: SDUPTHER

## 2023-05-18 RX ORDER — MELOXICAM 15 MG/1
15 TABLET ORAL
Qty: 30 TABLET | Refills: 3 | Status: SHIPPED | OUTPATIENT
Start: 2023-05-18 | End: 2023-09-12

## 2023-05-18 ASSESSMENT — FIBROSIS 4 INDEX: FIB4 SCORE: 0.4

## 2023-05-18 NOTE — ASSESSMENT & PLAN NOTE
She reports that beginning in late January or early February of this year she started to have pain in multiple joints.  Over the course of the last few months her knees, hands, feet, fingers, neck, and back have all been affected.  Reports that the pain lasts for 1 to several days before seeming to spontaneously resolve.  Seems to be worse at night and sometimes she will wake up in the middle of the night crying because it is so severe.  Associated joint tends to get swollen when it is painful.  For her knee pain, she went to the [facility] for an initial evaluation.  She was given a prescription for meloxicam and she has been taking this as needed.  She reports that it does relieve the pain.  Sometimes she also takes Tylenol with it.  States that her first finger of the left hand has been very swollen at the PIP joint since her symptoms started and that she has not been able to bend it completely but she denies any injury to the finger.  She has also had a lot of pain in her MCP joints in both hands.  Last year she was also having pain in her feet.  She ended up seeing a podiatrist who thought she might have a Hernandez's neuroma and did a foot injection for her.  She reports that the first injection worked well but the second injection did not help at all.  She continues to get intermittent pain usually between her fourth and fifth metatarsals on both feet.  She denies any associated rashes or skin changes but does complain of increased hair loss and fatigue.  She denies chest pain, shortness of breath.  She reports some dry mouth, denies dry eyes.  Of note, she reports that her grandmother was diagnosed with rheumatoid arthritis.

## 2023-05-18 NOTE — PROGRESS NOTES
Subjective:   Lisbeth Hernández is a 27 y.o. female here today for joint pain, vaginal bleeding    Multiple joint pain  She reports that beginning in late January or early February of this year she started to have pain in multiple joints.  Over the course of the last few months her knees, hands, feet, fingers, neck, and back have all been affected.  Reports that the pain lasts for 1 to several days before seeming to spontaneously resolve.  Seems to be worse at night and sometimes she will wake up in the middle of the night crying because it is so severe.  Associated joint tends to get swollen when it is painful.  For her knee pain, she went to the [facility] for an initial evaluation.  She was given a prescription for meloxicam and she has been taking this as needed.  She reports that it does relieve the pain.  Sometimes she also takes Tylenol with it.  States that her first finger of the left hand has been very swollen at the PIP joint since her symptoms started and that she has not been able to bend it completely but she denies any injury to the finger.  She has also had a lot of pain in her MCP joints in both hands.  Last year she was also having pain in her feet.  She ended up seeing a podiatrist who thought she might have a Hernandez's neuroma and did a foot injection for her.  She reports that the first injection worked well but the second injection did not help at all.  She continues to get intermittent pain usually between her fourth and fifth metatarsals on both feet.  She denies any associated rashes or skin changes but does complain of increased hair loss and fatigue.  She denies chest pain, shortness of breath.  She reports some dry mouth, denies dry eyes.  Of note, she reports that her grandmother was diagnosed with rheumatoid arthritis.    OCP (oral contraceptive pills) initiation  She has been taking OCPs which she has been ordering online.  Requesting that I take over her prescription  today.  She just finished her last menstrual cycle it is set to restart her pills.    Vaginal spotting  She reports recently that she has been spotting in between periods despite taking her OCPs regularly.  She is also been having some bleeding after sexual activity.  She reports intermittent vaginal discharge that is sometimes foul-smelling.  She is due for her Pap smear in July.       Current medicines (including changes today)  Current Outpatient Medications   Medication Sig Dispense Refill    meloxicam (MOBIC) 15 MG tablet Take 1 Tablet by mouth 1 time a day as needed for Severe Pain or Moderate Pain. 30 Tablet 3    desogestrel-ethinyl estradiol (APRI) 0.15-30 MG-MCG per tablet Take 1 Tablet by mouth every day. 28 Tablet 11     No current facility-administered medications for this visit.     She  has a past medical history of Cyclothymia and Iron deficiency anemia due to chronic blood loss (6/18/2020).         Objective:     Vitals:    05/18/23 0921   BP: 122/82   Pulse: 96   Resp: 16   Temp: 36.4 °C (97.5 °F)   SpO2: 98%     Body mass index is 28.19 kg/m².   Physical Exam:  Constitutional: Alert, no distress.  Skin: Warm, dry, good turgor, no rashes in visible areas.  Eye: Equal, round and reactive, conjunctiva clear, lids normal.  ENMT: Lips without lesions, good dentition, oropharynx clear, TM's clear bilaterally  Neck: Trachea midline, no masses, no thyromegaly. No cervical or supraclavicular lymphadenopathy  Respiratory: Unlabored respiratory effort, lungs clear to auscultation, no wheezes, no ronchi.  Cardiovascular: Regular rate and rhythm, no murmurs appreciated, no lower extremity edema  MSK: PIP joint on left index finger is significantly swollen and she is only able to flex at to about 60 degrees.  Otherwise no tenosynovitis but there is some mild tenderness to palpation over MCP joints.  There is full active range of motion and no swelling in bilateral wrists, elbows, or shoulders.  Bilateral knees  without effusion and nontender to palpation.  There is some mild tenderness to palpation between the fourth and fifth metatarsal heads on the right and left feet.  Psych: Alert and oriented x3, normal affect and mood.      Assessment and Plan:   The following treatment plan was discussed    1. Multiple joint pain  With her constellation of symptoms, I am suspicious of an underlying autoimmune arthropathy.  We discussed obtaining basic labs as below as well as a joint survey of her hands given the significant swelling in her index finger.  She will follow-up upon completion of labs and imaging  - TSH WITH REFLEX TO FT4; Future  - RHEUMATOID FACTOR QUANT; Future  - LUDY ANTIBODY WITH REFLEX; Future  - CCP ANTIBODY; Future  - CRP QUANTITIVE (NON-CARDIAC); Future  - Sed Rate; Future  - meloxicam (MOBIC) 15 MG tablet; Take 1 Tablet by mouth 1 time a day as needed for Severe Pain or Moderate Pain.  Dispense: 30 Tablet; Refill: 3  - DX-JOINT SURVEY-HANDS SINGLE VIEW; Future  - URIC ACID; Future    2. OCP (oral contraceptive pills) initiation  - desogestrel-ethinyl estradiol (APRI) 0.15-30 MG-MCG per tablet; Take 1 Tablet by mouth every day.  Dispense: 28 Tablet; Refill: 11    3. Vaginal spotting  She is due for her Pap smear and we discussed importance of updating that in the next few weeks.  Additionally we will perform vaginal pathogen testing/STI testing at that visit in light of her discharge and spotting.        Followup: Return in about 2 weeks (around 6/1/2023) for PAP, lab review.

## 2023-05-18 NOTE — ASSESSMENT & PLAN NOTE
She reports recently that she has been spotting in between periods despite taking her OCPs regularly.  She is also been having some bleeding after sexual activity.  She reports intermittent vaginal discharge that is sometimes foul-smelling.  She is due for her Pap smear in July.

## 2023-05-18 NOTE — ASSESSMENT & PLAN NOTE
She has been taking OCPs which she has been ordering online.  Requesting that I take over her prescription today.  She just finished her last menstrual cycle it is set to restart her pills.

## 2023-05-19 ENCOUNTER — APPOINTMENT (OUTPATIENT)
Dept: RADIOLOGY | Facility: MEDICAL CENTER | Age: 28
End: 2023-05-19
Attending: INTERNAL MEDICINE
Payer: MEDICAID

## 2023-05-19 ENCOUNTER — HOSPITAL ENCOUNTER (OUTPATIENT)
Dept: LAB | Facility: MEDICAL CENTER | Age: 28
End: 2023-05-19
Attending: INTERNAL MEDICINE
Payer: MEDICAID

## 2023-05-19 DIAGNOSIS — M25.50 MULTIPLE JOINT PAIN: ICD-10-CM

## 2023-05-19 LAB
CRP SERPL HS-MCNC: 1.92 MG/DL (ref 0–0.75)
ERYTHROCYTE [SEDIMENTATION RATE] IN BLOOD BY WESTERGREN METHOD: 16 MM/HOUR (ref 0–25)
RHEUMATOID FACT SER IA-ACNC: 100 IU/ML (ref 0–14)
TSH SERPL DL<=0.005 MIU/L-ACNC: 1.3 UIU/ML (ref 0.38–5.33)
URATE SERPL-MCNC: 4.2 MG/DL (ref 1.9–8.2)

## 2023-05-19 PROCEDURE — 86038 ANTINUCLEAR ANTIBODIES: CPT

## 2023-05-19 PROCEDURE — 77077 JOINT SURVEY SINGLE VIEW: CPT

## 2023-05-19 PROCEDURE — 86140 C-REACTIVE PROTEIN: CPT

## 2023-05-19 PROCEDURE — 36415 COLL VENOUS BLD VENIPUNCTURE: CPT

## 2023-05-19 PROCEDURE — 86200 CCP ANTIBODY: CPT

## 2023-05-19 PROCEDURE — 84443 ASSAY THYROID STIM HORMONE: CPT

## 2023-05-19 PROCEDURE — 86431 RHEUMATOID FACTOR QUANT: CPT

## 2023-05-19 PROCEDURE — 85652 RBC SED RATE AUTOMATED: CPT

## 2023-05-19 PROCEDURE — 84550 ASSAY OF BLOOD/URIC ACID: CPT

## 2023-05-20 LAB
CCP IGG SERPL-ACNC: 10 UNITS (ref 0–19)
NUCLEAR IGG SER QL IA: NORMAL

## 2023-05-24 DIAGNOSIS — M05.80 POLYARTHRITIS WITH POSITIVE RHEUMATOID FACTOR (HCC): ICD-10-CM

## 2023-06-01 ENCOUNTER — HOSPITAL ENCOUNTER (OUTPATIENT)
Facility: MEDICAL CENTER | Age: 28
End: 2023-06-01
Attending: INTERNAL MEDICINE
Payer: MEDICAID

## 2023-06-01 ENCOUNTER — OFFICE VISIT (OUTPATIENT)
Dept: MEDICAL GROUP | Facility: MEDICAL CENTER | Age: 28
End: 2023-06-01
Attending: INTERNAL MEDICINE
Payer: MEDICAID

## 2023-06-01 VITALS
TEMPERATURE: 97.3 F | RESPIRATION RATE: 16 BRPM | HEIGHT: 67 IN | BODY MASS INDEX: 30.29 KG/M2 | SYSTOLIC BLOOD PRESSURE: 118 MMHG | HEART RATE: 82 BPM | DIASTOLIC BLOOD PRESSURE: 80 MMHG | WEIGHT: 193 LBS | OXYGEN SATURATION: 100 %

## 2023-06-01 DIAGNOSIS — Z11.3 SCREEN FOR STD (SEXUALLY TRANSMITTED DISEASE): ICD-10-CM

## 2023-06-01 DIAGNOSIS — Z12.4 SCREENING FOR MALIGNANT NEOPLASM OF CERVIX: ICD-10-CM

## 2023-06-01 DIAGNOSIS — M05.79 RHEUMATOID ARTHRITIS INVOLVING MULTIPLE SITES WITH POSITIVE RHEUMATOID FACTOR (HCC): ICD-10-CM

## 2023-06-01 PROCEDURE — 87491 CHLMYD TRACH DNA AMP PROBE: CPT

## 2023-06-01 PROCEDURE — 99213 OFFICE O/P EST LOW 20 MIN: CPT | Performed by: INTERNAL MEDICINE

## 2023-06-01 PROCEDURE — 87591 N.GONORRHOEAE DNA AMP PROB: CPT

## 2023-06-01 PROCEDURE — 3079F DIAST BP 80-89 MM HG: CPT | Performed by: INTERNAL MEDICINE

## 2023-06-01 PROCEDURE — 88175 CYTOPATH C/V AUTO FLUID REDO: CPT

## 2023-06-01 PROCEDURE — 3074F SYST BP LT 130 MM HG: CPT | Performed by: INTERNAL MEDICINE

## 2023-06-01 PROCEDURE — 99214 OFFICE O/P EST MOD 30 MIN: CPT | Mod: 25 | Performed by: INTERNAL MEDICINE

## 2023-06-01 ASSESSMENT — FIBROSIS 4 INDEX: FIB4 SCORE: 0.41

## 2023-06-01 NOTE — ASSESSMENT & PLAN NOTE
She presents today for Pap.  Her last was done in July 2020 and was normal.  She reports intermittent spotting lately as well as some dyspareunia and some vaginal discharge intermittently.  She is sexually active with 1 partner.  States that she last had spotting yesterday.  She is on an OCP and not due to start her menstrual cycle for another 1 week.

## 2023-06-01 NOTE — ASSESSMENT & PLAN NOTE
We reviewed her labs which are most consistent with a new diagnosis of rheumatoid arthritis.  She states that since her last visit, the meloxicam seems to be less effective and she is having more joint pain and stiffness especially in her hands and fingers.  Her wrists are also bothering her more.  She was able to get an appointment with rheumatology in August.  She is hoping to start on treatment before then given her degree of pain.

## 2023-06-01 NOTE — PROGRESS NOTES
Subjective:   Lisbeth Hernández is a 28 y.o. female here today for PAP, lab review    Screening for malignant neoplasm of cervix  She presents today for Pap.  Her last was done in July 2020 and was normal.  She reports intermittent spotting lately as well as some dyspareunia and some vaginal discharge intermittently.  She is sexually active with 1 partner.  States that she last had spotting yesterday.  She is on an OCP and not due to start her menstrual cycle for another 1 week.    Rheumatoid arthritis involving multiple sites with positive rheumatoid factor (HCC)  We reviewed her labs which are most consistent with a new diagnosis of rheumatoid arthritis.  She states that since her last visit, the meloxicam seems to be less effective and she is having more joint pain and stiffness especially in her hands and fingers.  Her wrists are also bothering her more.  She was able to get an appointment with rheumatology in August.  She is hoping to start on treatment before then given her degree of pain.       Current medicines (including changes today)  Current Outpatient Medications   Medication Sig Dispense Refill    meloxicam (MOBIC) 15 MG tablet Take 1 Tablet by mouth 1 time a day as needed for Severe Pain or Moderate Pain. 30 Tablet 3    desogestrel-ethinyl estradiol (APRI) 0.15-30 MG-MCG per tablet Take 1 Tablet by mouth every day. 28 Tablet 11     No current facility-administered medications for this visit.     She  has a past medical history of Cyclothymia and Iron deficiency anemia due to chronic blood loss (6/18/2020).         Objective:     Vitals:    06/01/23 0741   BP: 118/80   Pulse: 82   Resp: 16   Temp: 36.3 °C (97.3 °F)   SpO2: 100%     Body mass index is 30.22 kg/m².   Physical Exam:  Constitutional: Alert, no distress.  Skin: Warm, dry, good turgor, no rashes in visible areas.  Eye: Equal, round and reactive, conjunctiva clear, lids normal.  : external genitalia normal, cervix is mildly erythematous  and there is some spotting after collection of the Pap, mild amount of yellowish discharge present, no cervical motion tenderness  Psych: Alert and oriented x3, normal affect and mood.      Results and Imaging:   Hospital Outpatient Visit on 05/19/2023   Component Date Value Ref Range Status    Uric Acid 05/19/2023 4.2  1.9 - 8.2 mg/dL Final    Sed Rate Westergren 05/19/2023 16  0 - 25 mm/hour Final    Stat C-Reactive Protein 05/19/2023 1.92 (H)  0.00 - 0.75 mg/dL Final    Ccp Antibodies 05/19/2023 10  0 - 19 Units Final    Comment: INTERPRETIVE INFORMATION: Cyclic Citrullinated Peptide Antibody,  IgG  19 Units or less ................... Negative  20-39 Units ........................ Weak Positive  40-59 Units ........................ Moderate Positive  60 Units or greater ................ Strong Positive  Anti-cyclic citrullinated peptide (anti-CCP), IgG antibodies are  present in about 69-83 percent of patients with rheumatoid  arthritis (RA) and have specificities of 93-95 percent. These  autoantibodies may be present in the preclinical phase of disease,  are associated with future RA development, and may predict  radiographic joint destruction. Patients with weak positive  results should be monitored and testing repeated.  Performed By: [facility]  [address] [place] [id number]  : Fred Crockett MD, PhD      TSH 05/19/2023 1.300  0.380 - 5.330 uIU/mL Final    Comment: The 2011 American Thyroid Association (CARLO) guidelines  recommended that the interpretation of thyroid function in  pregnancy be based on trimester specific reference ranges.    1st Trimester  0.100-2.500 mIU/L  2nd Trimester  0.200-3.000 mIU/L  3rd Trimester  0.300-3.500 mIU/L    These established reference ranges have not been validated  at [facility].      Rheumatoid Factor -Neph- 05/19/2023 100 (H)  0 - 14 IU/mL Final    Antinuclear Antibody 05/19/2023 None Detected  None Detected  Final    Comment: If suspicion of connective tissue disease is strong and LUDY EIA is  negative, consider testing for LUDY by IFA ([id number]).    No antibodies to Anti-Nuclear Antibodies (LUDY) detected.  The  Extractable Nuclear Antigen Antibodies (RNP, Murry, SSA 52, SSA  60, Scleroderma, Demetria-1 and SSB) and Double Stranded DNA (dsDNA)  Antibody, IgG will not be performed.  INTERPRETIVE INFORMATION: Anti-Nuclear Antibodies (LUDY), IgG by  FIDEL  Antinuclear Antibodies (LUDY), IgG by FIDEL: LUDY specimens are  screened using enzyme-linked immunosorbent assay (FIDEL)  methodology. All FIDEL results reported as Detected are further  tested by indirect fluorescent assay (IFA) using HEp-2 substrate  with an IgG-specific conjugate. The LUDY FIDEL screen is designed  to detect antibodies against dsDNA, histones, SS-A (Ro), SS-B  (La), Murry, Murry/RNP, Scl-70, Demetria-1, centromeric proteins, other  antigens extracted from the HEp-2 cell nucleus. LUDY FIDEL assays  have been reported to have lower sensitivities than LUDY IFA                            for  systemic autoimmune rheumatic diseases (SARD).  Negative results do not necessarily rule out SARD.  Performed By: [facility]  [address] [place], [place] [id number]  : Fred Crockett MD, PhD       Dx hand (5/19/23)  FINDINGS:  Bone mineralization is normal. There is no evidence of fracture. No evidence of arthropathy. No evidence of erosive change. Soft tissues are normal.     IMPRESSION:     No evidence of fracture or arthropathy    Assessment and Plan:   The following treatment plan was discussed    1. Screening for malignant neoplasm of cervix  - VAGINAL PATHOGENS DNA PANEL; Future    2. Screen for STD (sexually transmitted disease)  - VAGINAL PATHOGENS DNA PANEL; Future  - THINPREP RFLX HPV ASCUS W/CTNG; Future    3. Rheumatoid arthritis involving multiple sites with positive rheumatoid factor (HCC)  She is quite symptomatic at this time.  I have  reached out to rheumatology regarding initiating therapy before she is seen, considering prednisone taper with methotrexate or Plaquenil depending on what they would prefer.  Discussed this with the patient today and she is amenable to starting treatment as recommended.  -Follow-up with rheumatology in August, likely start treatment before that pending rheumatology recommendations        Followup: Return if symptoms worsen or fail to improve.

## 2023-06-02 LAB
C TRACH DNA GENITAL QL NAA+PROBE: POSITIVE
CYTOLOGY REG CYTOL: ABNORMAL
N GONORRHOEA DNA GENITAL QL NAA+PROBE: NEGATIVE
SPECIMEN SOURCE: ABNORMAL

## 2023-06-06 ENCOUNTER — PATIENT MESSAGE (OUTPATIENT)
Dept: MEDICAL GROUP | Facility: MEDICAL CENTER | Age: 28
End: 2023-06-06
Payer: MEDICAID

## 2023-06-06 DIAGNOSIS — A74.9 CHLAMYDIA: ICD-10-CM

## 2023-06-06 RX ORDER — DOXYCYCLINE HYCLATE 100 MG
100 TABLET ORAL 2 TIMES DAILY
Qty: 14 TABLET | Refills: 0 | Status: SHIPPED | OUTPATIENT
Start: 2023-06-06 | End: 2023-06-13

## 2023-06-12 DIAGNOSIS — Z30.011 OCP (ORAL CONTRACEPTIVE PILLS) INITIATION: ICD-10-CM

## 2023-06-12 PROCEDURE — RXMED WILLOW AMBULATORY MEDICATION CHARGE: Performed by: FAMILY MEDICINE

## 2023-06-12 RX ORDER — DESOGESTREL AND ETHINYL ESTRADIOL 0.15-0.03
1 KIT ORAL DAILY
Qty: 28 TABLET | Refills: 4 | Status: SHIPPED | OUTPATIENT
Start: 2023-06-12 | End: 2023-10-26

## 2023-06-23 ENCOUNTER — OFFICE VISIT (OUTPATIENT)
Dept: MEDICAL GROUP | Facility: MEDICAL CENTER | Age: 28
End: 2023-06-23
Attending: FAMILY MEDICINE
Payer: MEDICAID

## 2023-06-23 ENCOUNTER — PHARMACY VISIT (OUTPATIENT)
Dept: PHARMACY | Facility: MEDICAL CENTER | Age: 28
End: 2023-06-23
Payer: COMMERCIAL

## 2023-06-23 ENCOUNTER — HOSPITAL ENCOUNTER (OUTPATIENT)
Facility: MEDICAL CENTER | Age: 28
End: 2023-06-23
Attending: FAMILY MEDICINE
Payer: MEDICAID

## 2023-06-23 ENCOUNTER — HOSPITAL ENCOUNTER (OUTPATIENT)
Dept: LAB | Facility: MEDICAL CENTER | Age: 28
End: 2023-06-23
Attending: FAMILY MEDICINE
Payer: MEDICAID

## 2023-06-23 VITALS
DIASTOLIC BLOOD PRESSURE: 80 MMHG | SYSTOLIC BLOOD PRESSURE: 110 MMHG | BODY MASS INDEX: 30.25 KG/M2 | TEMPERATURE: 97.8 F | HEART RATE: 92 BPM | OXYGEN SATURATION: 95 % | HEIGHT: 67 IN | RESPIRATION RATE: 16 BRPM | WEIGHT: 192.7 LBS

## 2023-06-23 DIAGNOSIS — A74.9 CHLAMYDIA: ICD-10-CM

## 2023-06-23 DIAGNOSIS — M05.79 RHEUMATOID ARTHRITIS INVOLVING MULTIPLE SITES WITH POSITIVE RHEUMATOID FACTOR (HCC): ICD-10-CM

## 2023-06-23 DIAGNOSIS — R10.2 PELVIC PAIN: ICD-10-CM

## 2023-06-23 LAB
AMBIGUOUS DTTM AMBI4: NORMAL
HCG SERPL QL: NEGATIVE

## 2023-06-23 PROCEDURE — 99212 OFFICE O/P EST SF 10 MIN: CPT | Mod: 25 | Performed by: FAMILY MEDICINE

## 2023-06-23 PROCEDURE — 96372 THER/PROPH/DIAG INJ SC/IM: CPT | Performed by: FAMILY MEDICINE

## 2023-06-23 PROCEDURE — 3074F SYST BP LT 130 MM HG: CPT | Performed by: FAMILY MEDICINE

## 2023-06-23 PROCEDURE — 87660 TRICHOMONAS VAGIN DIR PROBE: CPT

## 2023-06-23 PROCEDURE — 700101 HCHG RX REV CODE 250: Mod: UD | Performed by: FAMILY MEDICINE

## 2023-06-23 PROCEDURE — 36415 COLL VENOUS BLD VENIPUNCTURE: CPT

## 2023-06-23 PROCEDURE — 3079F DIAST BP 80-89 MM HG: CPT | Performed by: FAMILY MEDICINE

## 2023-06-23 PROCEDURE — 84703 CHORIONIC GONADOTROPIN ASSAY: CPT

## 2023-06-23 PROCEDURE — 99214 OFFICE O/P EST MOD 30 MIN: CPT | Performed by: FAMILY MEDICINE

## 2023-06-23 PROCEDURE — 87510 GARDNER VAG DNA DIR PROBE: CPT

## 2023-06-23 PROCEDURE — 87480 CANDIDA DNA DIR PROBE: CPT

## 2023-06-23 PROCEDURE — 700111 HCHG RX REV CODE 636 W/ 250 OVERRIDE (IP): Mod: UD | Performed by: FAMILY MEDICINE

## 2023-06-23 RX ORDER — HYDROXYCHLOROQUINE SULFATE 400 MG/1
400 TABLET ORAL DAILY
Qty: 30 TABLET | Refills: 2 | Status: SHIPPED | OUTPATIENT
Start: 2023-06-23 | End: 2023-08-17

## 2023-06-23 RX ORDER — DOXYCYCLINE 100 MG/1
100 CAPSULE ORAL 2 TIMES DAILY
Qty: 14 CAPSULE | Refills: 0 | Status: SHIPPED | OUTPATIENT
Start: 2023-06-23 | End: 2023-06-30

## 2023-06-23 RX ADMIN — LIDOCAINE HYDROCHLORIDE 1 G: 10 INJECTION, SOLUTION EPIDURAL; INFILTRATION; INTRACAUDAL; PERINEURAL at 08:23

## 2023-06-23 ASSESSMENT — FIBROSIS 4 INDEX: FIB4 SCORE: 0.41

## 2023-06-23 NOTE — ASSESSMENT & PLAN NOTE
Pt was advised to return to office for repeat swab  Recently dx with chlamydia on pap smear, also had complaints of lower pelvic pain with spotting. She completed 1 week of doxycycline 1-2 weeks ago. Notes she is still having intermittent spotting and occasional pelvic pain with intercourse. Denies fevers, malaise. Her partner tested negative but was still treated.

## 2023-06-23 NOTE — ASSESSMENT & PLAN NOTE
Patient has a new diagnosis of RA with positive RF.  She notes that she has been having worsening joint pain despite using meloxicam.  She states that she occasionally has lapses in her birth control as it is not available at the pharmacy. She has had unprotected sex recently while not on birth control maybe 2 weeks ago.   She has a rheumatology appointment upcoming in august

## 2023-06-23 NOTE — PROGRESS NOTES
Subjective:     CC:   Chief Complaint   Patient presents with    Pain     Joint pain, wants to get rechecked for std         HPI:     Rheumatoid arthritis involving multiple sites with positive rheumatoid factor (HCC)  Patient has a new diagnosis of RA with positive RF.  She notes that she has been having worsening joint pain despite using meloxicam.  She states that she occasionally has lapses in her birth control as it is not available at the pharmacy. She has had unprotected sex recently while not on birth control maybe 2 weeks ago.   She has a rheumatology appointment upcoming in august    Chlamydia  Pt was advised to return to office for repeat swab  Recently dx with chlamydia on pap smear, also had complaints of lower pelvic pain with spotting. She completed 1 week of doxycycline 1-2 weeks ago. Notes she is still having intermittent spotting and occasional pelvic pain with intercourse. Denies fevers, malaise. Her partner tested negative but was still treated.       Past Medical History:   Diagnosis Date    Cyclothymia     Iron deficiency anemia due to chronic blood loss 2020       Social History     Tobacco Use    Smoking status: Former     Packs/day: 0.10     Years: 2.00     Pack years: 0.20     Types: Cigarettes     Quit date: 2016     Years since quittin.0    Smokeless tobacco: Never   Vaping Use    Vaping Use: Never used   Substance Use Topics    Alcohol use: Yes     Comment: 2-3 drinks twice a month    Drug use: No       Current Outpatient Medications Ordered in Epic   Medication Sig Dispense Refill    hydroxychloroquine 400 MG Tab Take 400 mg by mouth every day. 30 Tablet 2    doxycycline (MONODOX) 100 MG capsule Take 1 Capsule by mouth 2 times a day for 7 days. 14 Capsule 0    desogestrel-ethinyl estradiol (APRI) 0.15-30 MG-MCG per tablet Take 1 Tablet by mouth every day. 28 Tablet 4    meloxicam (MOBIC) 15 MG tablet Take 1 Tablet by mouth 1 time a day as needed for Severe Pain or Moderate  "Pain. 30 Tablet 3     No current Epic-ordered facility-administered medications on file.       Allergies:  Sulfa drugs        Objective:       Exam:  /80 (BP Location: Left arm, Patient Position: Sitting, BP Cuff Size: Adult)   Pulse 92   Temp 36.6 °C (97.8 °F) (Temporal)   Resp 16   Ht 1.702 m (5' 7\")   Wt 87.4 kg (192 lb 11.2 oz)   SpO2 95%   BMI 30.18 kg/m²  Body mass index is 30.18 kg/m².    General: Normal appearing. No distress.  Abdomen: RLQ and periumbilical tenderness  Psych: Normal mood and affect.       Labs:   Reviewed recent pap, pos chlamydia     Assessment & Plan:     28 y.o. female with the following -     1. Chlamydia  - doxycycline (MONODOX) 100 MG capsule; Take 1 Capsule by mouth 2 times a day for 7 days.  Dispense: 14 Capsule; Refill: 0  - cefTRIAXone (Rocephin) 1 g, lidocaine (XYLOCAINE) 1 % 4 mL for IM use  - VAGINAL PATHOGENS DNA PANEL; Future  - Chlamydia/GC, PCR (Urine); Future  We will repeat vag pathogens swab as the last one could not be run  With dx of chlamydia, hx of spotting, pelvic pain, I will go ahead and treat for a mild case of PID. I discussed this with patient and the risks and benefits, and she is agreeable with treatment. 1g ceftriaxone + additional week of doxycycline for complete treatment of PID.   Advised to complete urine test at the lab in about 1 month to retest for STI    2. Pelvic pain  - doxycycline (MONODOX) 100 MG capsule; Take 1 Capsule by mouth 2 times a day for 7 days.  Dispense: 14 Capsule; Refill: 0  - cefTRIAXone (Rocephin) 1 g, lidocaine (XYLOCAINE) 1 % 4 mL for IM use  - VAGINAL PATHOGENS DNA PANEL; Future    3. Rheumatoid arthritis involving multiple sites with positive rheumatoid factor (HCC)  - hydroxychloroquine 400 MG Tab; Take 400 mg by mouth every day.  Dispense: 30 Tablet; Refill: 2  - HCG,QUAL (OUTPATIENT ONLY); Future  Dr. Agudelo had reached out to rheumatologist for recommendations, and will start patient on hydroxychloroquine " 400mg daily. Advised her to complete blood test for pregnancy prior to starting. She will complete this today. Further recs to come from rheumatology.       Return if symptoms worsen or fail to improve.    Please note that this dictation was created using voice recognition software. I have made every reasonable attempt to correct obvious errors, but I expect that there are errors of grammar and possibly content that I did not discover before finalizing the note.

## 2023-06-24 LAB
AMBIGUOUS DTTM AMBI4: NORMAL
CANDIDA DNA VAG QL PROBE+SIG AMP: NEGATIVE
G VAGINALIS DNA VAG QL PROBE+SIG AMP: POSITIVE
SIGNIFICANT IND 70042: NORMAL
SITE SITE: NORMAL
SOURCE SOURCE: NORMAL
T VAGINALIS DNA VAG QL PROBE+SIG AMP: NEGATIVE

## 2023-06-26 DIAGNOSIS — B96.89 BV (BACTERIAL VAGINOSIS): ICD-10-CM

## 2023-06-26 DIAGNOSIS — N76.0 BV (BACTERIAL VAGINOSIS): ICD-10-CM

## 2023-06-26 RX ORDER — METRONIDAZOLE 500 MG/1
500 TABLET ORAL 2 TIMES DAILY
Qty: 14 TABLET | Refills: 0 | Status: SHIPPED | OUTPATIENT
Start: 2023-06-26 | End: 2023-07-03

## 2023-08-01 ENCOUNTER — HOSPITAL ENCOUNTER (EMERGENCY)
Facility: MEDICAL CENTER | Age: 28
End: 2023-08-01
Attending: STUDENT IN AN ORGANIZED HEALTH CARE EDUCATION/TRAINING PROGRAM
Payer: MEDICAID

## 2023-08-01 VITALS
DIASTOLIC BLOOD PRESSURE: 74 MMHG | SYSTOLIC BLOOD PRESSURE: 119 MMHG | HEIGHT: 67 IN | OXYGEN SATURATION: 96 % | TEMPERATURE: 98.2 F | RESPIRATION RATE: 17 BRPM | HEART RATE: 74 BPM | WEIGHT: 188.27 LBS | BODY MASS INDEX: 29.55 KG/M2

## 2023-08-01 DIAGNOSIS — L29.9 ITCHING: ICD-10-CM

## 2023-08-01 DIAGNOSIS — T88.7XXA MEDICATION SIDE EFFECT: ICD-10-CM

## 2023-08-01 LAB
ALBUMIN SERPL BCP-MCNC: 4.1 G/DL (ref 3.2–4.9)
ALBUMIN/GLOB SERPL: 1.1 G/DL
ALP SERPL-CCNC: 85 U/L (ref 30–99)
ALT SERPL-CCNC: 20 U/L (ref 2–50)
ANION GAP SERPL CALC-SCNC: 12 MMOL/L (ref 7–16)
APPEARANCE UR: CLEAR
AST SERPL-CCNC: 19 U/L (ref 12–45)
BACTERIA #/AREA URNS HPF: NEGATIVE /HPF
BASOPHILS # BLD AUTO: 0.9 % (ref 0–1.8)
BASOPHILS # BLD: 0.05 K/UL (ref 0–0.12)
BILIRUB SERPL-MCNC: 0.2 MG/DL (ref 0.1–1.5)
BILIRUB UR QL STRIP.AUTO: NEGATIVE
BUN SERPL-MCNC: 13 MG/DL (ref 8–22)
CALCIUM ALBUM COR SERPL-MCNC: 9.2 MG/DL (ref 8.5–10.5)
CALCIUM SERPL-MCNC: 9.3 MG/DL (ref 8.5–10.5)
CHLORIDE SERPL-SCNC: 104 MMOL/L (ref 96–112)
CO2 SERPL-SCNC: 23 MMOL/L (ref 20–33)
COLOR UR: YELLOW
CREAT SERPL-MCNC: 0.54 MG/DL (ref 0.5–1.4)
EOSINOPHIL # BLD AUTO: 0.15 K/UL (ref 0–0.51)
EOSINOPHIL NFR BLD: 2.7 % (ref 0–6.9)
EPI CELLS #/AREA URNS HPF: NORMAL /HPF
ERYTHROCYTE [DISTWIDTH] IN BLOOD BY AUTOMATED COUNT: 37.4 FL (ref 35.9–50)
GFR SERPLBLD CREATININE-BSD FMLA CKD-EPI: 128 ML/MIN/1.73 M 2
GLOBULIN SER CALC-MCNC: 3.9 G/DL (ref 1.9–3.5)
GLUCOSE SERPL-MCNC: 104 MG/DL (ref 65–99)
GLUCOSE UR STRIP.AUTO-MCNC: NEGATIVE MG/DL
HCG SERPL QL: NEGATIVE
HCT VFR BLD AUTO: 35.1 % (ref 37–47)
HGB BLD-MCNC: 11.3 G/DL (ref 12–16)
HYALINE CASTS #/AREA URNS LPF: NORMAL /LPF
IMM GRANULOCYTES # BLD AUTO: 0.01 K/UL (ref 0–0.11)
IMM GRANULOCYTES NFR BLD AUTO: 0.2 % (ref 0–0.9)
KETONES UR STRIP.AUTO-MCNC: NEGATIVE MG/DL
LEUKOCYTE ESTERASE UR QL STRIP.AUTO: ABNORMAL
LIPASE SERPL-CCNC: 40 U/L (ref 11–82)
LYMPHOCYTES # BLD AUTO: 2.07 K/UL (ref 1–4.8)
LYMPHOCYTES NFR BLD: 37.8 % (ref 22–41)
MCH RBC QN AUTO: 25.5 PG (ref 27–33)
MCHC RBC AUTO-ENTMCNC: 32.2 G/DL (ref 32.2–35.5)
MCV RBC AUTO: 79.2 FL (ref 81.4–97.8)
MICRO URNS: ABNORMAL
MONOCYTES # BLD AUTO: 0.42 K/UL (ref 0–0.85)
MONOCYTES NFR BLD AUTO: 7.7 % (ref 0–13.4)
NEUTROPHILS # BLD AUTO: 2.78 K/UL (ref 1.82–7.42)
NEUTROPHILS NFR BLD: 50.7 % (ref 44–72)
NITRITE UR QL STRIP.AUTO: NEGATIVE
NRBC # BLD AUTO: 0 K/UL
NRBC BLD-RTO: 0 /100 WBC (ref 0–0.2)
PH UR STRIP.AUTO: 6.5 [PH] (ref 5–8)
PLATELET # BLD AUTO: 451 K/UL (ref 164–446)
PMV BLD AUTO: 10.4 FL (ref 9–12.9)
POTASSIUM SERPL-SCNC: 3.9 MMOL/L (ref 3.6–5.5)
PROT SERPL-MCNC: 8 G/DL (ref 6–8.2)
PROT UR QL STRIP: NEGATIVE MG/DL
RBC # BLD AUTO: 4.43 M/UL (ref 4.2–5.4)
RBC # URNS HPF: NORMAL /HPF
RBC UR QL AUTO: NEGATIVE
SODIUM SERPL-SCNC: 139 MMOL/L (ref 135–145)
SP GR UR STRIP.AUTO: 1.02
UROBILINOGEN UR STRIP.AUTO-MCNC: 1 MG/DL
WBC # BLD AUTO: 5.5 K/UL (ref 4.8–10.8)
WBC #/AREA URNS HPF: NORMAL /HPF

## 2023-08-01 PROCEDURE — 80053 COMPREHEN METABOLIC PANEL: CPT

## 2023-08-01 PROCEDURE — 81001 URINALYSIS AUTO W/SCOPE: CPT

## 2023-08-01 PROCEDURE — 36415 COLL VENOUS BLD VENIPUNCTURE: CPT

## 2023-08-01 PROCEDURE — 83690 ASSAY OF LIPASE: CPT

## 2023-08-01 PROCEDURE — 99284 EMERGENCY DEPT VISIT MOD MDM: CPT

## 2023-08-01 PROCEDURE — 84703 CHORIONIC GONADOTROPIN ASSAY: CPT

## 2023-08-01 PROCEDURE — 85025 COMPLETE CBC W/AUTO DIFF WBC: CPT

## 2023-08-01 PROCEDURE — 700102 HCHG RX REV CODE 250 W/ 637 OVERRIDE(OP): Mod: UD | Performed by: STUDENT IN AN ORGANIZED HEALTH CARE EDUCATION/TRAINING PROGRAM

## 2023-08-01 PROCEDURE — A9270 NON-COVERED ITEM OR SERVICE: HCPCS | Mod: UD | Performed by: STUDENT IN AN ORGANIZED HEALTH CARE EDUCATION/TRAINING PROGRAM

## 2023-08-01 RX ORDER — DIPHENHYDRAMINE HCL 25 MG
25 TABLET ORAL ONCE
Status: COMPLETED | OUTPATIENT
Start: 2023-08-01 | End: 2023-08-01

## 2023-08-01 RX ADMIN — DIPHENHYDRAMINE HYDROCHLORIDE 25 MG: 25 TABLET ORAL at 22:41

## 2023-08-01 ASSESSMENT — FIBROSIS 4 INDEX: FIB4 SCORE: 0.41

## 2023-08-02 NOTE — ED NOTES
Pt signed and given d/c instructions. Reviewed negative/clear results from today and doing f/u w/ rheumatologist scheduled in two weeks. Pt denies further questions/concerns. Pt ambulated out of the dept w/ steady gait A&O x4 w/ all belongings

## 2023-08-02 NOTE — ED TRIAGE NOTES
Chief Complaint   Patient presents with    Sent by MD     Patient saw [facility] doctor today and told to come to ER due to possible kidney or liver failure. PAtient reports she has RA and is taking hydroxychloroquine. Patient reports itchy feet, nausua, swollen ankels, vomiting and diarrhea over the last few weeks.     Abdominal Pain     RUQ pain x 2 weeks.

## 2023-08-02 NOTE — ED PROVIDER NOTES
ED Provider Note    CHIEF COMPLAINT  Chief Complaint   Patient presents with    Sent by MD     Patient saw Tele Health doctor today and told to come to ER due to possible kidney or liver failure. PAtient reports she has RA and is taking hydroxychloroquine. Patient reports itchy feet, nausua, swollen ankels, vomiting and diarrhea over the last few weeks.     Abdominal Pain     RUQ pain x 2 weeks.        EXTERNAL RECORDS REVIEWED  Outpatient Notes she has rheumatoid arthritis involving multiple ileal sites with positive rheumatoid factor.    HPI/ROS  LIMITATION TO HISTORY   Select: : None  OUTSIDE HISTORIAN(S):  None    Lisbeth Hernández is a 28 y.o. female who presents with itching of the hands and feet.  Patient says that she has been taking hydroxychloroquine for rheumatoid arthritis for about 1 month.  She says that she has had bad side effects from the medication since starting it but it is mostly nausea.  She said that today she developed intense pruritus of her hands and feet.  She denies any associated rash.  She says it has significantly improved right now without any medications.  Patient says that she typically gets pain in her joints including shoulder, ankles, finger and is not currently having a flare of her rheumatoid arthritis.  She does have her first appointment scheduled with rheumatology on 17 August but has not yet been established.  She has had intermittent right upper quadrant pain for about 2 weeks.  No pain currently.  She denies any association of pain eating and it does not seem to be associated with her nausea or vomiting.  She is also taking meloxicam to try and control her symptoms but was still having persistent joint pain.  Patient had a telehealth appointment prior to arrival and was referred to the ER to evaluate kidney and liver function.    PAST MEDICAL HISTORY   has a past medical history of Cyclothymia and Iron deficiency anemia due to chronic blood loss  "(2020).    SURGICAL HISTORY   has a past surgical history that includes repair of nasal septum (); tumor excision with biopsy (; ); rhinoplasty (); and submandible gland excision.    FAMILY HISTORY  Family History   Problem Relation Age of Onset    Psychiatric Illness Mother         bipolar and depression    Hyperlipidemia Paternal Grandmother     Psychiatric Illness Maternal Aunt     Psychiatric Illness Maternal Uncle     Diabetes Paternal Aunt     Hypertension Paternal Aunt     Hyperlipidemia Paternal Aunt     Cancer Neg Hx     Stroke Neg Hx     Heart Disease Neg Hx        SOCIAL HISTORY  Social History     Tobacco Use    Smoking status: Former     Packs/day: 0.10     Years: 2.00     Pack years: 0.20     Types: Cigarettes     Quit date: 2016     Years since quittin.1    Smokeless tobacco: Never   Vaping Use    Vaping Use: Never used   Substance and Sexual Activity    Alcohol use: Yes     Comment: 2-3 drinks twice a month    Drug use: No    Sexual activity: Not Currently     Partners: Male     Birth control/protection: Condom, Pill       CURRENT MEDICATIONS  Home Medications       Reviewed by Kathryn Khan R.N. (Registered Nurse) on 23 at   Med List Status: Partial     Medication Last Dose Status   desogestrel-ethinyl estradiol (APRI) 0.15-30 MG-MCG per tablet  Active   hydroxychloroquine 400 MG Tab  Active   meloxicam (MOBIC) 15 MG tablet  Active                    ALLERGIES  Allergies   Allergen Reactions    Sulfa Drugs      Family history of allergy, pt has not taken       PHYSICAL EXAM  VITAL SIGNS: /74   Pulse 74   Temp 36.8 °C (98.2 °F) (Temporal)   Resp 17   Ht 1.702 m (5' 7\")   Wt 85.4 kg (188 lb 4.4 oz)   LMP 2023   SpO2 96%   BMI 29.49 kg/m²    Constitutional: Awake and alert . Non toxic  HENT: Normal inspection  Eyes: Normal inspection  Neck: Grossly normal range of motion.  Cardiovascular: Normal heart rate, Normal rhythm.  Symmetric " peripheral pulses.   Thorax & Lungs: No respiratory distress, No wheezing, No rales, No rhonchi, No chest tenderness.   Abdomen: Soft, non-distended, nontender to palpation in all 4 quadrants, no mass  Skin: No evidence of urticarial rash.  Extremities: Warm, well perfused. No clubbing, cyanosis, edema   Neurologic: Grossly normal   Psychiatric: Normal for situation      DIAGNOSTIC STUDIES / PROCEDURES    LABS  Results for orders placed or performed during the hospital encounter of 08/01/23   CBC WITH DIFFERENTIAL   Result Value Ref Range    WBC 5.5 4.8 - 10.8 K/uL    RBC 4.43 4.20 - 5.40 M/uL    Hemoglobin 11.3 (L) 12.0 - 16.0 g/dL    Hematocrit 35.1 (L) 37.0 - 47.0 %    MCV 79.2 (L) 81.4 - 97.8 fL    MCH 25.5 (L) 27.0 - 33.0 pg    MCHC 32.2 32.2 - 35.5 g/dL    RDW 37.4 35.9 - 50.0 fL    Platelet Count 451 (H) 164 - 446 K/uL    MPV 10.4 9.0 - 12.9 fL    Neutrophils-Polys 50.70 44.00 - 72.00 %    Lymphocytes 37.80 22.00 - 41.00 %    Monocytes 7.70 0.00 - 13.40 %    Eosinophils 2.70 0.00 - 6.90 %    Basophils 0.90 0.00 - 1.80 %    Immature Granulocytes 0.20 0.00 - 0.90 %    Nucleated RBC 0.00 0.00 - 0.20 /100 WBC    Neutrophils (Absolute) 2.78 1.82 - 7.42 K/uL    Lymphs (Absolute) 2.07 1.00 - 4.80 K/uL    Monos (Absolute) 0.42 0.00 - 0.85 K/uL    Eos (Absolute) 0.15 0.00 - 0.51 K/uL    Baso (Absolute) 0.05 0.00 - 0.12 K/uL    Immature Granulocytes (abs) 0.01 0.00 - 0.11 K/uL    NRBC (Absolute) 0.00 K/uL   COMP METABOLIC PANEL   Result Value Ref Range    Sodium 139 135 - 145 mmol/L    Potassium 3.9 3.6 - 5.5 mmol/L    Chloride 104 96 - 112 mmol/L    Co2 23 20 - 33 mmol/L    Anion Gap 12.0 7.0 - 16.0    Glucose 104 (H) 65 - 99 mg/dL    Bun 13 8 - 22 mg/dL    Creatinine 0.54 0.50 - 1.40 mg/dL    Calcium 9.3 8.5 - 10.5 mg/dL    Correct Calcium 9.2 8.5 - 10.5 mg/dL    AST(SGOT) 19 12 - 45 U/L    ALT(SGPT) 20 2 - 50 U/L    Alkaline Phosphatase 85 30 - 99 U/L    Total Bilirubin 0.2 0.1 - 1.5 mg/dL    Albumin 4.1 3.2 - 4.9  g/dL    Total Protein 8.0 6.0 - 8.2 g/dL    Globulin 3.9 (H) 1.9 - 3.5 g/dL    A-G Ratio 1.1 g/dL   LIPASE   Result Value Ref Range    Lipase 40 11 - 82 U/L   HCG QUAL SERUM   Result Value Ref Range    Beta-Hcg Qualitative Serum Negative Negative   URINALYSIS,CULTURE IF INDICATED    Specimen: Urine   Result Value Ref Range    Color Yellow     Character Clear     Specific Gravity 1.018 <1.035    Ph 6.5 5.0 - 8.0    Glucose Negative Negative mg/dL    Ketones Negative Negative mg/dL    Protein Negative Negative mg/dL    Bilirubin Negative Negative    Urobilinogen, Urine 1.0 Negative    Nitrite Negative Negative    Leukocyte Esterase Small (A) Negative    Occult Blood Negative Negative    Micro Urine Req Microscopic    URINE MICROSCOPIC (W/UA)   Result Value Ref Range    WBC 0-2 /hpf    RBC 0-2 /hpf    Bacteria Negative None /hpf    Epithelial Cells Few /hpf    Hyaline Cast 0-2 /lpf   ESTIMATED GFR   Result Value Ref Range    GFR (CKD-EPI) 128 >60 mL/min/1.73 m 2       COURSE & MEDICAL DECISION MAKING    ED Observation Status? No; Patient does not meet criteria for ED Observation.     INITIAL ASSESSMENT, COURSE AND PLAN  Care Narrative: This is a 28-year-old female with recently diagnosed rheumatoid arthritis who presents with itching to her hands and feet.  She does not have any urticarial rash, angioedema, GI symptoms, respiratory distress or other signs of anaphylaxis.  Patient was referred here from a telehealth appointment due to concern for liver or renal injury from hydroxychloroquine.  Fortunately her lab work is grossly unremarkable.  She specifically has normal kidney function and normal LFTs.  She id mildly anemic. Patient denies any abdominal pain at this time.  She has a very benign abdominal exam, without tenderness and I doubt acute intraabdominal etiology including appendicitis, small bowel obstruction, intraabdominal infection, diverticulitis, cholecystitis, perforation, ischemia or other acute surgical  process and I do not think imaging is indicated.  Urine without signs of infection.  Patient likely experiencing side effects from medication.  She was given Benadryl in the ER with some improvement.  She says that the hydroxychloroquine is not helping with her rheumatoid arthritis symptoms are told.  I did discuss this with pharmacy who did assure me that it is safe to discontinue this medication without a taper if needed.  I do feel reassured that she has an upcoming appointment with a rheumatologist to discuss further management.  She is not having any signs to suggest an acute flare at this time.  All her questions were answered and she was discharged home in good condition.    DISPOSITION AND DISCUSSIONS  I have discussed management of the patient with the following physicians and CLIVE's:  None    Discussion of management with other [facility] or appropriate source(s): Pharmacy regarding hydroxychloroquine      Escalation of care considered, and ultimately not performed:diagnostic imaging    Barriers to care at this time, including but not limited to:  None .     Decision tools and prescription drugs considered including, but not limited to:  None .    FINAL DIAGNOSIS  1. Itching Acute   2. Medication side effect Acute          Electronically signed by: Yarelis Leonard M.D., 8/1/2023 9:55 PM

## 2023-08-02 NOTE — DISCHARGE INSTRUCTIONS
Please return to the ER if you have fever, increased pain, difficulty breathing or you have any other new or acute concern.

## 2023-08-17 ENCOUNTER — OFFICE VISIT (OUTPATIENT)
Dept: RHEUMATOLOGY | Facility: MEDICAL CENTER | Age: 28
End: 2023-08-17
Attending: INTERNAL MEDICINE
Payer: MEDICAID

## 2023-08-17 VITALS
DIASTOLIC BLOOD PRESSURE: 68 MMHG | BODY MASS INDEX: 29.51 KG/M2 | TEMPERATURE: 98 F | HEIGHT: 67 IN | OXYGEN SATURATION: 97 % | SYSTOLIC BLOOD PRESSURE: 110 MMHG | HEART RATE: 87 BPM | WEIGHT: 188 LBS | RESPIRATION RATE: 12 BRPM

## 2023-08-17 DIAGNOSIS — M05.79 RHEUMATOID ARTHRITIS INVOLVING MULTIPLE SITES WITH POSITIVE RHEUMATOID FACTOR (HCC): ICD-10-CM

## 2023-08-17 PROCEDURE — 3078F DIAST BP <80 MM HG: CPT | Performed by: INTERNAL MEDICINE

## 2023-08-17 PROCEDURE — 99205 OFFICE O/P NEW HI 60 MIN: CPT | Performed by: INTERNAL MEDICINE

## 2023-08-17 PROCEDURE — 99211 OFF/OP EST MAY X REQ PHY/QHP: CPT | Performed by: INTERNAL MEDICINE

## 2023-08-17 PROCEDURE — 3074F SYST BP LT 130 MM HG: CPT | Performed by: INTERNAL MEDICINE

## 2023-08-17 RX ORDER — METHYLPREDNISOLONE 4 MG/1
TABLET ORAL
Qty: 21 TABLET | Refills: 0 | Status: SHIPPED | OUTPATIENT
Start: 2023-08-17 | End: 2023-10-26

## 2023-08-17 ASSESSMENT — JOINT PAIN
TOTAL NUMBER OF SWOLLEN JOINTS: 4
TOTAL NUMBER OF TENDER JOINTS: 12
TOTAL NUMBER OF TENDER JOINTS: 14

## 2023-08-17 ASSESSMENT — FIBROSIS 4 INDEX: FIB4 SCORE: 0.26

## 2023-08-17 NOTE — ASSESSMENT & PLAN NOTE
Patient here for eval for RA, pain in bilateral knees right first, then fingers, shoulders within 1 week,  Patient started on meloxicam, patient on plaquenil 6/2023, only took for one month, with N/V, diahrea, stopped 2 weeks ago because of SE    PMHx  Iron deficient anemia  Rhinoplasty at 16 years old  Hx of saliva glands removed at 16 years old in [place]    Fam Hx   M-bipolar  F-hypercholesterol  Bx3 A/W  Aunt-DGm-RA    Soc Hx  Pos tob quit 2016  Positive vaping  No MJ  ETOH q month  Positive tattoos  No blood tx           5/2023  CCP neg 5/2023  HCV neg 3/2022  Hand x-rays 5/2023-IMPRESSION:  No evidence of fracture or arthropathy.

## 2023-08-17 NOTE — PROGRESS NOTES
Chief Complaint-joint pain    Chief Complaint   Patient presents with    New Patient     Lisbeth Hernández is a 28 y.o. female here as a new Rheumatology Consult referred by Eugenia Agudelo M.D.    This is a new patient evaluation    This is a new patient evaluation, patient referred for evaluation of possible rheumatoid arthritis.  Patient states symptoms started about 6 months ago with pain starting in bilateral knees first and then within a week progressing to involving shoulders fingers ankles etc.  Patient status post evaluation by her PCP who found a positive rheumatoid factor subsequent referred to rheumatology for further evaluation.  Patient had tried NSAIDs i.e. meloxicam without benefit, and then was started on Plaquenil 200 mg p.o. twice daily.  Patient states she had side effects from Plaquenil i.e. nausea and vomiting and diarrhea self stopped Plaquenil 2 weeks ago.    Patient denies any other medical issues, but does relate an past medical history history of saliva glands removed in [place] at 16 years of age for recurrent saliva gland cysts per patient recollection.    Patient also mentions that she is still interested in possibly getting pregnant but should not happen for at least a year, is currently on birth control.      PMHx  Iron deficient anemia  Rhinoplasty at 16 years old  Hx of saliva glands removed at 16 years old in [place]    Fam Hx   M-bipolar  F-hypercholesterol  Bx3 A/W  Aunt-DGm-RA    Soc Hx  Pos tob quit 2016  Positive vaping  No MJ  ETOH q month  Positive tattoos  No blood tx    S/p plaquenil-N/V diarrhea  S/p sulfasalazine-strong family hx of sulfa allergy, patient hesitant     Addendum 8/21/2023  G6PD 17.6 adequate 8/2023  SSA neg 8/2023  SSB neg 8/2023  HBsAg/HBcAb neg 8/2023  HCV neg 8/2023  Quantiferon Gold neg 8/2023     5/2023  CCP neg 5/2023  HCV neg 3/2022  Hand x-rays 5/2023-IMPRESSION:  No evidence of fracture or arthropathy.      Current medicines (including  changes today)  Current Outpatient Medications   Medication Sig Dispense Refill    Certolizumab Pegol 2 X 200 MG Kit 400 mg SQ every 2 weeks 3 Kit 1    methylPREDNISolone (MEDROL) 4 MG Tab 6 tabs po one day then 5 tabs po one day then 4 tabs po one day then 3 tabs po one day then 2 tabs po one day then 1 tab po for one day 21 Tablet 0    meloxicam (MOBIC) 15 MG tablet Take 1 Tablet by mouth 1 time a day as needed for Severe Pain or Moderate Pain. 30 Tablet 3    desogestrel-ethinyl estradiol (APRI) 0.15-30 MG-MCG per tablet Take 1 Tablet by mouth every day. 28 Tablet 4     No current facility-administered medications for this visit.     She  has a past medical history of Cyclothymia and Iron deficiency anemia due to chronic blood loss (2020).  She  has a past surgical history that includes pr repair of nasal septum (); tumor excision with biopsy (; ); rhinoplasty (); and submandible gland excision.  Family History   Problem Relation Age of Onset    Psychiatric Illness Mother         bipolar and depression    Hyperlipidemia Paternal Grandmother     Psychiatric Illness Maternal Aunt     Psychiatric Illness Maternal Uncle     Diabetes Paternal Aunt     Hypertension Paternal Aunt     Hyperlipidemia Paternal Aunt     Cancer Neg Hx     Stroke Neg Hx     Heart Disease Neg Hx      Family Status   Relation Name Status    Mo  Alive    Fa  Alive    PGMo  (Not Specified)    MAunt  (Not Specified)    MUnc  (Not Specified)    PAunt  (Not Specified)    Neg Hx  (Not Specified)     Social History     Tobacco Use    Smoking status: Former     Packs/day: 0.10     Years: 2.00     Additional pack years: 0.00     Total pack years: 0.20     Types: Cigarettes     Quit date: 2016     Years since quittin.2    Smokeless tobacco: Never   Vaping Use    Vaping Use: Never used   Substance Use Topics    Alcohol use: Yes     Comment: 2-3 drinks twice a month    Drug use: No     Social History     Social History  "Narrative    Not on file       ROS   Other than what is mentioned in HPI or physical exam, there is no history of headaches, double vision or blurred vision. No temporal tenderness or jaw claudication.  No trouble swallowing difficulties or sore throats.  No chest complaints including chest pain, cough or sputum production. No GI complaints including nausea, vomiting, change in bowel habits, or past peptic ulcer disease. No history of blood in the stools. No urinary complaints including dysuria or frequency. No history of alopecia, photosensitivity, oral ulcerations, Raynaud's phenomena.       Objective:     /68   Pulse 87   Temp 36.7 °C (98 °F) (Temporal)   Resp 12   Ht 1.702 m (5' 7\")   Wt 85.3 kg (188 lb)   SpO2 97%  Body mass index is 29.44 kg/m².  Physical Exam:    Constitutional: Alert and oriented X3, no distress.Skin: Warm, dry, good turgor, no rashes in visible areas, multiple tattoos.  Eye: Equal, round and reactive, conjunctiva clear, lids normal EOM intactENMT: Lips without lesions, good dentition, no oropharyngeal ulcers, moist buccal mucosa, pinna without deformityNeck: Trachea midline, no masses, no thyromegaly.Lymph:  No cervical lymphadenopathy, no axillary lymphadenopathy, no supraclavicular lymphadenopathyRespiratory: Unlabored respiratory effort, lungs clear to auscultation, no wheezes, no ronchi.Cardiovascular: Normal S1, S2,Abdomen: Soft, non-tender, no masses, no hepatosplenomegaly, overweight.Psych: Alert and oriented x3, normal affect and mood.Neuro Cranial nerves 2-12 are grossly intact, no loss of sensation LEExt:no joint laxity noted in bilateral arms, no joint laxity noted in bilateral legs, no swan-neck or boutonniere deformities, shoulders full range of motion but positive painful arc, no flexion contractures in the elbows, multiple tender joints as well as swollen joints as noted below in the homunculus.      Lab Results   Component Value Date/Time    HEPBSAG Negative " 10/19/2016 04:12 PM     Lab Results   Component Value Date/Time    HEPCAB Non-Reactive 03/17/2022 07:35 AM     Lab Results   Component Value Date/Time    SODIUM 139 08/01/2023 08:19 PM    POTASSIUM 3.9 08/01/2023 08:19 PM    CHLORIDE 104 08/01/2023 08:19 PM    CO2 23 08/01/2023 08:19 PM    GLUCOSE 104 (H) 08/01/2023 08:19 PM    BUN 13 08/01/2023 08:19 PM    CREATININE 0.54 08/01/2023 08:19 PM      Lab Results   Component Value Date/Time    WBC 5.5 08/01/2023 08:19 PM    RBC 4.43 08/01/2023 08:19 PM    HEMOGLOBIN 11.3 (L) 08/01/2023 08:19 PM    HEMATOCRIT 35.1 (L) 08/01/2023 08:19 PM    MCV 79.2 (L) 08/01/2023 08:19 PM    MCH 25.5 (L) 08/01/2023 08:19 PM    MCHC 32.2 08/01/2023 08:19 PM    MPV 10.4 08/01/2023 08:19 PM    NEUTSPOLYS 50.70 08/01/2023 08:19 PM    LYMPHOCYTES 37.80 08/01/2023 08:19 PM    MONOCYTES 7.70 08/01/2023 08:19 PM    EOSINOPHILS 2.70 08/01/2023 08:19 PM    BASOPHILS 0.90 08/01/2023 08:19 PM      Lab Results   Component Value Date/Time    CALCIUM 9.3 08/01/2023 08:19 PM    ASTSGOT 19 08/01/2023 08:19 PM    ALTSGPT 20 08/01/2023 08:19 PM    ALKPHOSPHAT 85 08/01/2023 08:19 PM    TBILIRUBIN 0.2 08/01/2023 08:19 PM    ALBUMIN 4.1 08/01/2023 08:19 PM    TOTPROTEIN 8.0 08/01/2023 08:19 PM     Lab Results   Component Value Date/Time    URICACID 4.2 05/19/2023 07:19 AM    RHEUMFACTN 100 (H) 05/19/2023 07:19 AM    CCPANTIBODY 10 05/19/2023 07:19 AM    ANTINUCAB None Detected 05/19/2023 07:19 AM     Lab Results   Component Value Date/Time    SEDRATEWES 16 05/19/2023 07:19 AM    CREACTPROT 1.92 (H) 05/19/2023 07:19 AM     Lab Results   Component Value Date/Time    COLORURINE Yellow 08/01/2023 08:25 PM    SPECGRAVITY 1.018 08/01/2023 08:25 PM    PHURINE 6.5 08/01/2023 08:25 PM    GLUCOSEUR Negative 08/01/2023 08:25 PM    KETONES Negative 08/01/2023 08:25 PM    PROTEINURIN Negative 08/01/2023 08:25 PM     Lab Results   Component Value Date/Time    HBA1C 5.5 03/17/2022 07:35 AM     Lab Results   Component Value  "Date/Time    CARYN [id number] 05/19/2023 07:19 AM   No results found for: \"PTHINTACT\"  Results for orders placed in visit on 05/19/23    DX-JOINT SURVEY-HANDS SINGLE VIEW    Impression  No evidence of fracture or arthropathy.    Results for orders placed in visit on 03/29/23    DX-KNEE COMPLETE 4+ LEFT    Assessment and Plan:  1. Rheumatoid arthritis involving multiple sites with positive rheumatoid factor (HCC)  Long discussion with patient regarding treatment options, already status post trial of Plaquenil which caused side effects, patient is hesitant about possibly getting pregnant i.e. would like to avoid terotogenic medications such as methotrexate and leflunomide, patient also states strong family history of sulfa allergy, at this point we discussed biologic medications, in light of patient's possible pregnancy issues we will start Cimzia 400 mg subcu every 2 weeks for 3 doses then 200 mg subcu every 2 weeks thereafter.  Information on Cimzia printed out from up-to-date and given to patient to review today, we will also do a Medrol Dosepak as a bridging medication while waiting for authorization of Cimzia.  Today also we will do screening labs hepatitis B hepatitis C and QuantiFERON gold.  While on Cimzia we will need to have monitoring labs done every 6 months, will order those at next visit.    In light of the salivary gland removal, and recurrent cysts on the glands we will check SSA and SSB for evaluation of possibly accompanying Sjogren's syndrome with patient's rheumatoid arthritis.  - Certolizumab Pegol 2 X 200 MG Kit; 400 mg SQ every 2 weeks  Dispense: 3 Kit; Refill: 1  - HEP B CORE AB IGM  - HEP B SURFACE ANTIGEN; Future  - HEP C VIRUS ANTIBODY; Future  - Quantiferon Gold Plus TB (4 tube); Future  - SSA 52 AND 60 (RO)(MAYNOR) AB, IGG; Future  - SSB(LA)(MAYNOR)IGG AB; Future  - G6PD QUANT + RBC; Future  - methylPREDNISolone (MEDROL) 4 MG Tab; 6 tabs po one day then 5 tabs po one day then 4 tabs po one day " then 3 tabs po one day then 2 tabs po one day then 1 tab po for one day  Dispense: 21 Tablet; Refill: 0    2. Medication monitoring  As above    3. Salivary gland removal  As above    4. Iron deficient anemia  History of, we will continue to monitor    Records requested.  Followup: Return in about 3 months (around 11/17/2023). or sooner prn  Patient was seen 60 minutes face-to-face (excluding time for procedures)  of which more than 50% the time was spent counseling the patient regarding  rheumatological conditions and care. Therapy was discussed in detail.  Thank you for this referral.    Please note that this dictation was created using voice recognition software. I have made every reasonable attempt to correct obvious errors, but I expect that there are errors of grammar and possibly content that I did not discover before finalizing the note.

## 2023-08-18 ENCOUNTER — HOSPITAL ENCOUNTER (OUTPATIENT)
Dept: LAB | Facility: MEDICAL CENTER | Age: 28
End: 2023-08-18
Attending: INTERNAL MEDICINE
Payer: MEDICAID

## 2023-08-18 DIAGNOSIS — M05.79 RHEUMATOID ARTHRITIS INVOLVING MULTIPLE SITES WITH POSITIVE RHEUMATOID FACTOR (HCC): ICD-10-CM

## 2023-08-18 LAB
HBV CORE IGM SER QL: NORMAL
HBV SURFACE AG SER QL: NORMAL
HCV AB SER QL: NORMAL

## 2023-08-18 PROCEDURE — 82955 ASSAY OF G6PD ENZYME: CPT

## 2023-08-18 PROCEDURE — 86803 HEPATITIS C AB TEST: CPT

## 2023-08-18 PROCEDURE — 36415 COLL VENOUS BLD VENIPUNCTURE: CPT

## 2023-08-18 PROCEDURE — 87340 HEPATITIS B SURFACE AG IA: CPT

## 2023-08-18 PROCEDURE — 86705 HEP B CORE ANTIBODY IGM: CPT

## 2023-08-18 PROCEDURE — 86480 TB TEST CELL IMMUN MEASURE: CPT

## 2023-08-18 PROCEDURE — 86235 NUCLEAR ANTIGEN ANTIBODY: CPT

## 2023-08-20 LAB
ENA SS-B IGG SER IA-ACNC: 1 AU/ML (ref 0–40)
G6PD RBC-CCNC: 17.6 U/G HB (ref 9.9–16.6)
SSA52 R0ENA AB IGG Q0420: 5 AU/ML (ref 0–40)
SSA60 R0ENA AB IGG Q0419: 1 AU/ML (ref 0–40)

## 2023-08-21 ENCOUNTER — TELEPHONE (OUTPATIENT)
Dept: RHEUMATOLOGY | Facility: MEDICAL CENTER | Age: 28
End: 2023-08-21
Payer: MEDICAID

## 2023-08-21 LAB
GAMMA INTERFERON BACKGROUND BLD IA-ACNC: 0.04 IU/ML
M TB IFN-G BLD-IMP: NEGATIVE
M TB IFN-G CD4+ BCKGRND COR BLD-ACNC: 0.02 IU/ML
MITOGEN IGNF BCKGRD COR BLD-ACNC: >10 IU/ML
QFT TB2 - NIL TBQ2: 0.02 IU/ML

## 2023-08-21 NOTE — TELEPHONE ENCOUNTER
Received request for Cimzia, ran a test claim and a PA is required.    Submitted PA via CoverMyMeds key is [id number]

## 2023-08-22 ENCOUNTER — TELEPHONE (OUTPATIENT)
Dept: RHEUMATOLOGY | Facility: MEDICAL CENTER | Age: 28
End: 2023-08-22
Payer: MEDICAID

## 2023-08-22 NOTE — TELEPHONE ENCOUNTER
Attempted to contact patient at [phone] to discuss [facility] and services/benefits offered. No answer, left voicemail.    Rachel Everett  Rx Coordinator   [phone]

## 2023-08-24 DIAGNOSIS — Z51.81 MEDICATION MONITORING ENCOUNTER: ICD-10-CM

## 2023-08-24 DIAGNOSIS — M05.79 RHEUMATOID ARTHRITIS INVOLVING MULTIPLE SITES WITH POSITIVE RHEUMATOID FACTOR (HCC): ICD-10-CM

## 2023-08-24 PROCEDURE — RXMED WILLOW AMBULATORY MEDICATION CHARGE: Performed by: INTERNAL MEDICINE

## 2023-08-24 RX ORDER — CERTOLIZUMAB PEGOL 200 MG/ML
INJECTION, SOLUTION SUBCUTANEOUS
Qty: 3 EACH | Refills: 0 | Status: SHIPPED | OUTPATIENT
Start: 2023-08-24 | End: 2023-09-18 | Stop reason: SDUPTHER

## 2023-08-28 ENCOUNTER — PHARMACY VISIT (OUTPATIENT)
Dept: PHARMACY | Facility: MEDICAL CENTER | Age: 28
End: 2023-08-28
Payer: COMMERCIAL

## 2023-08-29 ENCOUNTER — DOCUMENTATION (OUTPATIENT)
Dept: PHARMACY | Facility: MEDICAL CENTER | Age: 28
End: 2023-08-29
Payer: MEDICAID

## 2023-08-30 NOTE — PROGRESS NOTES
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 Patient deferred injection technique as she has her first dose administration in the office on 8/31    Proper Handling/Disposal: Sharps container for disposal     Storage/Excursion: store in fridge; good at RT for up to 7 days. Do not place it back in fridge once it has reached RT    Duration of therapy:  until progression, toxicity, or no longer clinically beneficial     Adherence & Potential Barriers: recommended calendar or alarm on phone    Missed dose mgmt: Inject ASAP and adjust next dose 2 weeks from new dates. Do not double the dose     ? Drug Interruptions/Hold (Infection, Abx Use):  hold for infection or surgery, restart to be determined by MD to ensure full recovery from surgery/illness.      List Common, Serious SE & Mitigation Reviewed: Nausea - gingerale, saltiness crackers or having toast, jayesh candy or soda if it doesn’t help then contact MD;  URTI - report to MD if you have s/sx of UTRI   List Precautions Reviewed:    Antibody development: reivewed importance of adherence    Infection risk: discussed hygiene precaution including washing hands, avoiding coming in contact with sick people and wear mask when she is out    Tb reactivation: neg at baseline   List Current SE Reviewed (if applicable): n/a - new to med     Mitigation/mgmt:     S/Sx:  arthritis everywhere in toes, finger, neck, knees, back and gets random flares up. Hard to get out of bed in the morning.   # of Flares: 1 flare up a month; not mild but it went away without MD intervention  Pain Scale: 4-5/10; flare up it can 10/10   Rapid 3: 10.8 (mod activity) - pt did have steroid course last week so said it's not the most accurate  measure of her rapid 3 score but told her average.     Clinically Relevant, Abnormal Labs: 8/01/23   Chem7: Glu: 104N; patti: 3.9H    CBC: hgb: 11.3L; hct: 35.1L; mcv: 79.2L; mch: 25.5L; plt: 451H    TB: Negative (8/18/23)   RF: 100: 05/2023     Wellness/Lifestyle Counseling: Patient works in childcare and walks all the time. Rated her QoL really well other than arthritis pain     Immunizations: dicussed that flu shot, covid booster is safe to receive and recommended.     Med Rec/Updated drug list:    - EMR inaccurate, medication changes reviewed with patient/caregiver: d/c mobic, Medrol tab completed; (+Tylenol PRN); hydroxyzine prn for itchiness   DI Check:  None relevant   Common DI & Dietary Avoidance:  None     Goals of Therapy:    - Decrease disease activity (RAPID-3)    - Prevent and reduce flares and inflammation   - Alleviate pain    - Decrease symptoms associated with RA    - Maintain function of Activities of Daily Living (ADLs)    - Patient has agreed/understands to goals of therapy during education/counseling  Additional: Spoke with Lisbeth who received Cimzia from us and kept it in the fridge. She will be injecting her first dose in the office on 8/31 therefore she deferred administration review. Her arthritis pain has been all over her body and recently finished medrol steroid pack. She also asked if she could receive auto-injector formulation instead of syringe; I told her that I'll reach out to liaison and see if that's possible per your insurance. She thanked us for the call with no further questions.    - norma, PharmD

## 2023-08-31 ENCOUNTER — NON-PROVIDER VISIT (OUTPATIENT)
Dept: RHEUMATOLOGY | Facility: MEDICAL CENTER | Age: 28
End: 2023-08-31
Attending: INTERNAL MEDICINE
Payer: MEDICAID

## 2023-08-31 VITALS
WEIGHT: 192 LBS | TEMPERATURE: 97.8 F | RESPIRATION RATE: 14 BRPM | BODY MASS INDEX: 30.07 KG/M2 | DIASTOLIC BLOOD PRESSURE: 68 MMHG | HEART RATE: 80 BPM | OXYGEN SATURATION: 97 % | SYSTOLIC BLOOD PRESSURE: 120 MMHG

## 2023-08-31 ASSESSMENT — FIBROSIS 4 INDEX: FIB4 SCORE: 0.26

## 2023-08-31 NOTE — NON-PROVIDER
Lisbeth Hrenández is a 28 y.o. female here for a non-provider visit for CIMZIA TEACHING injection.    Reason for injection: RHEUMATOID ARTHRITIS  Order in MAR?: Yes  Patient supplied?:Yes  Minimum interval has been met for this injection (per MAR order): Yes    Patient tolerated injection and no adverse effects were observed or reported: Yes    # of Administrations remaining in MAR: 0      PT WAS BROUGHT IN TODAY FOR A CIMZIA INJECTION TEACHING. RISKS AND POSSIBLE SIDE EFFECTS WERE GONE OVER AND SHE WAS TAUGHT HOW TO INJECT HERSELF IN THE ABDOMINAL TISSUE.    SHE WAITED 20 MIN WITH NO REACTION    Lot#  [id number]  Exp:  05/24    NDC# 43416-600-14

## 2023-08-31 NOTE — PROGRESS NOTES
Subjective     Date of Consultation:8/31/2023 11:30 AM  Primary care physician:Eugenia Agudelo M.D.  Radiation oncology:{[facility] RAD ONC PROVIDER:[id number]}  Surgery:{[facility] SURGERY ONC PROVIDER:[id number]}    Reason for Consultation:  Ms. Polo Hernández  is a pleasant 28 y.o. year old female who presented with ***    History of presenting illness:  She is here for a ***    Treatment History:***    Past Medical History:   Diagnosis Date    Cyclothymia     Iron deficiency anemia due to chronic blood loss 6/18/2020     Past Surgical History:   Procedure Laterality Date    ME REPAIR OF NASAL SEPTUM  2010    RHINOPLASTY  2009    SUBMANDIBLE GLAND EXCISION      TUMOR EXCISION WITH BIOPSY  2007; 2009    benign tumors under tongue X3 surgeries     Allergies   Allergen Reactions    Plaquenil [Hydroxychloroquine] Itching    Sulfa Drugs      Family history of allergy, pt has not taken     Outpatient Encounter Medications as of 8/31/2023   Medication Sig Dispense Refill    certolizumab pegol (CIMZIA STARTER KIT) 6 X 200 MG/ML Prefilled Syringe Kit Inject 400 mg sq every 2 weeks for 3 doses then 200 mg sq every 2 weeks thereafter 3 Each 0    methylPREDNISolone (MEDROL) 4 MG Tab 6 tabs po one day then 5 tabs po one day then 4 tabs po one day then 3 tabs po one day then 2 tabs po one day then 1 tab po for one day 21 Tablet 0    desogestrel-ethinyl estradiol (APRI) 0.15-30 MG-MCG per tablet Take 1 Tablet by mouth every day. 28 Tablet 4    meloxicam (MOBIC) 15 MG tablet Take 1 Tablet by mouth 1 time a day as needed for Severe Pain or Moderate Pain. 30 Tablet 3     No facility-administered encounter medications on file as of 8/31/2023.     @[facility]@  Social History     Socioeconomic History    Marital status: Single     Spouse name: Not on file    Number of children: 1    Years of education: Not on file    Highest education level: Associate degree: occupational, technical, or vocational program   Occupational History    Not on file    Tobacco Use    Smoking status: Former     Current packs/day: 0.00     Average packs/day: 0.1 packs/day for 2.0 years (0.2 ttl pk-yrs)     Types: Cigarettes     Start date: 2014     Quit date: 2016     Years since quittin.2    Smokeless tobacco: Never   Vaping Use    Vaping Use: Never used   Substance and Sexual Activity    Alcohol use: Yes     Comment: 2-3 drinks twice a month    Drug use: No    Sexual activity: Not Currently     Partners: Male     Birth control/protection: Condom, Pill   Other Topics Concern    Not on file   Social History Narrative    Not on file     Social Determinants of Health     Financial Resource Strain: Low Risk  (3/28/2023)    Overall Financial Resource Strain (CARDIA)     Difficulty of Paying Living Expenses: Not very hard   Food Insecurity: No Food Insecurity (3/28/2023)    Hunger Vital Sign     Worried About Running Out of Food in the Last Year: Never true     Ran Out of Food in the Last Year: Never true   Transportation Needs: No Transportation Needs (3/28/2023)    PRAPARE - Transportation     Lack of Transportation (Medical): No     Lack of Transportation (Non-Medical): No   Physical Activity: Inactive (3/28/2023)    Exercise Vital Sign     Days of Exercise per Week: 3 days     Minutes of Exercise per Session: 0 min   Stress: Stress Concern Present (3/28/2023)    [employer] - Occupational Stress Questionnaire     Feeling of Stress : To some extent   Social Connections: Socially Isolated (3/28/2023)    Social Connection and Isolation Panel [NHANES]     Frequency of Communication with Friends and Family: Once a week     Frequency of Social Gatherings with Friends and Family: Once a week     Attends Buddhism Services: Never     Active Member of Clubs or Organizations: No     Attends Club or Organization Meetings: Never     Marital Status: Living with partner   Intimate Partner Violence: Not on file   Housing Stability: Low Risk  (3/28/2023)     Housing Stability Vital Sign     Unable to Pay for Housing in the Last Year: No     Number of Places Lived in the Last Year: 1     Unstable Housing in the Last Year: No      Social History     Tobacco Use   Smoking Status Former    Current packs/day: 0.00    Average packs/day: 0.1 packs/day for 2.0 years (0.2 ttl pk-yrs)    Types: Cigarettes    Start date: 2014    Quit date: 2016    Years since quittin.2   Smokeless Tobacco Never     Social History     Substance and Sexual Activity   Alcohol Use Yes    Comment: 2-3 drinks twice a month     Social History     Substance and Sexual Activity   Drug Use No      OB History    Para Term  AB Living   1 1 1     1   SAB IAB Ectopic Molar Multiple Live Births             1      # Outcome Date GA Lbr Bradley/2nd Weight Sex Delivery Anes PTL Lv   1 Term 03/10/17 39w2d  3.489 kg (7 lb 11.1 oz) M Vag-Spont  N DAGMAR      Patient's last menstrual period was 2023.    G P A L     Family History   Problem Relation Age of Onset    Psychiatric Illness Mother         bipolar and depression    Hyperlipidemia Paternal Grandmother     Psychiatric Illness Maternal Aunt     Psychiatric Illness Maternal Uncle     Diabetes Paternal Aunt     Hypertension Paternal Aunt     Hyperlipidemia Paternal Aunt     Cancer Neg Hx     Stroke Neg Hx     Heart Disease Neg Hx        HPI    ROS         Objective     /68   Pulse 80   Temp 36.6 °C (97.8 °F) (Temporal)   Resp 14   Wt 87.1 kg (192 lb)   LMP 2023   SpO2 97%   BMI 30.07 kg/m²     Physical Exam         Assessment     No diagnosis found.  Labs:  {*** HELP TEXT ***    This SmartLink requires parameters for processing. Parameters allow for more information to be given to the SmartLink. This allows you to request specific information by giving the SmartLink precise direction.    The SmartLink accepts a list of result component base names  by commas. You can also request the number of results to  display for each component. To indicate the number of results for each component, type the component name followed by a colon and then the number of results (Name:4). For all results for that particular component, type a star in place of the number (Name:*). To obtain the first and last results for a particular component, type FL in place of the number or the star (Name:FL). To obtain the last result for a particular component, type the component name without a colon, number, or star.    Example: .[id number][MCH:3,MCV:*,HCT    Your results may be limited by:    Lookback limit - results older than 7200 hours will be excluded.  }   Imaging:  ***  Pathology:  ***           Medical Decision Making: Today's Assessment / Status / Plan:      ***  I have spent greater than 50% of this *** minute visit in counseling/coordination of care with the patient regarding ***.    She agreed and verbalized her agreement and understanding with the current plan. I answered all questions and concerns she has at this time and advised her to call at any time in there interim with questions or concerns in regards to her care.    Thank you for allowing me to participate in her care, I will continue to follow closely.

## 2023-09-11 ENCOUNTER — DOCUMENTATION (OUTPATIENT)
Dept: PHARMACY | Facility: MEDICAL CENTER | Age: 28
End: 2023-09-11
Payer: MEDICAID

## 2023-09-12 DIAGNOSIS — M25.50 MULTIPLE JOINT PAIN: ICD-10-CM

## 2023-09-12 RX ORDER — MELOXICAM 15 MG/1
15 TABLET ORAL
Qty: 30 TABLET | Refills: 3 | Status: SHIPPED | OUTPATIENT
Start: 2023-09-12 | End: 2024-02-22

## 2023-09-12 NOTE — PROGRESS NOTES
PHARMACIST FOLLOW UP - First Cycle    Dx: Rheumatoid arthritis involving multiple sites with positive rheumatoid factor [M05.79]     Txt review: Cimzia 200mg/mL solution in PFS    ? Loading dose: 2 PFS (400mg) SC Q 2 weeks x 3 doses     ? Maintenance dose: 1 PFS SC Q 2 weeks    Administration: Cimzia 200mg, 1 PFS into her leg + 1 PFS into stomach. Denies any difficulty with PFS.   Adherence: : first dose in office for TA on 8/31/23. Confirmed understanding her loading dose will continue for 2 more doses and maintenance to start mid-October. Reviewed upcoming dosing dates    ? Loading doses: 9/14 and 9/28     ? Maintenance: starting 10/12     Missed dose mgmt: none  Current SE: none     Mitigation/Mgmt: n/a      List Changes to Allergies, Diagnoses: none      Flares: none   Pain: declined to answer but shared the discomfort has been slightly improved.   R3: baseline collected during initial      Med Rec/Updated drug list: No changes since last assessment, reviewed with patient/caregiver.     Additional: Had a brief talk with Lisbeth who shared her Cimzia TA went well and doesn't anticipate any difficulty with upcoming self-admin on 9/14 for 2 PFS. Feels better since starting but isn't complete sure it's d/t the Cimzia since she was also on steroids. Advised benefits can be seen as early as 2 weeks but full benefits can take up to 3 months. She thanked me for the check-in, no questions/concerns at this time and welcomes future calls from clinical [facility] team.

## 2023-09-18 DIAGNOSIS — M05.79 RHEUMATOID ARTHRITIS INVOLVING MULTIPLE SITES WITH POSITIVE RHEUMATOID FACTOR (HCC): ICD-10-CM

## 2023-09-18 DIAGNOSIS — Z51.81 MEDICATION MONITORING ENCOUNTER: ICD-10-CM

## 2023-09-18 PROCEDURE — RXMED WILLOW AMBULATORY MEDICATION CHARGE: Performed by: INTERNAL MEDICINE

## 2023-09-18 RX ORDER — CERTOLIZUMAB PEGOL 200 MG/ML
INJECTION, SOLUTION SUBCUTANEOUS
Qty: 1 EACH | Refills: 0 | Status: SHIPPED | OUTPATIENT
Start: 2023-09-18 | End: 2023-09-18

## 2023-09-20 ENCOUNTER — TELEPHONE (OUTPATIENT)
Dept: PHARMACY | Facility: MEDICAL CENTER | Age: 28
End: 2023-09-20
Payer: MEDICAID

## 2023-09-20 NOTE — TELEPHONE ENCOUNTER
Contact: [phone]  Lisbeth Hernández        Phone number: [phone]    Name of person spoken with and relationship to patient: Lisbeth-self   Patient’s Adherence:            How patient is doing on medication:  very well    How many missed doses and reason: 0    Any new medications: patient is starting on cimzia 8C490pc/ml after her last injection of starter kit    Any new conditions: no    Any new allergies: no    Any new side effects: no     Any new diagnoses: no     How many doses remainin    Did patient want to speak with pharmacist: no  Delivery:            Delivery date and method:       Needs by Date:  10/11    Signature required:  no    Any additional details for :  na  Teach Appointment Date:  na  Shipping Address:  work address: [address] [place] [address] [place] ([employer] [place] [address]  Medication(name, strength and dose):  Cimzia 2 X 200 MG/ML Inject every 14 days  Copay: $0  Payment Method: na  Supplies:  na  Additional info: SUSANNA Bob. she is doing great on the medication so far. she has one last double dose from starter kit that she will take next . then she will be done with starter kit and starting on once every 14 days Cimzia on 10/12. will set call back date appropriately. she would like sent to her work address via am . no further questions

## 2023-09-29 ENCOUNTER — PHARMACY VISIT (OUTPATIENT)
Dept: PHARMACY | Facility: MEDICAL CENTER | Age: 28
End: 2023-09-29
Payer: COMMERCIAL

## 2023-10-26 ENCOUNTER — TELEMEDICINE (OUTPATIENT)
Dept: MEDICAL GROUP | Facility: MEDICAL CENTER | Age: 28
End: 2023-10-26
Attending: INTERNAL MEDICINE
Payer: MEDICAID

## 2023-10-26 DIAGNOSIS — G47.19 EXCESSIVE DAYTIME SLEEPINESS: ICD-10-CM

## 2023-10-26 DIAGNOSIS — D50.0 IRON DEFICIENCY ANEMIA DUE TO CHRONIC BLOOD LOSS: ICD-10-CM

## 2023-10-26 DIAGNOSIS — N92.6 MISSED PERIOD: ICD-10-CM

## 2023-10-26 DIAGNOSIS — R06.01 ORTHOPNEA: ICD-10-CM

## 2023-10-26 DIAGNOSIS — M05.79 RHEUMATOID ARTHRITIS INVOLVING MULTIPLE SITES WITH POSITIVE RHEUMATOID FACTOR (HCC): ICD-10-CM

## 2023-10-26 DIAGNOSIS — E66.9 OBESITY (BMI 30-39.9): ICD-10-CM

## 2023-10-26 PROBLEM — N93.9 VAGINAL SPOTTING: Status: RESOLVED | Noted: 2023-05-18 | Resolved: 2023-10-26

## 2023-10-26 PROBLEM — A74.9 CHLAMYDIA: Status: RESOLVED | Noted: 2023-06-23 | Resolved: 2023-10-26

## 2023-10-26 PROBLEM — Z12.4 SCREENING FOR MALIGNANT NEOPLASM OF CERVIX: Status: RESOLVED | Noted: 2023-06-01 | Resolved: 2023-10-26

## 2023-10-26 PROBLEM — M25.50 MULTIPLE JOINT PAIN: Status: RESOLVED | Noted: 2023-05-18 | Resolved: 2023-10-26

## 2023-10-26 PROBLEM — Z30.011 OCP (ORAL CONTRACEPTIVE PILLS) INITIATION: Status: RESOLVED | Noted: 2020-07-22 | Resolved: 2023-10-26

## 2023-10-26 PROCEDURE — 99214 OFFICE O/P EST MOD 30 MIN: CPT | Performed by: INTERNAL MEDICINE

## 2023-10-26 NOTE — ASSESSMENT & PLAN NOTE
Dentist recently did some throat imaging and told her she has a fairly small oropharynx.  Additionally, she has noticed that she wakes up gasping for air/stops breathing at night.  She is not sure if she snores.  She reports feeling unrested during the day and daytime sleepiness.  Her dentist recommended she get screened for JADE.

## 2023-10-26 NOTE — ASSESSMENT & PLAN NOTE
She is 1 week late on her current menstrual cycle.  She is trying to get pregnant.  Reports home pregnancy tests have been negative but is requesting blood pregnancy test.

## 2023-10-26 NOTE — ASSESSMENT & PLAN NOTE
Now following with rheumatology, on Cimzia as she is currently trying to get pregnant.  Reports excellent control of symptoms with this medication and denies side effects.

## 2023-10-26 NOTE — PROGRESS NOTES
Virtual Visit: Established Patient   This visit was conducted via Zoom using secure and encrypted videoconferencing technology.   The patient was in their home in the [facility].    The patient's identity was confirmed and verbal consent was obtained for this virtual visit.     Subjective:   CC: sleep apnea, referral, labs    Lisbeth Hernández is a 28 y.o. female presenting for evaluation and management of:    Orthopnea  Dentist recently did some throat imaging and told her she has a fairly small oropharynx.  Additionally, she has noticed that she wakes up gasping for air/stops breathing at night.  She is not sure if she snores.  She reports feeling unrested during the day and daytime sleepiness.  Her dentist recommended she get screened for JADE.      Iron deficiency anemia due to chronic blood loss  Most recent labs showed mild anemia with hemoglobin of 11.3.  She has a history of heavy menses and has been anemic related to this in the past.  She is not currently taking any supplemental iron.  Iron studies were not done with most recent labs.     Obesity (BMI 30-39.9)  Having some difficulty managing weight gain.  Would like to talk to a nutritionist.    Rheumatoid arthritis involving multiple sites with positive rheumatoid factor (HCC)  Now following with rheumatology, on Cimzia as she is currently trying to get pregnant.  Reports excellent control of symptoms with this medication and denies side effects.    Missed period  She is 1 week late on her current menstrual cycle.  She is trying to get pregnant.  Reports home pregnancy tests have been negative but is requesting blood pregnancy test.       Current medicines (including changes today)  Current Outpatient Medications   Medication Sig Dispense Refill    meloxicam (MOBIC) 15 MG tablet TAKE 1 TABLET BY MOUTH 1 TIME A DAY AS NEEDED FOR SEVERE PAIN OR MODERATE PAIN. 30 Tablet 3    certolizumab pegol (CIMZIA) 2 X 200 MG/ML Prefilled Syringe Kit Inject 200  "mg under the skin every 14 days. 3 Each 1     No current facility-administered medications for this visit.       Patient Active Problem List    Diagnosis Date Noted    Orthopnea 10/26/2023    Excessive daytime sleepiness 10/26/2023    Missed period 10/26/2023    Rheumatoid arthritis involving multiple sites with positive rheumatoid factor (HCC) 06/01/2023    Obesity (BMI 30-39.9) 04/01/2021    Cyclothymia 04/01/2021    History of abnormal cervical Pap smear 07/22/2020    Iron deficiency anemia due to chronic blood loss 06/18/2020        Objective:   Height: 5'7\"  Weight: 192 lb  RR: 12    Physical Exam:  Constitutional: Alert, no distress, well-groomed.  Skin: No rashes in visible areas.  Eye: Round. Conjunctiva clear, lids normal. No icterus.   ENMT: Lips pink without lesions, good dentition, moist mucous membranes. Phonation normal.  Neck: No masses, no thyromegaly. Moves freely without pain.  Respiratory: Unlabored respiratory effort, no cough or audible wheeze  Psych: Alert and oriented x3, normal affect and mood.     Assessment and Plan:   The following treatment plan was discussed:     1. Orthopnea  High suspicion for at least mild JADE, referral to sleep medicine placed  - Referral to Pulmonary and Sleep Medicine    2. Excessive daytime sleepiness  - Referral to Pulmonary and Sleep Medicine    3. Obesity (BMI 30-39.9)  - Referral to Nutrition Services    4. Missed period  - HCG QUANTITATIVE; Future    5. Iron deficiency anemia due to chronic blood loss  Will obtain updated labs to see if she would benefit from iron replacement  - IRON/TOTAL IRON BIND; Future  - FERRITIN; Future  - CBC WITHOUT DIFFERENTIAL; Future    6. Rheumatoid arthritis involving multiple sites with positive rheumatoid factor (HCC)  Continue f/u with rheum, Cimzia injections.    Follow-up: Return if symptoms worsen or fail to improve.           "

## 2023-10-26 NOTE — ASSESSMENT & PLAN NOTE
Most recent labs showed mild anemia with hemoglobin of 11.3.  She has a history of heavy menses and has been anemic related to this in the past.  She is not currently taking any supplemental iron.  Iron studies were not done with most recent labs.

## 2023-10-27 ENCOUNTER — HOSPITAL ENCOUNTER (OUTPATIENT)
Dept: LAB | Facility: MEDICAL CENTER | Age: 28
End: 2023-10-27
Attending: INTERNAL MEDICINE
Payer: MEDICAID

## 2023-10-27 DIAGNOSIS — D50.0 IRON DEFICIENCY ANEMIA DUE TO CHRONIC BLOOD LOSS: ICD-10-CM

## 2023-10-27 DIAGNOSIS — N92.6 MISSED PERIOD: ICD-10-CM

## 2023-10-27 DIAGNOSIS — A74.9 CHLAMYDIA: ICD-10-CM

## 2023-10-27 LAB
B-HCG SERPL-ACNC: <1 MIU/ML (ref 0–10)
ERYTHROCYTE [DISTWIDTH] IN BLOOD BY AUTOMATED COUNT: 45.3 FL (ref 35.9–50)
FERRITIN SERPL-MCNC: 9.7 NG/ML (ref 10–291)
HCT VFR BLD AUTO: 38.5 % (ref 37–47)
HGB BLD-MCNC: 12.2 G/DL (ref 12–16)
IRON SATN MFR SERPL: 11 % (ref 15–55)
IRON SERPL-MCNC: 47 UG/DL (ref 40–170)
MCH RBC QN AUTO: 24.5 PG (ref 27–33)
MCHC RBC AUTO-ENTMCNC: 31.7 G/DL (ref 32.2–35.5)
MCV RBC AUTO: 77.3 FL (ref 81.4–97.8)
PLATELET # BLD AUTO: 346 K/UL (ref 164–446)
PMV BLD AUTO: 11.3 FL (ref 9–12.9)
RBC # BLD AUTO: 4.98 M/UL (ref 4.2–5.4)
TIBC SERPL-MCNC: 441 UG/DL (ref 250–450)
UIBC SERPL-MCNC: 394 UG/DL (ref 110–370)
WBC # BLD AUTO: 5.1 K/UL (ref 4.8–10.8)

## 2023-10-27 PROCEDURE — 83540 ASSAY OF IRON: CPT

## 2023-10-27 PROCEDURE — 87491 CHLMYD TRACH DNA AMP PROBE: CPT

## 2023-10-27 PROCEDURE — 84702 CHORIONIC GONADOTROPIN TEST: CPT

## 2023-10-27 PROCEDURE — 87591 N.GONORRHOEAE DNA AMP PROB: CPT

## 2023-10-27 PROCEDURE — 85027 COMPLETE CBC AUTOMATED: CPT

## 2023-10-27 PROCEDURE — 83550 IRON BINDING TEST: CPT

## 2023-10-27 PROCEDURE — 36415 COLL VENOUS BLD VENIPUNCTURE: CPT

## 2023-10-27 PROCEDURE — 82728 ASSAY OF FERRITIN: CPT

## 2023-10-28 LAB
C TRACH DNA SPEC QL NAA+PROBE: NEGATIVE
N GONORRHOEA DNA SPEC QL NAA+PROBE: NEGATIVE
SPECIMEN SOURCE: NORMAL

## 2023-10-30 ENCOUNTER — PATIENT MESSAGE (OUTPATIENT)
Dept: MEDICAL GROUP | Facility: MEDICAL CENTER | Age: 28
End: 2023-10-30
Payer: MEDICAID

## 2023-10-30 PROCEDURE — RXMED WILLOW AMBULATORY MEDICATION CHARGE: Performed by: INTERNAL MEDICINE

## 2023-10-31 ENCOUNTER — PATIENT MESSAGE (OUTPATIENT)
Dept: MEDICAL GROUP | Facility: MEDICAL CENTER | Age: 28
End: 2023-10-31
Payer: MEDICAID

## 2023-10-31 ENCOUNTER — PHARMACY VISIT (OUTPATIENT)
Dept: PHARMACY | Facility: MEDICAL CENTER | Age: 28
End: 2023-10-31
Payer: COMMERCIAL

## 2023-10-31 DIAGNOSIS — D50.0 IRON DEFICIENCY ANEMIA DUE TO CHRONIC BLOOD LOSS: ICD-10-CM

## 2023-10-31 RX ORDER — FERROUS SULFATE 325(65) MG
325 TABLET ORAL
Qty: 12 TABLET | Refills: 5 | Status: SHIPPED | OUTPATIENT
Start: 2023-11-01 | End: 2023-11-09 | Stop reason: SDUPTHER

## 2023-11-09 ENCOUNTER — PATIENT MESSAGE (OUTPATIENT)
Dept: MEDICAL GROUP | Facility: MEDICAL CENTER | Age: 28
End: 2023-11-09
Payer: MEDICAID

## 2023-11-09 DIAGNOSIS — D50.0 IRON DEFICIENCY ANEMIA DUE TO CHRONIC BLOOD LOSS: ICD-10-CM

## 2023-11-09 RX ORDER — FERROUS SULFATE 325(65) MG
325 TABLET ORAL
Qty: 12 TABLET | Refills: 5 | Status: SHIPPED | OUTPATIENT
Start: 2023-11-10 | End: 2024-02-22

## 2023-11-10 NOTE — PATIENT COMMUNICATION
Rx routed to [facility] pharmacy, Pt request medication to be sent to [facility] on file. Rx to show update.   Received request via: Patient    Was the patient seen in the last year in this department? Yes    Does the patient have an active prescription (recently filled or refills available) for medication(s) requested? No    Does the patient have group home Plus and need 100 day supply (blood pressure, diabetes and cholesterol meds only)? Patient does not have SCP

## 2023-11-21 ENCOUNTER — HOSPITAL ENCOUNTER (OUTPATIENT)
Dept: LAB | Facility: MEDICAL CENTER | Age: 28
End: 2023-11-21
Attending: INTERNAL MEDICINE
Payer: MEDICAID

## 2023-11-21 ENCOUNTER — OFFICE VISIT (OUTPATIENT)
Dept: RHEUMATOLOGY | Facility: MEDICAL CENTER | Age: 28
End: 2023-11-21
Attending: INTERNAL MEDICINE
Payer: MEDICAID

## 2023-11-21 ENCOUNTER — TELEPHONE (OUTPATIENT)
Dept: RHEUMATOLOGY | Facility: MEDICAL CENTER | Age: 28
End: 2023-11-21

## 2023-11-21 VITALS
TEMPERATURE: 98.1 F | SYSTOLIC BLOOD PRESSURE: 120 MMHG | WEIGHT: 205 LBS | BODY MASS INDEX: 32.11 KG/M2 | RESPIRATION RATE: 14 BRPM | DIASTOLIC BLOOD PRESSURE: 70 MMHG | HEART RATE: 88 BPM | OXYGEN SATURATION: 97 %

## 2023-11-21 DIAGNOSIS — M05.79 RHEUMATOID ARTHRITIS INVOLVING MULTIPLE SITES WITH POSITIVE RHEUMATOID FACTOR (HCC): ICD-10-CM

## 2023-11-21 DIAGNOSIS — D50.0 IRON DEFICIENCY ANEMIA DUE TO CHRONIC BLOOD LOSS: ICD-10-CM

## 2023-11-21 DIAGNOSIS — K75.4 AUTOIMMUNE HEPATITIS (HCC): ICD-10-CM

## 2023-11-21 DIAGNOSIS — R79.89 ELEVATED LFTS: ICD-10-CM

## 2023-11-21 DIAGNOSIS — Z51.81 MEDICATION MONITORING ENCOUNTER: ICD-10-CM

## 2023-11-21 LAB
ALBUMIN SERPL BCP-MCNC: 4.1 G/DL (ref 3.2–4.9)
ALBUMIN/GLOB SERPL: 1.2 G/DL
ALP SERPL-CCNC: 70 U/L (ref 30–99)
ALT SERPL-CCNC: 164 U/L (ref 2–50)
ANION GAP SERPL CALC-SCNC: 12 MMOL/L (ref 7–16)
AST SERPL-CCNC: 107 U/L (ref 12–45)
BILIRUB SERPL-MCNC: 0.3 MG/DL (ref 0.1–1.5)
BUN SERPL-MCNC: 11 MG/DL (ref 8–22)
CALCIUM ALBUM COR SERPL-MCNC: 8.7 MG/DL (ref 8.5–10.5)
CALCIUM SERPL-MCNC: 8.8 MG/DL (ref 8.5–10.5)
CHLORIDE SERPL-SCNC: 104 MMOL/L (ref 96–112)
CO2 SERPL-SCNC: 22 MMOL/L (ref 20–33)
CREAT SERPL-MCNC: 0.5 MG/DL (ref 0.5–1.4)
ERYTHROCYTE [SEDIMENTATION RATE] IN BLOOD BY WESTERGREN METHOD: 10 MM/HOUR (ref 0–25)
GFR SERPLBLD CREATININE-BSD FMLA CKD-EPI: 131 ML/MIN/1.73 M 2
GLOBULIN SER CALC-MCNC: 3.4 G/DL (ref 1.9–3.5)
GLUCOSE SERPL-MCNC: 111 MG/DL (ref 65–99)
POTASSIUM SERPL-SCNC: 4 MMOL/L (ref 3.6–5.5)
PROT SERPL-MCNC: 7.5 G/DL (ref 6–8.2)
SODIUM SERPL-SCNC: 138 MMOL/L (ref 135–145)

## 2023-11-21 PROCEDURE — 3078F DIAST BP <80 MM HG: CPT | Performed by: INTERNAL MEDICINE

## 2023-11-21 PROCEDURE — 80053 COMPREHEN METABOLIC PANEL: CPT

## 2023-11-21 PROCEDURE — 3074F SYST BP LT 130 MM HG: CPT | Performed by: INTERNAL MEDICINE

## 2023-11-21 PROCEDURE — 36415 COLL VENOUS BLD VENIPUNCTURE: CPT

## 2023-11-21 PROCEDURE — 99214 OFFICE O/P EST MOD 30 MIN: CPT | Performed by: INTERNAL MEDICINE

## 2023-11-21 PROCEDURE — 85652 RBC SED RATE AUTOMATED: CPT

## 2023-11-21 PROCEDURE — 99211 OFF/OP EST MAY X REQ PHY/QHP: CPT | Mod: 25 | Performed by: INTERNAL MEDICINE

## 2023-11-21 RX ORDER — METHYLPREDNISOLONE 4 MG/1
TABLET ORAL
Qty: 21 TABLET | Refills: 1 | Status: SHIPPED | OUTPATIENT
Start: 2023-11-21 | End: 2024-02-22

## 2023-11-21 ASSESSMENT — FIBROSIS 4 INDEX: FIB4 SCORE: 0.34

## 2023-11-21 NOTE — TELEPHONE ENCOUNTER
Please notify patient that we received the results of her blood test and her liver tests are quite elevated, did patient drink any alcohol within the last 3 days?  If not we need to investigate why patient's liver tests are still elevated, I have ordered additional blood testing to check for hepatitis, and I have also ordered a liver ultrasound for evaluation of gallstones.

## 2023-11-29 PROCEDURE — RXMED WILLOW AMBULATORY MEDICATION CHARGE: Performed by: INTERNAL MEDICINE

## 2023-11-30 ENCOUNTER — APPOINTMENT (OUTPATIENT)
Dept: RHEUMATOLOGY | Facility: MEDICAL CENTER | Age: 28
End: 2023-11-30
Attending: INTERNAL MEDICINE
Payer: MEDICAID

## 2023-11-30 ENCOUNTER — HOSPITAL ENCOUNTER (OUTPATIENT)
Dept: LAB | Facility: MEDICAL CENTER | Age: 28
End: 2023-11-30
Attending: INTERNAL MEDICINE
Payer: MEDICAID

## 2023-11-30 ENCOUNTER — PHARMACY VISIT (OUTPATIENT)
Dept: PHARMACY | Facility: MEDICAL CENTER | Age: 28
End: 2023-11-30
Payer: COMMERCIAL

## 2023-11-30 DIAGNOSIS — R79.89 ELEVATED LFTS: ICD-10-CM

## 2023-11-30 PROCEDURE — 84450 TRANSFERASE (AST) (SGOT): CPT

## 2023-11-30 PROCEDURE — 86381 MITOCHONDRIAL ANTIBODY EACH: CPT

## 2023-11-30 PROCEDURE — 86256 FLUORESCENT ANTIBODY TITER: CPT

## 2023-11-30 PROCEDURE — 86015 ACTIN ANTIBODY EACH: CPT

## 2023-11-30 PROCEDURE — 86709 HEPATITIS A IGM ANTIBODY: CPT

## 2023-11-30 PROCEDURE — 84460 ALANINE AMINO (ALT) (SGPT): CPT

## 2023-11-30 PROCEDURE — 36415 COLL VENOUS BLD VENIPUNCTURE: CPT

## 2023-12-01 LAB
ALT SERPL-CCNC: 122 U/L (ref 2–50)
AST SERPL-CCNC: 76 U/L (ref 12–45)
HAV IGM SERPL QL IA: NORMAL

## 2023-12-02 LAB — MITOCHONDRIA M2 IGG SER-ACNC: 5.8 UNITS (ref 0–24.9)

## 2023-12-03 LAB
SMA IGG SER-ACNC: 26 UNITS (ref 0–19)
SMOOTH MUSCLE IGG TITR SER: ABNORMAL {TITER}

## 2023-12-04 ENCOUNTER — TELEPHONE (OUTPATIENT)
Dept: RHEUMATOLOGY | Facility: MEDICAL CENTER | Age: 28
End: 2023-12-04
Payer: MEDICAID

## 2023-12-04 NOTE — TELEPHONE ENCOUNTER
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

## 2023-12-11 ENCOUNTER — HOSPITAL ENCOUNTER (OUTPATIENT)
Dept: RADIOLOGY | Facility: MEDICAL CENTER | Age: 28
End: 2023-12-11
Attending: INTERNAL MEDICINE
Payer: MEDICAID

## 2023-12-11 DIAGNOSIS — R79.89 ELEVATED LFTS: ICD-10-CM

## 2023-12-11 PROCEDURE — 76705 ECHO EXAM OF ABDOMEN: CPT

## 2023-12-28 ENCOUNTER — TELEPHONE (OUTPATIENT)
Dept: RHEUMATOLOGY | Facility: MEDICAL CENTER | Age: 28
End: 2023-12-28
Payer: MEDICAID

## 2023-12-28 ENCOUNTER — TELEPHONE (OUTPATIENT)
Dept: PHARMACY | Facility: MEDICAL CENTER | Age: 28
End: 2023-12-28
Payer: MEDICAID

## 2023-12-28 PROCEDURE — RXMED WILLOW AMBULATORY MEDICATION CHARGE: Performed by: INTERNAL MEDICINE

## 2023-12-28 NOTE — TELEPHONE ENCOUNTER
Contact:             Phone number: [phone]     Name of person spoken with and relationship to patient: GIANFRANCO REYNOLDS  Medication:   Patient’s Adherence:             How patient is doing on medication: well     How many missed doses and reason: 0     Any new medications: FERROUS SULFATE     Any new conditions: INFLAMMED LIVER/HIGH ENZYMES     Any new allergies: no     Any new side effects: no      Any new diagnoses: no      How many doses remainin     Did patient want to speak with pharmacist: no   Delivery:             Delivery date and method:       Needs by Date: 24     Signature required: YES     Any additional details for : NA   Teach Appointment Date: NA   Shipping Address: [address] [place] [id number] ([employer])   Medication(name, strength and dose): Cimzia 6B082ln/ml Pskt INJECT ONCE Q30D   Copay: $0   Payment Method: NA   Supplies: NA  Additional Information:  Patient says she is not sure if this is a reaction to her medication but after taking it her liver is inflamed and her enzymes are high, they have referred her to a specialist but its not until May.

## 2023-12-28 NOTE — TELEPHONE ENCOUNTER
Please notify patient that we received some new information from pharmacy indicating there are some studies that indicate that Cimzia could possibly trigger autoimmune hepatitis, recommend to stop Cimzia, patient can cold turkey stop Cimzia, will need to have patient come in so we discuss alternative treatment options.

## 2023-12-29 ENCOUNTER — PHARMACY VISIT (OUTPATIENT)
Dept: PHARMACY | Facility: MEDICAL CENTER | Age: 28
End: 2023-12-29
Payer: COMMERCIAL

## 2024-01-04 ENCOUNTER — OFFICE VISIT (OUTPATIENT)
Dept: RHEUMATOLOGY | Facility: MEDICAL CENTER | Age: 29
End: 2024-01-04
Attending: INTERNAL MEDICINE
Payer: MEDICAID

## 2024-01-04 VITALS
BODY MASS INDEX: 33.05 KG/M2 | SYSTOLIC BLOOD PRESSURE: 110 MMHG | DIASTOLIC BLOOD PRESSURE: 60 MMHG | WEIGHT: 211 LBS | HEART RATE: 94 BPM | OXYGEN SATURATION: 97 % | TEMPERATURE: 98.1 F | RESPIRATION RATE: 14 BRPM

## 2024-01-04 DIAGNOSIS — Z33.1 IUP (INTRAUTERINE PREGNANCY), INCIDENTAL: ICD-10-CM

## 2024-01-04 DIAGNOSIS — M05.79 RHEUMATOID ARTHRITIS INVOLVING MULTIPLE SITES WITH POSITIVE RHEUMATOID FACTOR (HCC): ICD-10-CM

## 2024-01-04 DIAGNOSIS — D50.0 IRON DEFICIENCY ANEMIA DUE TO CHRONIC BLOOD LOSS: ICD-10-CM

## 2024-01-04 DIAGNOSIS — K75.4 AUTOIMMUNE HEPATITIS (HCC): ICD-10-CM

## 2024-01-04 PROCEDURE — 3078F DIAST BP <80 MM HG: CPT | Performed by: INTERNAL MEDICINE

## 2024-01-04 PROCEDURE — 99214 OFFICE O/P EST MOD 30 MIN: CPT | Performed by: INTERNAL MEDICINE

## 2024-01-04 PROCEDURE — 3074F SYST BP LT 130 MM HG: CPT | Performed by: INTERNAL MEDICINE

## 2024-01-04 PROCEDURE — 99211 OFF/OP EST MAY X REQ PHY/QHP: CPT | Performed by: INTERNAL MEDICINE

## 2024-01-04 ASSESSMENT — FIBROSIS 4 INDEX: FIB4 SCORE: 0.56

## 2024-01-04 NOTE — PROGRESS NOTES
Chief Complaint- joint pain     Subjective:   Lisbeth Hernández is a 28 y.o. female here today for follow up of rheumatological issues    This is a follow-up visit, third visit with us for this patient who we see in this clinic for serologically positive rheumatoid arthritis.  At last visit we had patient on Cimzia 200 mg subcu every 2 weeks which helped initially but then patient felt was not helping as much as she would like.  At last visit patient had decided to continue the Cimzia however patient has now become pregnant and has stopped the Cimzia.  Patient is 5 weeks pregnant.    Additionally, it was felt that Cimzia being an TNF inhibitor may have been exacerbating patient's autoimmune hepatitis.         PMHx  Iron deficient anemia  Rhinoplasty at 16 years old  Hx of saliva glands removed at 16 years old in [place]     Fam Hx   M-bipolar  F-hypercholesterol  Bx3 A/W  Aunt-DGm-RA     Soc Hx  Pos tob quit 2016  Positive vaping  No MJ  ETOH q month  Positive tattoos  No blood tx      S/p cimzia-felt to exacerbate autoimmune hepatitis  S/p plaquenil-N/V diarrhea  S/p sulfasalazine-strong family hx of sulfa allergy, patient hesitant      F-Actin 26 11/2023  Smooth muscle neg 11/2023  AMA neg 11/2023  HAV neg 11/2023  Notified patient, refer to GI/liver for evaluation     G6PD 17.6 adequate 8/2023  SSA neg 8/2023  SSB neg 8/2023  HBsAg/HBcAb neg 8/2023  HCV neg 8/2023  Quantiferon Gold neg 8/2023      5/2023  CCP neg 5/2023  HCV neg 3/2022  Hand x-rays 5/2023-IMPRESSION:  No evidence of fracture or arthropathy.    RUQ Ultrasound 12/2023-IMPRESSION:  1.  Hepatomegaly with evidence of hepatic steatosis.          Current Outpatient Medications   Medication Sig Dispense Refill    methylPREDNISolone (MEDROL) 4 MG Tab 6 tabs po one day then 5 tabs po one day then 4 tabs po one day then 3 tabs po one day then 2 tabs po one day then 1 tab po for one day 21 Tablet 1    meloxicam (MOBIC) 15 MG tablet TAKE 1 TABLET BY  MOUTH 1 TIME A DAY AS NEEDED FOR SEVERE PAIN OR MODERATE PAIN. 30 Tablet 3    ferrous sulfate 325 (65 Fe) MG tablet Take 1 Tablet by mouth every Monday, Wednesday, and Friday. (Patient not taking: Reported on 1/4/2024) 12 Tablet 5    certolizumab pegol (CIMZIA) 2 X 200 MG/ML Prefilled Syringe Kit Inject 200 mg under the skin every 14 days. (Patient not taking: Reported on 1/4/2024) 3 Each 1     No current facility-administered medications for this visit.     She  has a past medical history of Cyclothymia and Iron deficiency anemia due to chronic blood loss (6/18/2020).    ROS   Other than what is mentioned in HPI or physical exam, there is no history of headaches, double vision or blurred vision.  No trouble swallowing difficulties .  No chest complaints including chest pain, cough or sputum production. No GI complaints including nausea, vomiting, change in bowel habits, or past peptic ulcer disease. No history of blood in the stools. No urinary complaints including dysuria or frequency. No history of alopecia, photosensitivity     Objective:     /60   Pulse 94   Temp 36.7 °C (98.1 °F) (Temporal)   Resp 14   Wt 95.7 kg (211 lb)   SpO2 97%  Body mass index is 33.05 kg/m².   Physical Exam:    Constitutional: Alert and oriented X3, patient is talkative with good eye contact.Skin: Warm, dry, good turgor, no rashes in visible areas.Eye: Equal, round and reactive, conjunctiva clear, lids normal EOM intactENMT: Lips without lesions,  pinna without deformityNeck: Trachea midline, no masses, no thyromegaly.Lymph:  No cervical lymphadenopathy, no axillary lymphadenopathy, no supraclavicular lymphadenopathyRespiratory: Unlabored respiratory effort, lungs clear to auscultation, no wheezes, no ronchi.Cardiovascular: Normal S1, S2, Regular rate and rhythm, no murmurs rubs or gallops  .Abdomen: Soft, non-distended, overweightPsych: Alert and oriented x3, normal affect and mood.Neuro: Cranial nerves 2-12 are grossly  intact Ext:no joint laxity noted in bilateral arms, no joint laxity noted in bilateral legs close without crossover toes, no splay toes, shoulders good range of motion without limitations, no flexion contractures, no overt synovitis appreciated    Lab Results   Component Value Date/Time    HEPBCORIGM Non-Reactive 08/18/2023 07:02 AM    HEPBSAG Non-Reactive 08/18/2023 07:02 AM     Lab Results   Component Value Date/Time    HEPCAB Non-Reactive 08/18/2023 07:02 AM     Lab Results   Component Value Date/Time    SODIUM 138 11/21/2023 09:13 AM    POTASSIUM 4.0 11/21/2023 09:13 AM    CHLORIDE 104 11/21/2023 09:13 AM    CO2 22 11/21/2023 09:13 AM    GLUCOSE 111 (H) 11/21/2023 09:13 AM    BUN 11 11/21/2023 09:13 AM    CREATININE 0.50 11/21/2023 09:13 AM      Lab Results   Component Value Date/Time    WBC 5.1 10/27/2023 09:23 AM    RBC 4.98 10/27/2023 09:23 AM    HEMOGLOBIN 12.2 10/27/2023 09:23 AM    HEMATOCRIT 38.5 10/27/2023 09:23 AM    MCV 77.3 (L) 10/27/2023 09:23 AM    MCH 24.5 (L) 10/27/2023 09:23 AM    MCHC 31.7 (L) 10/27/2023 09:23 AM    MPV 11.3 10/27/2023 09:23 AM    NEUTSPOLYS 50.70 08/01/2023 08:19 PM    LYMPHOCYTES 37.80 08/01/2023 08:19 PM    MONOCYTES 7.70 08/01/2023 08:19 PM    EOSINOPHILS 2.70 08/01/2023 08:19 PM    BASOPHILS 0.90 08/01/2023 08:19 PM      Lab Results   Component Value Date/Time    CALCIUM 8.8 11/21/2023 09:13 AM    ASTSGOT 76 (H) 11/30/2023 08:28 AM    ALTSGPT 122 (H) 11/30/2023 08:28 AM    ALKPHOSPHAT 70 11/21/2023 09:13 AM    TBILIRUBIN 0.3 11/21/2023 09:13 AM    ALBUMIN 4.1 11/21/2023 09:13 AM    TOTPROTEIN 7.5 11/21/2023 09:13 AM     Lab Results   Component Value Date/Time    URICACID 4.2 05/19/2023 07:19 AM    RHEUMFACTN 100 (H) 05/19/2023 07:19 AM    CCPANTIBODY 10 05/19/2023 07:19 AM    ANTINUCAB None Detected 05/19/2023 07:19 AM     Lab Results   Component Value Date/Time    ANTISSBSJ 1 08/18/2023 07:02 AM     Lab Results   Component Value Date/Time    SEDRATEWES 10 11/21/2023 09:13  AM     Lab Results   Component Value Date/Time    HBA1C 5.5 03/17/2022 07:35 AM     Lab Results   Component Value Date/Time    G6PD 17.6 (H) 08/18/2023 07:02 AM     Lab Results   Component Value Date/Time    ANTIMITOCHO 5.8 11/30/2023 08:28 AM    FACTIN 26 (H) 11/30/2023 08:28 AM     Assessment and Plan:     1. Rheumatoid arthritis involving multiple sites with positive rheumatoid factor (HCC)  Currently off medications because of IUP, patient is now 5 weeks pregnant scheduled to see her obstetrician in about 1 to 2 weeks    2. Autoimmune hepatitis (HCC)  Currently off Cimzia, patient was referred to GI for evaluation, patient has an appointment May 2024    3. IUP (intrauterine pregnancy), incidental  About 5 weeks pregnant per patient report, has follow-up with obstetrician pending    4. Hyperglycemia  Patient has an appointment with nutritionist pending    Followup: Return in about 10 months (around 11/4/2024). or sooner quinn Hernández  was seen 30 minutes face-to-face of which more than 50% of the time was spent counseling the patient (excluding time for procedures)  regarding  rheumatological condition and care. Therapy was discussed in detail.      Please note that this dictation was created using voice recognition software. I have made every reasonable attempt to correct obvious errors, but I expect that there are errors of grammar and possibly content that I did not discover before finalizing the note.

## 2024-01-05 ENCOUNTER — NON-PROVIDER VISIT (OUTPATIENT)
Dept: INTERNAL MEDICINE | Facility: OTHER | Age: 29
End: 2024-01-05
Payer: MEDICAID

## 2024-01-05 VITALS — BODY MASS INDEX: 33.05 KG/M2 | WEIGHT: 211 LBS

## 2024-01-05 DIAGNOSIS — E66.9 OBESITY (BMI 30-39.9): ICD-10-CM

## 2024-01-05 DIAGNOSIS — D50.0 IRON DEFICIENCY ANEMIA DUE TO CHRONIC BLOOD LOSS: ICD-10-CM

## 2024-01-05 PROCEDURE — 97802 MEDICAL NUTRITION INDIV IN: CPT | Performed by: DIETITIAN, REGISTERED

## 2024-01-05 ASSESSMENT — FIBROSIS 4 INDEX: FIB4 SCORE: 0.56

## 2024-01-05 NOTE — PROGRESS NOTES
Lisbeth is a 28 y.o. female here for nutrition counseling/dietitian assessment for weight management.   BMI above 30 at 33    Labs from November 21, 2023   Glucose 111 (up from 104 earlier in the year)  No A1c noted in chart  AST 76       Lipids from 2022   TG at 175    She has gained about 40# over the last year   Was 170# and now around 210#     Waiting to see OB as she is currently 5 weeks pregnant     Family + of diabetes - unsure of the type and currently on insulin  ?maybe grandmother     Taking prenatals started 3 days ago     24 hour recall:   B - greek yogurt with granola, blueberries, honey  L - salad with quinoa brussles sprouts, sweet ptoatoes, chicken, pesto sauce  S - grapes, seaweed snacks  D - lentils, ground beef meat, veggies, basmati rice, burger     Beverages: almost gallon of water, tea Anise (I recommended she discontinue), stopped coffee/sugar lately, no milk, smoothies sometimes, occ soda  Alcohol: used to do socially but not anymore due to pregnancy and due to liver     Exercise: Erica class yesterday - just started 2 days ago  Trying to do 75Hard version and plan to do     PES: Obesity RT excessive calorie intake and physical inactivity AEB a current BMI of 33.05    Lisbeth has made some great changes to her diet/exercise habits since the new year but she also found out that she is pregnant and she is unsure of whether it is ok to lose weight while pregnant. Encouraged no weight loss but mostly working on healthy habits/exercise to help maintain her weight with 10-15 pounds of weight gain over her 40 week pregnancy.   We discussed the importance of general pregnancy food choices, answered her questions  Also reviewed in detail carbohydrate counting and recommend 45 grams of carbs per meal due to her elevated fasting glucose  Recommend A1c now vs waiting until 24 weeks to establish whether or not she has preexisting diabetes vs GDM if that does happen  Set goals; rtc with RD in 1-2  months    Time spent: 60 minutes

## 2024-01-05 NOTE — PATIENT INSTRUCTIONS
Goals:  Continue your plan with 75Medium and aim for at least 150 minutes of intentional movement per week. Stay hydrated and keep the room cooler  Start counting your carbohydrates and aim for 45-60 grams per MEAL and 15 grams of carbs per SNACK. Make sure to include protein and nonstarchy veggies with each meal   Stay hydrated. Keep to the prenatal vitamins

## 2024-01-08 ENCOUNTER — TELEPHONE (OUTPATIENT)
Dept: RHEUMATOLOGY | Facility: MEDICAL CENTER | Age: 29
End: 2024-01-08
Payer: MEDICAID

## 2024-01-08 DIAGNOSIS — Z33.1 IUP (INTRAUTERINE PREGNANCY), INCIDENTAL: ICD-10-CM

## 2024-01-08 DIAGNOSIS — M05.79 RHEUMATOID ARTHRITIS INVOLVING MULTIPLE SITES WITH POSITIVE RHEUMATOID FACTOR (HCC): ICD-10-CM

## 2024-01-08 DIAGNOSIS — K75.4 AUTOIMMUNE HEPATITIS (HCC): ICD-10-CM

## 2024-01-08 NOTE — TELEPHONE ENCOUNTER
Pt called asking for you to either put a stat request referral to GI ( as their first appt isnt until May)  or please refer her to [facility].        Thank you

## 2024-01-09 ENCOUNTER — TELEPHONE (OUTPATIENT)
Dept: RHEUMATOLOGY | Facility: MEDICAL CENTER | Age: 29
End: 2024-01-09
Payer: MEDICAID

## 2024-01-09 NOTE — TELEPHONE ENCOUNTER
A stat GI referral will need to come from her obstetrician if it is about her pregnancy.,  Please talk to her obstetrician

## 2024-01-09 NOTE — TELEPHONE ENCOUNTER
So come to find out her ins isn't accepted at [facility] so she is requesting a stat referral to [facility] here in [place] as she doesn't wanna take a chance that this will affect her pregnancy.    Thank you

## 2024-01-25 ENCOUNTER — APPOINTMENT (OUTPATIENT)
Dept: MEDICAL GROUP | Facility: MEDICAL CENTER | Age: 29
End: 2024-01-25
Payer: MEDICAID

## 2024-01-26 ENCOUNTER — TELEPHONE (OUTPATIENT)
Dept: PHARMACY | Facility: MEDICAL CENTER | Age: 29
End: 2024-01-26
Payer: MEDICAID

## 2024-01-27 NOTE — TELEPHONE ENCOUNTER
Spoke briefly with Lisbeth she is no longer taking the CIMZIA. She is pregnant and due to liver issues she will not resume taking this after the baby is born. Pt is no longer on service, Discontinuing TRX card.

## 2024-02-21 ENCOUNTER — HOSPITAL ENCOUNTER (EMERGENCY)
Facility: MEDICAL CENTER | Age: 29
End: 2024-02-21
Attending: EMERGENCY MEDICINE
Payer: MEDICAID

## 2024-02-21 ENCOUNTER — APPOINTMENT (OUTPATIENT)
Dept: RADIOLOGY | Facility: MEDICAL CENTER | Age: 29
End: 2024-02-21
Attending: EMERGENCY MEDICINE
Payer: MEDICAID

## 2024-02-21 VITALS
DIASTOLIC BLOOD PRESSURE: 62 MMHG | BODY MASS INDEX: 31.21 KG/M2 | OXYGEN SATURATION: 96 % | TEMPERATURE: 98.5 F | SYSTOLIC BLOOD PRESSURE: 110 MMHG | HEART RATE: 76 BPM | RESPIRATION RATE: 19 BRPM | WEIGHT: 199.3 LBS

## 2024-02-21 DIAGNOSIS — N93.9 VAGINAL BLEEDING: ICD-10-CM

## 2024-02-21 DIAGNOSIS — R79.89 ELEVATED SERUM HCG: ICD-10-CM

## 2024-02-21 DIAGNOSIS — R10.84 GENERALIZED ABDOMINAL PAIN: ICD-10-CM

## 2024-02-21 LAB
ALBUMIN SERPL BCP-MCNC: 3.9 G/DL (ref 3.2–4.9)
ALBUMIN/GLOB SERPL: 1 G/DL
ALP SERPL-CCNC: 68 U/L (ref 30–99)
ALT SERPL-CCNC: 26 U/L (ref 2–50)
ANION GAP SERPL CALC-SCNC: 12 MMOL/L (ref 7–16)
APPEARANCE UR: CLEAR
AST SERPL-CCNC: 22 U/L (ref 12–45)
B-HCG SERPL-ACNC: 356 MIU/ML (ref 0–5)
BASOPHILS # BLD AUTO: 0.5 % (ref 0–1.8)
BASOPHILS # BLD: 0.04 K/UL (ref 0–0.12)
BILIRUB SERPL-MCNC: 0.2 MG/DL (ref 0.1–1.5)
BILIRUB UR QL STRIP.AUTO: NEGATIVE
BUN SERPL-MCNC: 11 MG/DL (ref 8–22)
CALCIUM ALBUM COR SERPL-MCNC: 9 MG/DL (ref 8.5–10.5)
CALCIUM SERPL-MCNC: 8.9 MG/DL (ref 8.5–10.5)
CHLORIDE SERPL-SCNC: 102 MMOL/L (ref 96–112)
CO2 SERPL-SCNC: 21 MMOL/L (ref 20–33)
COLOR UR: YELLOW
CREAT SERPL-MCNC: 0.54 MG/DL (ref 0.5–1.4)
EOSINOPHIL # BLD AUTO: 0.14 K/UL (ref 0–0.51)
EOSINOPHIL NFR BLD: 1.7 % (ref 0–6.9)
ERYTHROCYTE [DISTWIDTH] IN BLOOD BY AUTOMATED COUNT: 48.1 FL (ref 35.9–50)
GFR SERPLBLD CREATININE-BSD FMLA CKD-EPI: 128 ML/MIN/1.73 M 2
GLOBULIN SER CALC-MCNC: 3.9 G/DL (ref 1.9–3.5)
GLUCOSE SERPL-MCNC: 112 MG/DL (ref 65–99)
GLUCOSE UR STRIP.AUTO-MCNC: NEGATIVE MG/DL
HCG SERPL QL: POSITIVE
HCT VFR BLD AUTO: 36.8 % (ref 37–47)
HGB BLD-MCNC: 12.2 G/DL (ref 12–16)
IMM GRANULOCYTES # BLD AUTO: 0.04 K/UL (ref 0–0.11)
IMM GRANULOCYTES NFR BLD AUTO: 0.5 % (ref 0–0.9)
KETONES UR STRIP.AUTO-MCNC: NEGATIVE MG/DL
LEUKOCYTE ESTERASE UR QL STRIP.AUTO: NEGATIVE
LIPASE SERPL-CCNC: 46 U/L (ref 11–82)
LYMPHOCYTES # BLD AUTO: 2.06 K/UL (ref 1–4.8)
LYMPHOCYTES NFR BLD: 24.6 % (ref 22–41)
MCH RBC QN AUTO: 25.6 PG (ref 27–33)
MCHC RBC AUTO-ENTMCNC: 33.2 G/DL (ref 32.2–35.5)
MCV RBC AUTO: 77.3 FL (ref 81.4–97.8)
MICRO URNS: NORMAL
MONOCYTES # BLD AUTO: 0.47 K/UL (ref 0–0.85)
MONOCYTES NFR BLD AUTO: 5.6 % (ref 0–13.4)
NEUTROPHILS # BLD AUTO: 5.61 K/UL (ref 1.82–7.42)
NEUTROPHILS NFR BLD: 67.1 % (ref 44–72)
NITRITE UR QL STRIP.AUTO: NEGATIVE
NRBC # BLD AUTO: 0 K/UL
NRBC BLD-RTO: 0 /100 WBC (ref 0–0.2)
PH UR STRIP.AUTO: 6.5 [PH] (ref 5–8)
PLATELET # BLD AUTO: 367 K/UL (ref 164–446)
PMV BLD AUTO: 10.8 FL (ref 9–12.9)
POTASSIUM SERPL-SCNC: 4 MMOL/L (ref 3.6–5.5)
PROT SERPL-MCNC: 7.8 G/DL (ref 6–8.2)
PROT UR QL STRIP: NEGATIVE MG/DL
RBC # BLD AUTO: 4.76 M/UL (ref 4.2–5.4)
RBC UR QL AUTO: NEGATIVE
SODIUM SERPL-SCNC: 135 MMOL/L (ref 135–145)
SP GR UR STRIP.AUTO: 1.01
UROBILINOGEN UR STRIP.AUTO-MCNC: 0.2 MG/DL
WBC # BLD AUTO: 8.4 K/UL (ref 4.8–10.8)

## 2024-02-21 PROCEDURE — 80053 COMPREHEN METABOLIC PANEL: CPT

## 2024-02-21 PROCEDURE — 36415 COLL VENOUS BLD VENIPUNCTURE: CPT

## 2024-02-21 PROCEDURE — 76817 TRANSVAGINAL US OBSTETRIC: CPT

## 2024-02-21 PROCEDURE — 84702 CHORIONIC GONADOTROPIN TEST: CPT

## 2024-02-21 PROCEDURE — 99285 EMERGENCY DEPT VISIT HI MDM: CPT

## 2024-02-21 PROCEDURE — 81003 URINALYSIS AUTO W/O SCOPE: CPT

## 2024-02-21 PROCEDURE — 85025 COMPLETE CBC W/AUTO DIFF WBC: CPT

## 2024-02-21 PROCEDURE — 83690 ASSAY OF LIPASE: CPT

## 2024-02-21 PROCEDURE — 84703 CHORIONIC GONADOTROPIN ASSAY: CPT

## 2024-02-21 PROCEDURE — 700111 HCHG RX REV CODE 636 W/ 250 OVERRIDE (IP): Mod: JZ,UD | Performed by: EMERGENCY MEDICINE

## 2024-02-21 PROCEDURE — 99283 EMERGENCY DEPT VISIT LOW MDM: CPT | Performed by: OBSTETRICS & GYNECOLOGY

## 2024-02-21 PROCEDURE — 96374 THER/PROPH/DIAG INJ IV PUSH: CPT

## 2024-02-21 RX ORDER — DICYCLOMINE HCL 20 MG
20 TABLET ORAL EVERY 6 HOURS PRN
Qty: 20 TABLET | Refills: 0 | Status: SHIPPED | OUTPATIENT
Start: 2024-02-21

## 2024-02-21 RX ORDER — ONDANSETRON 4 MG/1
4 TABLET, ORALLY DISINTEGRATING ORAL EVERY 8 HOURS PRN
Qty: 20 TABLET | Refills: 0 | Status: SHIPPED | OUTPATIENT
Start: 2024-02-21

## 2024-02-21 RX ORDER — ONDANSETRON 2 MG/ML
4 INJECTION INTRAMUSCULAR; INTRAVENOUS ONCE
Status: COMPLETED | OUTPATIENT
Start: 2024-02-21 | End: 2024-02-21

## 2024-02-21 RX ADMIN — ONDANSETRON 4 MG: 2 INJECTION INTRAMUSCULAR; INTRAVENOUS at 12:24

## 2024-02-21 ASSESSMENT — FIBROSIS 4 INDEX: FIB4 SCORE: 0.56

## 2024-02-21 NOTE — CONSULTS
GYNECOLOGY CONSULTATION    Patient Name: Lisbeth Hernández Age/Sex: 28 y.o. female    : 1995 MRN: [id number]    CONSULT INDICATION: S/p , continued elevated hcg    HISTORY OF PRESENT ILLNESS:  Ms. Hernández presented to the ED today due to abdominal pain. She has a history of abdominal pain and has an appointment with GI in a few months. About 3 weeks ago, she underwent a medical . She reports that she took mifepristone and misoprostol and reports that she passed the products of conception. She initially had heavy bleeding, but it has become light. She denies any abnormal discharge and is experiencing no pain. She does not have a gynecologist that she normally sees for care. The  was performed via telemedicine.     OBSTETRICAL HISTORY:      GYNECOLOGICAL HISTORY:  LMP: Patient's last menstrual period was 2024.  Reports she typically has a normal monthly cycle  She has not had a cycle since the      MEDICAL HISTORY:  Past Medical History:   Diagnosis Date    Cyclothymia     Iron deficiency anemia due to chronic blood loss 2020       SURGICAL HISTORY:  Past Surgical History:   Procedure Laterality Date    NE REPAIR OF NASAL SEPTUM  2010    RHINOPLASTY  2009    SUBMANDIBLE GLAND EXCISION      TUMOR EXCISION WITH BIOPSY  2007; 2009    benign tumors under tongue X3 surgeries       MEDICATIONS:  (Not in a hospital admission)      ALLERGIES:  Allergies   Allergen Reactions    Plaquenil [Hydroxychloroquine] Itching    Sulfa Drugs      Family history of allergy, pt has not taken       SOCIAL HISTORY:   reports that she quit smoking about 7 years ago. She started smoking about 9 years ago. She has a 0.2 pack-year smoking history. She has never used smokeless tobacco. She reports current alcohol use. She reports that she does not use drugs.    FAMILY HISTORY:  Clotting/bleeding disorders: no  Gynecological cancers: no  Family History   Problem Relation Age of Onset     Psychiatric Illness Mother         bipolar and depression    Hyperlipidemia Paternal Grandmother     Psychiatric Illness Maternal Aunt     Psychiatric Illness Maternal Uncle     Diabetes Paternal Aunt     Hypertension Paternal Aunt     Hyperlipidemia Paternal Aunt     Cancer Neg Hx     Stroke Neg Hx     Heart Disease Neg Hx        ROS:  Pertinent items are noted in HPI.    PHYSICAL EXAM:  Temp:  [36.9 °C (98.5 °F)-37.2 °C (98.9 °F)] 36.9 °C (98.5 °F)  Pulse:  [67-97] 76  Resp:  [19-20] 19  BP: (102-132)/(60-85) 110/62  SpO2:  [96 %-99 %] 96 %  General: AAOx3, NAD  Pulmonary: Respirations unlabored  Abdomen: Soft, non-tender    Laboratory Data:  Hcg 356    Imagin2024 1:06 PM     HISTORY/REASON FOR EXAM:  ABDOMINAL PAIN; NAUSEA        TECHNIQUE/EXAM DESCRIPTION: First trimester OB ultrasound.  Real-time OB transabdominal and transvaginal pelvis ultrasound was performed with grey-scale, color and duplex Doppler imaging.     COMPARISON: None     FINDINGS:     UTERUS:  There is no evidence of intrauterine gestation. There is heterogeneous echogenic vascular material within the endometrial canal suspicious for retained products of conception.     OVARIES:     The right ovary measures 2.29 cm x 1.18 cm x 2.33 cm. Doppler examination of the right ovary shows normal waveforms.     The left ovary measures 2.45 cm x 1.40 cm x 2.32 cm. Doppler examination of the left ovary shows normal waveforms.     No adnexal masses are seen.     No free fluid in the pelvis.     IMPRESSION:     1.  Heterogeneous vascular contents within the endometrial canal suspicious for retained products of conception.  2.  No evidence of intrauterine gestation.    ASSESSMENT:  Ms. Hernández is a 37 yo female who presents for abdominal pain. She is status post a medical  3 weeks ago, bchg is still elevated    PLAN:  1. We discussed that her hcg is still elevated which would be normal so soon after a termination  2. We reviewed the ultrasound.  Per her report of passing all the products, my review of the images, and her improvement of symptoms and bleeding, I suspect that she has had a complete . We discussed that we can trend her bhcg to 0. If it doesn't return to 0 soon, or if she develops worsening bleeding, fever, or abnormal discharge a D&C may be considered.    3. Outpatient weekly hcgs were ordered, and I will follow the result  4. She was counseled to follow up outpatient for surveillance. Return precautions were reviewed       Thank you for allowing us to participate in the care of this patient. Please contact me via Voalte for additional questions/concerns     Travis Smalls M.D.  Obstetrics and Gynecology  2024 5:22 PM

## 2024-02-21 NOTE — ED PROVIDER NOTES
ER Provider Note    Scribed for Dev Gautam M.D. by Prashant Alexander. 2/21/2024   12:03 PM    Primary Care Provider: Eugenia Agudelo M.D.    CHIEF COMPLAINT  Chief Complaint   Patient presents with    Abdominal Pain     Pt c/o rlq x 2 days. Pt woke up with epigastric pain this am with nausea     Nausea     EXTERNAL RECORDS REVIEWED  Outpatient Notes The patient has a history of Rheumatoid arthritis and is followed by Rheumatology     HPI/ROS  LIMITATION TO HISTORY   Select: : None    OUTSIDE HISTORIAN(S):  None    Lisbeth Hernández is a 28 y.o. female who presents to the ED for evaluation of abdominal pain, onset two days ago. The patient states her pain started as right lower quadrant abdominal pain. She describes this pain as an intermittent sharp stabbing pain that lasts approximately 30 seconds at a time. These episodes occurred multiple times within an hour. Then, this morning she woke up and began experiencing epigastric pain with associated nausea. She has a history of elevated LFTs and is supposed to follow-up with GI. She was concerned her pain may be related to this and decided to present to the ED for further evaluation. She also reports associated symptoms of loose stools. She denies any fevers or vomiting. She also denies any abdominal surgical history. No alleviating factors attempted.        PAST MEDICAL HISTORY  Past Medical History:   Diagnosis Date    Cyclothymia     Iron deficiency anemia due to chronic blood loss 6/18/2020       SURGICAL HISTORY  Past Surgical History:   Procedure Laterality Date    TX REPAIR OF NASAL SEPTUM  2010    RHINOPLASTY  2009    SUBMANDIBLE GLAND EXCISION      TUMOR EXCISION WITH BIOPSY  2007; 2009    benign tumors under tongue X3 surgeries       FAMILY HISTORY  Family History   Problem Relation Age of Onset    Psychiatric Illness Mother         bipolar and depression    Hyperlipidemia Paternal Grandmother     Psychiatric Illness Maternal Aunt     Psychiatric  Illness Maternal Uncle     Diabetes Paternal Aunt     Hypertension Paternal Aunt     Hyperlipidemia Paternal Aunt     Cancer Neg Hx     Stroke Neg Hx     Heart Disease Neg Hx        SOCIAL HISTORY   reports that she quit smoking about 7 years ago. She started smoking about 9 years ago. She has a 0.2 pack-year smoking history. She has never used smokeless tobacco. She reports current alcohol use. She reports that she does not use drugs.    CURRENT MEDICATIONS  Previous Medications    CERTOLIZUMAB PEGOL (CIMZIA) 2 X 200 MG/ML PREFILLED SYRINGE KIT    Inject 200 mg under the skin every 14 days.    FERROUS SULFATE 325 (65 FE) MG TABLET    Take 1 Tablet by mouth every Monday, Wednesday, and Friday.    MELOXICAM (MOBIC) 15 MG TABLET    TAKE 1 TABLET BY MOUTH 1 TIME A DAY AS NEEDED FOR SEVERE PAIN OR MODERATE PAIN.    METHYLPREDNISOLONE (MEDROL) 4 MG TAB    6 tabs po one day then 5 tabs po one day then 4 tabs po one day then 3 tabs po one day then 2 tabs po one day then 1 tab po for one day       ALLERGIES  Allergies   Allergen Reactions    Plaquenil [Hydroxychloroquine] Itching    Sulfa Drugs      Family history of allergy, pt has not taken        PHYSICAL EXAM  /85   Pulse 97   Temp 37.2 °C (98.9 °F) (Temporal)   Resp 20   Wt 90.4 kg (199 lb 4.7 oz)   LMP 02/21/2024 Comment: recent D/C 3 wks ago  SpO2 99%   BMI 31.21 kg/m²    Constitutional: Well developed, Well nourished, Mild distress,    HENT: Normocephalic, Atraumatic   Eyes: PERRLA, EOMI, Conjunctiva normal, No discharge.   Neck: No tenderness, Supple, No stridor.   Cardiovascular: Normal heart rate, Normal rhythm.   Thorax & Lungs: Clear to auscultation bilaterally, No respiratory distress.   Abdomen: Soft, Mild tenderness right lower quadrant and epigastric area, No masses.   Skin: Warm, Dry, No rash.    Musculoskeletal: No major deformities noted.  Neurologic: Awake, alert. Moves all extremities spontaneously.  Psychiatric: Affect normal, Judgment  normal, Mood normal.      DIAGNOSTIC STUDIES    Labs:   Results for orders placed or performed during the hospital encounter of 02/21/24   CBC WITH DIFFERENTIAL   Result Value Ref Range    WBC 8.4 4.8 - 10.8 K/uL    RBC 4.76 4.20 - 5.40 M/uL    Hemoglobin 12.2 12.0 - 16.0 g/dL    Hematocrit 36.8 (L) 37.0 - 47.0 %    MCV 77.3 (L) 81.4 - 97.8 fL    MCH 25.6 (L) 27.0 - 33.0 pg    MCHC 33.2 32.2 - 35.5 g/dL    RDW 48.1 35.9 - 50.0 fL    Platelet Count 367 164 - 446 K/uL    MPV 10.8 9.0 - 12.9 fL    Neutrophils-Polys 67.10 44.00 - 72.00 %    Lymphocytes 24.60 22.00 - 41.00 %    Monocytes 5.60 0.00 - 13.40 %    Eosinophils 1.70 0.00 - 6.90 %    Basophils 0.50 0.00 - 1.80 %    Immature Granulocytes 0.50 0.00 - 0.90 %    Nucleated RBC 0.00 0.00 - 0.20 /100 WBC    Neutrophils (Absolute) 5.61 1.82 - 7.42 K/uL    Lymphs (Absolute) 2.06 1.00 - 4.80 K/uL    Monos (Absolute) 0.47 0.00 - 0.85 K/uL    Eos (Absolute) 0.14 0.00 - 0.51 K/uL    Baso (Absolute) 0.04 0.00 - 0.12 K/uL    Immature Granulocytes (abs) 0.04 0.00 - 0.11 K/uL    NRBC (Absolute) 0.00 K/uL   COMP METABOLIC PANEL   Result Value Ref Range    Sodium 135 135 - 145 mmol/L    Potassium 4.0 3.6 - 5.5 mmol/L    Chloride 102 96 - 112 mmol/L    Co2 21 20 - 33 mmol/L    Anion Gap 12.0 7.0 - 16.0    Glucose 112 (H) 65 - 99 mg/dL    Bun 11 8 - 22 mg/dL    Creatinine 0.54 0.50 - 1.40 mg/dL    Calcium 8.9 8.5 - 10.5 mg/dL    Correct Calcium 9.0 8.5 - 10.5 mg/dL    AST(SGOT) 22 12 - 45 U/L    ALT(SGPT) 26 2 - 50 U/L    Alkaline Phosphatase 68 30 - 99 U/L    Total Bilirubin 0.2 0.1 - 1.5 mg/dL    Albumin 3.9 3.2 - 4.9 g/dL    Total Protein 7.8 6.0 - 8.2 g/dL    Globulin 3.9 (H) 1.9 - 3.5 g/dL    A-G Ratio 1.0 g/dL   LIPASE   Result Value Ref Range    Lipase 46 11 - 82 U/L   HCG QUAL SERUM   Result Value Ref Range    Beta-Hcg Qualitative Serum Positive (A) Negative   URINALYSIS,CULTURE IF INDICATED    Specimen: Urine   Result Value Ref Range    Color Yellow     Character Clear      Specific Gravity 1.009 <1.035    Ph 6.5 5.0 - 8.0    Glucose Negative Negative mg/dL    Ketones Negative Negative mg/dL    Protein Negative Negative mg/dL    Bilirubin Negative Negative    Urobilinogen, Urine 0.2 Negative    Nitrite Negative Negative    Leukocyte Esterase Negative Negative    Occult Blood Negative Negative    Micro Urine Req see below    BETA-HCG QUANTITATIVE SERUM   Result Value Ref Range    Bhcg 356.0 (H) 0.0 - 5.0 mIU/mL   ESTIMATED GFR   Result Value Ref Range    GFR (CKD-EPI) 128 >60 mL/min/1.73 m 2       Radiology:     Radiologist interpretation:   US-OB 1ST TRIMESTER WITH TRANSVAGINAL (COMBO)   Final Result      1.  Heterogeneous vascular contents within the endometrial canal suspicious for retained products of conception.   2.  No evidence of intrauterine gestation.             COURSE & MEDICAL DECISION MAKING     ED Observation Status? No; Patient does not meet criteria for ED Observation.     INITIAL ASSESSMENT, COURSE AND PLAN  Differential diagnoses include but not limited to: Viral, Constipation, Appendix, Gallbladder.      Care Narrative: Patient is coming in secondary to abdominal pain, sharp, intermittent, right lower side now epigastric area.  Laboratory test show the patient is pregnant, further history is obtained, apparently the patient took a  pill on February 3 and .  Have been bleeding, she felt like she passed the fetus, is still having some spotting but the bleeding overall is improved.  The patient does not feel pregnant.  Quant beta-hCG is 300, ultrasound shows retained products of conception.  Discussed case with Dr. Smalls with GYN, she will come and evaluate the patient, I will turn this case over to Dr. Edouard for further disposition.    3:45 PM  Discussed case with OB/GYN, she is just going to trend the beta-hCG's.  Discussed with the patient, repeat abdominal examination is benign.  At this time I do not believe the patient needs additional imaging.  I  reassured the patient, will put the patient on Bentyl, Zofran, have the patient return in the next 1 to 2 days if not improved, have the patient return with worsening symptoms.    12:03 PM - Patient was evaluated at bedside. Ordered for CT-Abdomen pelvis with, CBC with differential, CMP, Lipase, and HCG qual serum to evaluate. The patient will be medicated with Zofran injection 4 mg for her symptoms. Patient verbalizes understanding and support with my plan of care.     12:24 PM Patient was reevaluated at bedside. She informs me at this time she was pregnant, but three weeks ago took medication for a chemical . She reports bleeding heavily following this and visualizing the passing of the fetus. I informed her at this time her pregnancy test is still positive, therefore we cannot complete a CT. Will plan to order US-OB 1st trimester with transvaginal combo and Beta-HCG quantitative for further evaluation. Patient verbalizes understanding and agreement to this plan of care.      2:23 PM Paged OB/GYN.    DISPOSITION AND DISCUSSIONS    I have discussed management of the patient with the following physicians and CLIVE's:    Dr. Smalls (OB/GYN)     FINAL DIAGNOSIS  1. Generalized abdominal pain    2. Vaginal bleeding         IPrashant (Scribalisha), am scribing for, and in the presence of, Dev Gautam M.D..    Electronically signed by: Prashant Alexander (Dale), 2024    IDev M.D. personally performed the services described in this documentation, as scribed by Prashant Alexander in my presence, and it is both accurate and complete.      The note accurately reflects work and decisions made by me.  Dev Gautam M.D.  2024  3:21 PM

## 2024-02-21 NOTE — ED TRIAGE NOTES
Pt to triage .  Chief Complaint   Patient presents with    Abdominal Pain     Pt c/o rlq x 2 days. Pt woke up with epigastric pain this am with nausea     Nausea

## 2024-02-21 NOTE — ED NOTES
.Assumed report and patient care from NATALY Arellano. Patient is resting comfortably in bed with no signs of labored breathing or restlessness. POC discussed. No questions or concerns at this time.  Safety measures in place, call light within reach.

## 2024-02-22 ENCOUNTER — TELEMEDICINE (OUTPATIENT)
Dept: MEDICAL GROUP | Facility: MEDICAL CENTER | Age: 29
End: 2024-02-22
Attending: INTERNAL MEDICINE
Payer: MEDICAID

## 2024-02-22 VITALS — BODY MASS INDEX: 31.23 KG/M2 | RESPIRATION RATE: 16 BRPM | WEIGHT: 199 LBS | HEIGHT: 67 IN

## 2024-02-22 DIAGNOSIS — M05.79 RHEUMATOID ARTHRITIS INVOLVING MULTIPLE SITES WITH POSITIVE RHEUMATOID FACTOR (HCC): ICD-10-CM

## 2024-02-22 DIAGNOSIS — E61.1 IRON DEFICIENCY: ICD-10-CM

## 2024-02-22 DIAGNOSIS — Z13.1 SCREENING FOR DIABETES MELLITUS: ICD-10-CM

## 2024-02-22 DIAGNOSIS — Z13.220 SCREENING, LIPID: ICD-10-CM

## 2024-02-22 DIAGNOSIS — R73.09 ELEVATED GLUCOSE: ICD-10-CM

## 2024-02-22 DIAGNOSIS — D50.0 IRON DEFICIENCY ANEMIA DUE TO CHRONIC BLOOD LOSS: ICD-10-CM

## 2024-02-22 PROBLEM — N92.6 MISSED PERIOD: Status: RESOLVED | Noted: 2023-10-26 | Resolved: 2024-02-22

## 2024-02-22 PROCEDURE — 99214 OFFICE O/P EST MOD 30 MIN: CPT | Mod: 95 | Performed by: INTERNAL MEDICINE

## 2024-02-22 RX ORDER — FERROUS SULFATE 325(65) MG
325 TABLET ORAL DAILY
Qty: 30 TABLET | Refills: 1 | Status: SHIPPED | OUTPATIENT
Start: 2024-02-22

## 2024-02-22 RX ORDER — DESOGESTREL AND ETHINYL ESTRADIOL 0.15-0.03
1 KIT ORAL
COMMUNITY
Start: 2024-02-11

## 2024-02-22 ASSESSMENT — FIBROSIS 4 INDEX: FIB4 SCORE: 0.33

## 2024-02-23 NOTE — ASSESSMENT & PLAN NOTE
She reports that she has stopped her supplemental iron.  She recently got labs in the ER which showed a borderline low hemoglobin, low hematocrit, and microcytosis.

## 2024-02-23 NOTE — ASSESSMENT & PLAN NOTE
She is about her elevated glucose level concerned based on her most recent labs from the ER.  Glucose was 112.  She is requesting A1c.

## 2024-02-23 NOTE — PROGRESS NOTES
"Virtual Visit: Established Patient   This visit was conducted via Zoom using secure and encrypted videoconferencing technology.   The patient was in their home in the [facility].    The patient's identity was confirmed and verbal consent was obtained for this virtual visit.     Subjective:   CC: f/u hospital labs    Lisbeth Hernández is a 28 y.o. female presenting for evaluation and management of:    Iron deficiency  She reports that she has stopped her supplemental iron.  She recently got labs in the ER which showed a borderline low hemoglobin, low hematocrit, and microcytosis.    Rheumatoid arthritis involving multiple sites with positive rheumatoid factor (HCC)  She is not currently on the Cimzia.  She is still following with rheumatology.    Elevated glucose  She is about her elevated glucose level concerned based on her most recent labs from the ER.  Glucose was 112.  She is requesting A1c.       Current medicines (including changes today)  Current Outpatient Medications   Medication Sig Dispense Refill    ENSKYCE 0.15-30 MG-MCG per tablet Take 1 Tablet by mouth every day.      ferrous sulfate 325 (65 Fe) MG tablet Take 1 Tablet by mouth every day. 30 Tablet 1    dicyclomine (BENTYL) 20 MG Tab Take 1 Tablet by mouth every 6 hours as needed (vomiting). 20 Tablet 0    ondansetron (ZOFRAN ODT) 4 MG TABLET DISPERSIBLE Take 1 Tablet by mouth every 8 hours as needed for Nausea/Vomiting. 20 Tablet 0     No current facility-administered medications for this visit.       Patient Active Problem List    Diagnosis Date Noted    Elevated glucose 02/22/2024    Orthopnea 10/26/2023    Excessive daytime sleepiness 10/26/2023    Rheumatoid arthritis involving multiple sites with positive rheumatoid factor (HCC) 06/01/2023    Obesity (BMI 30-39.9) 04/01/2021    Cyclothymia 04/01/2021    History of abnormal cervical Pap smear 07/22/2020    Iron deficiency 06/18/2020        Objective:   Resp 16   Ht 1.702 m (5' 7\")   Wt " 90.3 kg (199 lb)   LMP 02/21/2024 Comment: recent D/C 3 wks ago  BMI 31.17 kg/m²     Physical Exam  Constitutional: Alert, no distress, well-groomed.  Skin: No rashes in visible areas.  Eye: Round. Conjunctiva clear, lids normal. No icterus.   ENMT: Lips pink without lesions, good dentition, moist mucous membranes. Phonation normal.  Neck: No masses, no thyromegaly. Moves freely without pain.  Respiratory: Unlabored respiratory effort, no cough or audible wheeze  Psych: Alert and oriented x3, normal affect and mood.     Assessment and Plan:   The following treatment plan was discussed:     1. Iron deficiency  We discussed restarting her supplemental iron and obtaining labs in 1 to 2 months to recheck  - IRON/TOTAL IRON BIND; Future  - FERRITIN; Future  - ferrous sulfate 325 (65 Fe) MG tablet; Take 1 Tablet by mouth every day.  Dispense: 30 Tablet; Refill: 1    2. Screening for diabetes mellitus  3. Elevated glucose  - HEMOGLOBIN A1C; Future    3. Screening, lipid  - Lipid Profile; Future    4. Rheumatoid arthritis involving multiple sites with positive rheumatoid factor (HCC)  Currently off of therapy, she will follow-up with rheumatology.          Follow-up: Return if symptoms worsen or fail to improve.

## 2024-02-29 ENCOUNTER — OFFICE VISIT (OUTPATIENT)
Dept: RHEUMATOLOGY | Facility: MEDICAL CENTER | Age: 29
End: 2024-02-29
Attending: INTERNAL MEDICINE
Payer: MEDICAID

## 2024-02-29 VITALS
SYSTOLIC BLOOD PRESSURE: 110 MMHG | TEMPERATURE: 97.9 F | RESPIRATION RATE: 14 BRPM | OXYGEN SATURATION: 97 % | WEIGHT: 200 LBS | HEART RATE: 108 BPM | BODY MASS INDEX: 31.32 KG/M2 | DIASTOLIC BLOOD PRESSURE: 62 MMHG

## 2024-02-29 DIAGNOSIS — M05.79 RHEUMATOID ARTHRITIS INVOLVING MULTIPLE SITES WITH POSITIVE RHEUMATOID FACTOR (HCC): ICD-10-CM

## 2024-02-29 DIAGNOSIS — Z79.899 ON ABATACEPT THERAPY: ICD-10-CM

## 2024-02-29 DIAGNOSIS — K75.4 AUTOIMMUNE HEPATITIS (HCC): ICD-10-CM

## 2024-02-29 DIAGNOSIS — R73.9 HYPERGLYCEMIA: ICD-10-CM

## 2024-02-29 DIAGNOSIS — Z79.620 ADALIMUMAB (HUMIRA) LONG-TERM USE: ICD-10-CM

## 2024-02-29 PROCEDURE — 99211 OFF/OP EST MAY X REQ PHY/QHP: CPT | Performed by: INTERNAL MEDICINE

## 2024-02-29 PROCEDURE — 3078F DIAST BP <80 MM HG: CPT | Performed by: INTERNAL MEDICINE

## 2024-02-29 PROCEDURE — 99214 OFFICE O/P EST MOD 30 MIN: CPT | Performed by: INTERNAL MEDICINE

## 2024-02-29 PROCEDURE — 3074F SYST BP LT 130 MM HG: CPT | Performed by: INTERNAL MEDICINE

## 2024-02-29 RX ORDER — ABATACEPT 125 MG/ML
INJECTION, SOLUTION SUBCUTANEOUS
Qty: 12 ML | Refills: 1 | Status: SHIPPED | OUTPATIENT
Start: 2024-02-29

## 2024-02-29 ASSESSMENT — FIBROSIS 4 INDEX: FIB4 SCORE: 0.33

## 2024-02-29 ASSESSMENT — JOINT PAIN
TOTAL NUMBER OF SWOLLEN JOINTS: 6
TOTAL NUMBER OF SWOLLEN JOINTS: 5
TOTAL NUMBER OF TENDER JOINTS: 6
TOTAL NUMBER OF TENDER JOINTS: 7

## 2024-02-29 NOTE — PROGRESS NOTES
Chief Complaint- joint pain     Subjective:   Lisbeth Hernández is a 28 y.o. female here today for follow up of rheumatological issues    This is a follow-up visit for this patient who we see in this clinic for serologically positive rheumatoid arthritis.  At last visit patient was pregnant, had been on Cimzia, but the Cimzia was stopped because of pregnancy.  Patient comes in today stating that she is terminated the pregnancy and feels that her arthritis is flaring and would like to go back on treatment options.  Patient status post Cimzia but that caused exacerbation of patient's autoimmune hepatitis.      PMHx  Iron deficient anemia  Rhinoplasty at 16 years old  Hx of saliva glands removed at 16 years old in [place]     Fam Hx   M-bipolar  F-hypercholesterol  Bx3 A/W  Aunt-DGm-RA     Soc Hx  Pos tob quit 2016  Positive vaping  No MJ  ETOH q month  Positive tattoos  No blood tx     s/p Cimzia-as Cimzia is TNF inhibitor, it exacerbated patient's autoimmune hepatitis   S/p plaquenil-N/V diarrhea  S/p sulfasalazine-strong family hx of sulfa allergy, patient hesitant   S/p MTX-did not try for fear of exacerbating autoimmune hepatitis  S/p arava-did not try for fear of exacerbating autoimmune hepatitis     F-Actin 26 11/2023  Smooth muscle neg 11/2023  AMA neg 11/2023  HAV neg 11/2023  Notified patient, refer to GI/liver for evaluation     G6PD 17.6 adequate 8/2023  SSA neg 8/2023  SSB neg 8/2023  HBsAg/HBcAb neg 8/2023  HCV neg 8/2023  Quantiferon Gold neg 8/2023      5/2023  CCP neg 5/2023  HCV neg 3/2022  Hand x-rays 5/2023-IMPRESSION:  No evidence of fracture or arthropathy.     RUQ Ultrasound 12/2023-IMPRESSION:  1.  Hepatomegaly with evidence of hepatic steatosis.           Current Outpatient Medications   Medication Sig Dispense Refill    Adalimumab 40 MG/0.4ML Pen-injector Kit 40 mg SQ every 14 days 6 Each 1    ENSKYCE 0.15-30 MG-MCG per tablet Take 1 Tablet by mouth every day.      ferrous sulfate 325  (65 Fe) MG tablet Take 1 Tablet by mouth every day. 30 Tablet 1    dicyclomine (BENTYL) 20 MG Tab Take 1 Tablet by mouth every 6 hours as needed (vomiting). 20 Tablet 0    ondansetron (ZOFRAN ODT) 4 MG TABLET DISPERSIBLE Take 1 Tablet by mouth every 8 hours as needed for Nausea/Vomiting. 20 Tablet 0     No current facility-administered medications for this visit.     She  has a past medical history of Cyclothymia and Iron deficiency anemia due to chronic blood loss (6/18/2020).    ROS   Other than what is mentioned in HPI or physical exam, there is no history of headaches, double vision or blurred vision.  No trouble swallowing difficulties .  No chest complaints including chest pain, cough or sputum production. No GI complaints including nausea, vomiting, change in bowel habits, or past peptic ulcer disease. No history of blood in the stools. No urinary complaints including dysuria or frequency. No history of alopecia, photosensitivity     Objective:     /62   Pulse (!) 108   Temp 36.6 °C (97.9 °F) (Temporal)   Resp 14   Wt 90.7 kg (200 lb)   SpO2 97%  Body mass index is 31.32 kg/m².   Physical Exam:    Constitutional: Alert and oriented X3, patient is talkative with good eye contact.Skin: Warm, dry, good turgor, no rashes in visible areas, multiple tattoos eye: Equal, round and reactive, conjunctiva clear, lids normal EOM intactENMT: Lips without lesions,  pinna without deformityNeck: Trachea midline, no masses, no thyromegaly.Lymph:  No cervical lymphadenopathy, no axillary lymphadenopathy, no supraclavicular lymphadenopathyRespiratory: Unlabored respiratory effort, lungs clear to auscultation, no wheezes, no ronchi.Cardiovascular: Normal S1, S2, Regular rate and rhythm, no murmurs rubs or gallops  .Abdomen: Soft, non-distended, overweight.Psych: Alert and oriented x3, normal affect and mood.Neuro: Cranial nerves 2-12 are grossly intact Ext:no joint laxity noted in bilateral arms, no joint laxity noted  in bilateral legs      Lab Results   Component Value Date/Time    HEPBCORIGM Non-Reactive 08/18/2023 07:02 AM    HEPBSAG Non-Reactive 08/18/2023 07:02 AM     Lab Results   Component Value Date/Time    HEPCAB Non-Reactive 08/18/2023 07:02 AM     Lab Results   Component Value Date/Time    SODIUM 135 02/21/2024 11:35 AM    POTASSIUM 4.0 02/21/2024 11:35 AM    CHLORIDE 102 02/21/2024 11:35 AM    CO2 21 02/21/2024 11:35 AM    GLUCOSE 112 (H) 02/21/2024 11:35 AM    BUN 11 02/21/2024 11:35 AM    CREATININE 0.54 02/21/2024 11:35 AM      Lab Results   Component Value Date/Time    WBC 8.4 02/21/2024 11:35 AM    RBC 4.76 02/21/2024 11:35 AM    HEMOGLOBIN 12.2 02/21/2024 11:35 AM    HEMATOCRIT 36.8 (L) 02/21/2024 11:35 AM    MCV 77.3 (L) 02/21/2024 11:35 AM    MCH 25.6 (L) 02/21/2024 11:35 AM    MCHC 33.2 02/21/2024 11:35 AM    MPV 10.8 02/21/2024 11:35 AM    NEUTSPOLYS 67.10 02/21/2024 11:35 AM    LYMPHOCYTES 24.60 02/21/2024 11:35 AM    MONOCYTES 5.60 02/21/2024 11:35 AM    EOSINOPHILS 1.70 02/21/2024 11:35 AM    BASOPHILS 0.50 02/21/2024 11:35 AM      Lab Results   Component Value Date/Time    CALCIUM 8.9 02/21/2024 11:35 AM    ASTSGOT 22 02/21/2024 11:35 AM    ALTSGPT 26 02/21/2024 11:35 AM    ALKPHOSPHAT 68 02/21/2024 11:35 AM    TBILIRUBIN 0.2 02/21/2024 11:35 AM    ALBUMIN 3.9 02/21/2024 11:35 AM    TOTPROTEIN 7.8 02/21/2024 11:35 AM     Lab Results   Component Value Date/Time    URICACID 4.2 05/19/2023 07:19 AM    RHEUMFACTN 100 (H) 05/19/2023 07:19 AM    CCPANTIBODY 10 05/19/2023 07:19 AM    ANTINUCAB None Detected 05/19/2023 07:19 AM     Lab Results   Component Value Date/Time    ANTISSBSJ 1 08/18/2023 07:02 AM     Lab Results   Component Value Date/Time    SEDRATEWES 10 11/21/2023 09:13 AM     Lab Results   Component Value Date/Time    HBA1C 5.5 03/17/2022 07:35 AM     Lab Results   Component Value Date/Time    G6PD 17.6 (H) 08/18/2023 07:02 AM     Lab Results   Component Value Date/Time    ANTIMITOCHO 5.8 11/30/2023  08:28 AM    FACTIN 26 (H) 11/30/2023 08:28 AM     Assessment and Plan:     1. Rheumatoid arthritis involving multiple sites with positive rheumatoid factor (HCC)  Long discussion with patient regarding treatment options, unable to do TNF inhibitors as it tends to exacerbate patient's autoimmune hepatitis, we opted to do a trial of Orencia 125 mg subcu every week.  - Adalimumab 40 MG/0.4ML Pen-injector Kit; 40 mg SQ every 14 days  Dispense: 6 Each; Refill: 1  - CBC WITH DIFFERENTIAL; Future  - Comp Metabolic Panel; Future  - Sed Rate; Future    2. Orencia  Start Orencia 125 mg subcu every week, screening labs are up-to-date, neck screening labs due August 2025, patient needs monitoring labs every 6 months, next labs will be due about August 2024, labs ordered for patient  We reviewed risks of biological medications with patient including hematological pathology, cancer risks, neurological and infection issues especially in the Covid-19 pandemic environment, patient states understanding.  - Adalimumab 40 MG/0.4ML Pen-injector Kit; 40 mg SQ every 14 days  Dispense: 6 Each; Refill: 1    3. Autoimmune hepatitis (HCC)  Liver tests currently stable, patient does have an appointment with GI in May 2024  - CBC WITH DIFFERENTIAL; Future  - Comp Metabolic Panel; Future  - Sed Rate; Future    4. Hyperglycemia  Followed by patients PCP, high blood sugar can exacerbate inflammatory state of patient's arthritis, discussed with patient the need to monitor and control blood sugars, patient states understanding  - CBC WITH DIFFERENTIAL; Future  - Comp Metabolic Panel; Future  - Sed Rate; Future    Followup: Return in about 3 months (around 5/29/2024). or sooner quinn Hernández  was seen 30 minutes face-to-face of which more than 50% of the time was spent counseling the patient (excluding time for procedures)  regarding  rheumatological condition and care. Therapy was discussed in detail.      Please note that this  dictation was created using voice recognition software. I have made every reasonable attempt to correct obvious errors, but I expect that there are errors of grammar and possibly content that I did not discover before finalizing the note.

## 2024-03-01 ENCOUNTER — TELEPHONE (OUTPATIENT)
Dept: RHEUMATOLOGY | Facility: MEDICAL CENTER | Age: 29
End: 2024-03-01
Payer: MEDICAID

## 2024-03-01 NOTE — TELEPHONE ENCOUNTER
PA approved. PA Case: [id number], Status: Approved, Coverage Starts on: 2/29/2024 12:00:00 AM, Coverage Ends on: 2/28/2025 12:00:00 AM.  Authorization Expiration Date: 2/27/2025    Unable to test claim.

## 2024-03-06 ENCOUNTER — TELEPHONE (OUTPATIENT)
Dept: RHEUMATOLOGY | Facility: MEDICAL CENTER | Age: 29
End: 2024-03-06
Payer: MEDICAID

## 2024-03-06 PROCEDURE — RXMED WILLOW AMBULATORY MEDICATION CHARGE: Performed by: INTERNAL MEDICINE

## 2024-03-06 NOTE — TELEPHONE ENCOUNTER
Attempted to contact patient at [phone] to discuss [facility] and services/benefits offered. No answer, left voicemail.    Antonette North

## 2024-03-07 ENCOUNTER — DOCUMENTATION (OUTPATIENT)
Dept: PHARMACY | Facility: MEDICAL CENTER | Age: 29
End: 2024-03-07
Payer: MEDICAID

## 2024-03-07 NOTE — PROGRESS NOTES
PHARMACIST PRE SCREEN - Transfer Onboarding  Diagnosis: Rheumatoid arthritis involving multiple sites with positive rheumatoid factor [M05.79];      Drug & Non-Drug Allergies:   EMR Reviewed. No concerning drug or non-drug allergies. Sulfa noted     Drug Therapy (name/formulation/dose/route of admin/freq): Orencia 125mg/mL auto-injector, inject 1 pen SQ every 7 days   EMR Reviewed         -Dose Appropriateness (Y/N): Y         -Renal or hepatic dose adjustments (Y/N): N         -Any comorbidities, PMH, precautions or contraindications exist that pose medication safety concerns (Y/N): N, autoimmune hepatitis          -Any relevant lab(s) needed that affects the initial start date (Y/N): N  List Past Treatments: Cimzia, Plaquenil   EMR Med List reviewed. List DDI’s: no clinically significant DDIs     Patient’s ability to self-administer medication:   No issues identified per EMR    List Goals of Therapy:   Decrease disease activity (RAPID-3)     Prevent and reduce flares and inflammation    Alleviate pain     Decrease symptoms associated with RA     Maintain function of Activities of Daily Living (ADLs)              Initial Assessment   Dx: Rheumatoid arthritis involving multiple sites with positive rheumatoid factor [M05.79];        Tx prescribed: Orencia 125mg/mL auto-injector, inject 1 pen SQ every 7 days     - Administration: was on Cimzia so familiar with injections      - SQ- front of thigh, abdomen 2 inches away from belly button, back of arm if someone else is giving     - Clean skin with alcohol pad and let dry completely to avoid stinging      - Rotate sites     -  inject into clear skin with no bruises, scars, or stretchmarks      - Pen use:       - Take out of fridge 30 min before injecting to let it come to room temp        - Pull orange needle cover straight off      - Press firmly against skin at 90degree angle      - Press and hold for 15 seconds, will hear a click when starting and dosing window will  turn blue, wait until blue indicator stops moving before removing      - Pull straight out and discard in sharps     - Proper Handling/Disposal: Sharps container     - Storage/Excursion: Fridge, room temp for up to 6hrs, call if ever forget out     - Duration of therapy:  until ineffective or intolerable side effects        Adherence & Potential Barriers: same day every week that works best for you, encouraged phone alarm reminder   Adherence Predictor Tool-  understands importance for helping her RA, $0 copay, no concerns    - Missed dose mgmt: Call MD for further guidance        List Common, Serious SE & Mitigation Reviewed:   - Nausea: stay hydrated, smaller and more frequent meals, zofran as needed and if not helping let MD know    - Increased risk of infections, watch for signs of infections such as fever >100.5 or chills, if fighting something off let MD know as you may be instructed to hold dose    List Precautions Reviewed:    - Pregnancy: if trying to conceive in future or pregnant discuss with MD, not recommended in pregnancy    - May increase risk of certain skin cancers such as skin cancer, use sunscreen in the sun, if notice any changes in skin or moles let MD know, advised to have annual skin exams    List Current SE Reviewed (if applicable): hasn't started    - Mitigation/mgmt: n/a       S/Sx: joint pain daily, hands and ankle, shoulder pain comes and goes    # of flares:  none recent    Pain scale: 5-7/10 but depends, will get worse at night and morning sometimes 10/10   Rapid3 score(RA only): FN Domain sub-total: 4.6, Grand total: 15.6, high activity       Clinically Relevant, Abnormal Labs: 2/21/24   - CBC: Hct: low 36.8   - Chem7: glucose: high 112    - Cr: 0.54, GFR: 128   - LFTs/Tbili: WNL   - Sed rate: 10   - RA factor: high, 100 (5/19/23)   - CRP: high 1.92 (5/19/23)   - HAV Ab: nonreactive (11/30/23)   - HBV Antigen and Core antibody: nonreactive (8/18/23)   - HCV antibody: nonreactive  (8/18/23)   - QFT Gold Plus, TB: negative (8/18/23)       Wellness/Lifestyle Counseling:    - Support/QOL: currently has daily joint pain, not missing anything specific because of RA but 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 (+) APAP as needed or naproxen- sometimes ibuprofen instead of naproxen    Current Meds:    - Orencia   - Ferrous sulfate   - Bentyl    - Zofran as needed   - Enskyce   - APAP as needed   - Naproxen or ibuprofen as needed    DI Check:     - Cat X: ibuprofen + naproxen both NSAIDS, don’t take together- doesn't take together, one or the other when needed    Common DI & Dietary Avoidance: can call if starting any new meds to confirm no DDIs       Goals of Therapy:     Decrease disease activity (RAPID-3)      Prevent and reduce flares and inflammation     Alleviate pain      Decrease symptoms associated with RA      Maintain function of Activities of Daily Living (ADLs)     Patient has agreed/understands to goals of therapy during education/counseling       Additional:    Had the pleasure of speaking with Lisbeth to review her new RA therapy,  Orencia. She was previously on Cimzia so she's familiar with injections, discontinued due to autoimmune hepatitis. Provided thorough review of Orencia as detailed above, including injecting training and pen use. When reviewing side effects she asked if information could be emailed to her, let her know I'd see if it could be included with shipment. She currently has joint pain daily, 5-7/10 depending on day. No recent flares. RAPID3 15.6, high activity. Discussed that when assessed around 4 months will hopefully see decrease in score and pain. Let her know it may take 4-6 months to see full effect but will hopefully start to  see improvement sooner. Taking APAP, naproxen, or ibuprofen now as needed for pain. Confirmed she has clinical pharmacist phone number and encouraged her to call with any questions or concerns. She was appreciative of review and welcoming to future calls, knows we'll check in next week to see how first injection went. Next [facility] immunizations.

## 2024-03-08 ENCOUNTER — PHARMACY VISIT (OUTPATIENT)
Dept: PHARMACY | Facility: MEDICAL CENTER | Age: 29
End: 2024-03-08
Payer: COMMERCIAL

## 2024-03-15 ENCOUNTER — DOCUMENTATION (OUTPATIENT)
Dept: PHARMACY | Facility: MEDICAL CENTER | Age: 29
End: 2024-03-15
Payer: MEDICAID

## 2024-03-15 NOTE — PROGRESS NOTES
First Cycle    Dx: Rheumatoid arthritis involving multiple sites with positive rheumatoid factor [M05.79]        Txt review: Orencia 125mg/mL auto-injector, inject 1 pen SQ every 7 days     - Administration: injected 1 pen into right thigh, no issues        Adherence: first injection Saturday 3/9/24    - Missed dose mgmt: none       Current SE: none     - Mitigation/Mgmt: none       List Changes to Allergies, Diagnoses: none       # of flares: current    Pain scale: about the same, mornings worse    Rapid3 score(RA only): completed 3/7 with total score 15.6, high activity       Med Rec/Updated drug list:  No changes since last assessment, reviewed with patient.        Additional:    Spoke with Lisbeth briefly to see how first Orencia injection went. She took first injection 3/9/24 and went well with no issues using pen or side effects. No changes in RA, current flare and pain about the same. Aware that it may take 4-6 months to see full effects. Encouraged her to call with any questions or concerns. She was appreciative of check in.      Pharmacist Intervention/Discussion    She asked if she could take meloxicam, ibuprofen, or Tylenol if needed since it may take a bit for the Orencia to kick in. Ran DDI report and let her know no interactions, ok to take as needed but only meloxicam or ibuprofen and not both as they're in the same class and taking both would increase risk for bleeds or GI upset. Fine to take meloxicam or ibuprofen and Tylenol in same day. No renal or hepatic dysfunction noted.

## 2024-03-18 ENCOUNTER — HOSPITAL ENCOUNTER (OUTPATIENT)
Dept: LAB | Facility: MEDICAL CENTER | Age: 29
End: 2024-03-18
Attending: OBSTETRICS & GYNECOLOGY
Payer: MEDICAID

## 2024-03-18 ENCOUNTER — HOSPITAL ENCOUNTER (OUTPATIENT)
Dept: LAB | Facility: MEDICAL CENTER | Age: 29
End: 2024-03-18
Attending: INTERNAL MEDICINE
Payer: MEDICAID

## 2024-03-18 DIAGNOSIS — Z13.220 SCREENING, LIPID: ICD-10-CM

## 2024-03-18 DIAGNOSIS — D50.0 IRON DEFICIENCY ANEMIA DUE TO CHRONIC BLOOD LOSS: ICD-10-CM

## 2024-03-18 DIAGNOSIS — Z13.1 SCREENING FOR DIABETES MELLITUS: ICD-10-CM

## 2024-03-18 DIAGNOSIS — R79.89 ELEVATED SERUM HCG: ICD-10-CM

## 2024-03-18 LAB
B-HCG SERPL-ACNC: 3.5 MIU/ML (ref 0–5)
EST. AVERAGE GLUCOSE BLD GHB EST-MCNC: 117 MG/DL
HBA1C MFR BLD: 5.7 % (ref 4–5.6)

## 2024-03-18 PROCEDURE — 84702 CHORIONIC GONADOTROPIN TEST: CPT

## 2024-03-18 PROCEDURE — 36415 COLL VENOUS BLD VENIPUNCTURE: CPT

## 2024-03-18 PROCEDURE — 83036 HEMOGLOBIN GLYCOSYLATED A1C: CPT

## 2024-03-19 DIAGNOSIS — M05.79 RHEUMATOID ARTHRITIS INVOLVING MULTIPLE SITES WITH POSITIVE RHEUMATOID FACTOR (HCC): ICD-10-CM

## 2024-03-19 PROBLEM — R73.03 PREDIABETES: Status: ACTIVE | Noted: 2024-03-19

## 2024-03-19 PROBLEM — R73.09 ELEVATED GLUCOSE: Status: RESOLVED | Noted: 2024-02-22 | Resolved: 2024-03-19

## 2024-03-19 RX ORDER — METHYLPREDNISOLONE 4 MG/1
TABLET ORAL
Qty: 21 TABLET | Refills: 0 | Status: SHIPPED | OUTPATIENT
Start: 2024-03-19

## 2024-04-03 ENCOUNTER — TELEPHONE (OUTPATIENT)
Dept: PHARMACY | Facility: MEDICAL CENTER | Age: 29
End: 2024-04-03
Payer: MEDICAID

## 2024-04-03 PROCEDURE — RXMED WILLOW AMBULATORY MEDICATION CHARGE: Performed by: INTERNAL MEDICINE

## 2024-04-03 NOTE — TELEPHONE ENCOUNTER
Contact:      Phone number: [phone], Mobile    Name of person spoken with and relationship to patient: Lisbeth Hernández, Self   Patient’s Adherence:      How patient is doing on medication: Good    How many missed doses and reason: 0    Any new medications: No    Any new conditions: No    Any new allergies: No    Any new side effects: No    Any new diagnoses: No    How many doses remainin    Did patient want to speak with pharmacist: Yes, sent email to clinical [facility]  Delivery:      Delivery date and method:  via     Needs by Date:     Signature required: No    Any additional details for : None   Teach Appointment Date: N/A  Shipping Address: [address] [place] [address]  Medication (name, strength and dose): Orencia ClickJect 125mg/mL Soaj  Copay: $0/28ds  Payment Method: N/A, $0 copay   Supplies: Alcohol Swabs & large sharps container  Additional Information: Pt reports not seeing any benefit from the medication at this time but understands that it could take 6-8 weeks for results. She reports currently having the flu and wanted to speak to a pharmacist to see if it is okay to continue taking Orencia or if she should hold her doses until she feels better. Next call date: .

## 2024-04-04 ENCOUNTER — DOCUMENTATION (OUTPATIENT)
Dept: PHARMACY | Facility: MEDICAL CENTER | Age: 29
End: 2024-04-04
Payer: MEDICAID

## 2024-04-04 NOTE — PROGRESS NOTES
===PHARMACIST INTERVENTION ===    Reached out to Lisbeth per request during refill call regarding her Orencia doses. Informed me she was diagnosed with influenza B and prescribed Tamiflu on 4/2/24. Since starting Tamiflu she's been improving - no longer having body aches or fever but still has a cough. Today she questions if it's appropriate to continue her Orencia dose on Saturday (4/6) or to skip. Advised at this time for her to skip her dose due on 4/6 and resume the following week (4/13) to allow her to finish her Tamiflu and fully recover. Thanked me for the directives, no additional questions/concerns at this time.

## 2024-04-05 ENCOUNTER — PHARMACY VISIT (OUTPATIENT)
Dept: PHARMACY | Facility: MEDICAL CENTER | Age: 29
End: 2024-04-05
Payer: COMMERCIAL

## 2024-04-29 ENCOUNTER — TELEPHONE (OUTPATIENT)
Dept: PHARMACY | Facility: MEDICAL CENTER | Age: 29
End: 2024-04-29
Payer: MEDICAID

## 2024-04-29 PROCEDURE — RXMED WILLOW AMBULATORY MEDICATION CHARGE: Performed by: INTERNAL MEDICINE

## 2024-04-29 NOTE — TELEPHONE ENCOUNTER
Contact:      Phone number: [phone], Mobile    Name of person spoken with and relationship to patient: Lisbeth Hernández, Self   Patient’s Adherence:      How patient is doing on medication: Good    How many missed doses and reason: 1- WAS SICK WITH THE FLU    Any new medications: No    Any new conditions: No    Any new allergies: No    Any new side effects: YES- LAS WEEK SHE STARTED TO HAVE PAIN IN HER HANDS    Any new diagnoses: No    How many doses remainin    Did patient want to speak with pharmacist: NO  Delivery:      Delivery date and method:  via     Needs by Date:     Signature required: No    Any additional details for : MAY LEAVE AT THE DOOR  Teach Appointment Date: N/A  Shipping Address: [address] [place] [address]  Medication (name, strength and dose): Orencia ClickJect 125mg/mL Soaj  Copay: $0/28ds  Payment Method: N/A, $0 copay   Supplies:   Additional Information: DOES NOT NEED TO SPEAK WITH A PHARMACIST NO QUESTIONS OR CONCERNS AT THIS TIME

## 2024-05-02 ENCOUNTER — PHARMACY VISIT (OUTPATIENT)
Dept: PHARMACY | Facility: MEDICAL CENTER | Age: 29
End: 2024-05-02
Payer: COMMERCIAL

## 2024-05-09 ENCOUNTER — APPOINTMENT (OUTPATIENT)
Dept: MEDICAL GROUP | Facility: MEDICAL CENTER | Age: 29
End: 2024-05-09
Attending: INTERNAL MEDICINE
Payer: MEDICAID

## 2024-05-30 ENCOUNTER — TELEPHONE (OUTPATIENT)
Dept: PHARMACY | Facility: MEDICAL CENTER | Age: 29
End: 2024-05-30
Payer: MEDICAID

## 2024-05-30 DIAGNOSIS — M05.79 RHEUMATOID ARTHRITIS INVOLVING MULTIPLE SITES WITH POSITIVE RHEUMATOID FACTOR (HCC): ICD-10-CM

## 2024-05-30 PROCEDURE — RXMED WILLOW AMBULATORY MEDICATION CHARGE: Performed by: INTERNAL MEDICINE

## 2024-05-30 RX ORDER — METHYLPREDNISOLONE 4 MG/1
TABLET ORAL
Qty: 21 TABLET | Refills: 0 | Status: SHIPPED | OUTPATIENT
Start: 2024-05-30

## 2024-05-31 NOTE — TELEPHONE ENCOUNTER
Contact:      Phone number: [phone], Mobile    Name of person spoken with and relationship to patient: Lisbeth Hernández, Self   Patient’s Adherence:      How patient is doing on medication: Good    How many missed doses and reason: No    Any new medications: No    Any new conditions: No    Any new allergies: No    Any new side effects: No    Any new diagnoses: No    How many doses remainin    Did patient want to speak with pharmacist: NO  Delivery:      Delivery date and method:  via     Needs by Date:     Signature required: No    Any additional details for : MAY LEAVE AT THE DOOR  Teach Appointment Date: N/A  Shipping Address: [address] [place] [id number] ([employer])  Medication (name, strength and dose): Orencia ClickJect 125mg/mL Soaj  Copay: $0/28ds  Payment Method: N/A, $0 copay   Supplies:  ALCOHOL SWABS  Additional Information: DOES NOT NEED TO SPEAK WITH A PHARMACIST NO QUESTIONS OR CONCERNS AT THIS TIME

## 2024-06-03 ENCOUNTER — PHARMACY VISIT (OUTPATIENT)
Dept: PHARMACY | Facility: MEDICAL CENTER | Age: 29
End: 2024-06-03
Payer: COMMERCIAL

## 2024-06-05 ENCOUNTER — HOSPITAL ENCOUNTER (OUTPATIENT)
Dept: LAB | Facility: MEDICAL CENTER | Age: 29
End: 2024-06-05
Attending: INTERNAL MEDICINE
Payer: MEDICAID

## 2024-06-05 ENCOUNTER — HOSPITAL ENCOUNTER (OUTPATIENT)
Dept: LAB | Facility: MEDICAL CENTER | Age: 29
End: 2024-06-05
Attending: NURSE PRACTITIONER
Payer: MEDICAID

## 2024-06-05 DIAGNOSIS — M05.79 RHEUMATOID ARTHRITIS INVOLVING MULTIPLE SITES WITH POSITIVE RHEUMATOID FACTOR (HCC): ICD-10-CM

## 2024-06-05 DIAGNOSIS — K75.4 AUTOIMMUNE HEPATITIS (HCC): ICD-10-CM

## 2024-06-05 DIAGNOSIS — R73.9 HYPERGLYCEMIA: ICD-10-CM

## 2024-06-05 LAB
ALBUMIN SERPL BCP-MCNC: 3.9 G/DL (ref 3.2–4.9)
ALBUMIN/GLOB SERPL: 1.3 G/DL
ALP SERPL-CCNC: 59 U/L (ref 30–99)
ALT SERPL-CCNC: 24 U/L (ref 2–50)
ANION GAP SERPL CALC-SCNC: 13 MMOL/L (ref 7–16)
AST SERPL-CCNC: 16 U/L (ref 12–45)
BASOPHILS # BLD AUTO: 0.5 % (ref 0–1.8)
BASOPHILS # BLD: 0.05 K/UL (ref 0–0.12)
BILIRUB SERPL-MCNC: 0.2 MG/DL (ref 0.1–1.5)
BUN SERPL-MCNC: 14 MG/DL (ref 8–22)
CALCIUM ALBUM COR SERPL-MCNC: 8.9 MG/DL (ref 8.5–10.5)
CALCIUM SERPL-MCNC: 8.8 MG/DL (ref 8.5–10.5)
CHLORIDE SERPL-SCNC: 104 MMOL/L (ref 96–112)
CO2 SERPL-SCNC: 22 MMOL/L (ref 20–33)
CREAT SERPL-MCNC: 0.58 MG/DL (ref 0.5–1.4)
EOSINOPHIL # BLD AUTO: 0.14 K/UL (ref 0–0.51)
EOSINOPHIL NFR BLD: 1.3 % (ref 0–6.9)
ERYTHROCYTE [DISTWIDTH] IN BLOOD BY AUTOMATED COUNT: 45.1 FL (ref 35.9–50)
ERYTHROCYTE [SEDIMENTATION RATE] IN BLOOD BY WESTERGREN METHOD: 16 MM/HOUR (ref 0–25)
GFR SERPLBLD CREATININE-BSD FMLA CKD-EPI: 126 ML/MIN/1.73 M 2
GLOBULIN SER CALC-MCNC: 2.9 G/DL (ref 1.9–3.5)
GLUCOSE SERPL-MCNC: 86 MG/DL (ref 65–99)
HCT VFR BLD AUTO: 38.5 % (ref 37–47)
HGB BLD-MCNC: 12.1 G/DL (ref 12–16)
IMM GRANULOCYTES # BLD AUTO: 0.06 K/UL (ref 0–0.11)
IMM GRANULOCYTES NFR BLD AUTO: 0.6 % (ref 0–0.9)
LYMPHOCYTES # BLD AUTO: 3.88 K/UL (ref 1–4.8)
LYMPHOCYTES NFR BLD: 36.9 % (ref 22–41)
MCH RBC QN AUTO: 24.5 PG (ref 27–33)
MCHC RBC AUTO-ENTMCNC: 31.4 G/DL (ref 32.2–35.5)
MCV RBC AUTO: 77.9 FL (ref 81.4–97.8)
MONOCYTES # BLD AUTO: 0.63 K/UL (ref 0–0.85)
MONOCYTES NFR BLD AUTO: 6 % (ref 0–13.4)
NEUTROPHILS # BLD AUTO: 5.75 K/UL (ref 1.82–7.42)
NEUTROPHILS NFR BLD: 54.7 % (ref 44–72)
NRBC # BLD AUTO: 0 K/UL
NRBC BLD-RTO: 0 /100 WBC (ref 0–0.2)
PLATELET # BLD AUTO: 452 K/UL (ref 164–446)
PMV BLD AUTO: 10.9 FL (ref 9–12.9)
POTASSIUM SERPL-SCNC: 3.9 MMOL/L (ref 3.6–5.5)
PROT SERPL-MCNC: 6.8 G/DL (ref 6–8.2)
RBC # BLD AUTO: 4.94 M/UL (ref 4.2–5.4)
SODIUM SERPL-SCNC: 139 MMOL/L (ref 135–145)
WBC # BLD AUTO: 10.5 K/UL (ref 4.8–10.8)

## 2024-06-05 PROCEDURE — 86015 ACTIN ANTIBODY EACH: CPT

## 2024-06-05 PROCEDURE — 80053 COMPREHEN METABOLIC PANEL: CPT

## 2024-06-05 PROCEDURE — 85025 COMPLETE CBC W/AUTO DIFF WBC: CPT

## 2024-06-05 PROCEDURE — 86256 FLUORESCENT ANTIBODY TITER: CPT

## 2024-06-05 PROCEDURE — 85652 RBC SED RATE AUTOMATED: CPT

## 2024-06-05 PROCEDURE — 86038 ANTINUCLEAR ANTIBODIES: CPT

## 2024-06-06 ENCOUNTER — OFFICE VISIT (OUTPATIENT)
Dept: RHEUMATOLOGY | Facility: MEDICAL CENTER | Age: 29
End: 2024-06-06
Attending: INTERNAL MEDICINE
Payer: MEDICAID

## 2024-06-06 VITALS
BODY MASS INDEX: 31.64 KG/M2 | TEMPERATURE: 97.1 F | OXYGEN SATURATION: 96 % | WEIGHT: 202 LBS | DIASTOLIC BLOOD PRESSURE: 60 MMHG | SYSTOLIC BLOOD PRESSURE: 102 MMHG | HEART RATE: 90 BPM | RESPIRATION RATE: 14 BRPM

## 2024-06-06 DIAGNOSIS — K75.4 AUTOIMMUNE HEPATITIS (HCC): ICD-10-CM

## 2024-06-06 DIAGNOSIS — M05.79 RHEUMATOID ARTHRITIS INVOLVING MULTIPLE SITES WITH POSITIVE RHEUMATOID FACTOR (HCC): ICD-10-CM

## 2024-06-06 DIAGNOSIS — Z51.81 MEDICATION MONITORING ENCOUNTER: ICD-10-CM

## 2024-06-06 DIAGNOSIS — R73.9 HYPERGLYCEMIA: ICD-10-CM

## 2024-06-06 DIAGNOSIS — Z79.1 NSAID LONG-TERM USE: ICD-10-CM

## 2024-06-06 LAB — NUCLEAR IGG SER QL IA: NORMAL

## 2024-06-06 PROCEDURE — 99214 OFFICE O/P EST MOD 30 MIN: CPT | Performed by: INTERNAL MEDICINE

## 2024-06-06 PROCEDURE — 3078F DIAST BP <80 MM HG: CPT | Performed by: INTERNAL MEDICINE

## 2024-06-06 PROCEDURE — 99212 OFFICE O/P EST SF 10 MIN: CPT | Performed by: INTERNAL MEDICINE

## 2024-06-06 PROCEDURE — 3074F SYST BP LT 130 MM HG: CPT | Performed by: INTERNAL MEDICINE

## 2024-06-06 RX ORDER — METRONIDAZOLE 500 MG/1
TABLET ORAL
COMMUNITY
Start: 2024-05-31

## 2024-06-06 RX ORDER — PANTOPRAZOLE SODIUM 40 MG/1
TABLET, DELAYED RELEASE ORAL
COMMUNITY
Start: 2024-05-08

## 2024-06-06 RX ORDER — SULINDAC 200 MG/1
TABLET ORAL
Qty: 180 TABLET | Refills: 1 | Status: SHIPPED | OUTPATIENT
Start: 2024-06-06

## 2024-06-06 RX ORDER — DESOGESTREL AND ETHINYL ESTRADIOL 0.15-0.03
1 KIT ORAL
Qty: 28 TABLET | Refills: 11 | Status: SHIPPED | OUTPATIENT
Start: 2024-06-06

## 2024-06-06 RX ORDER — FLUCONAZOLE 150 MG/1
TABLET ORAL
COMMUNITY
Start: 2024-06-04

## 2024-06-06 ASSESSMENT — JOINT PAIN
TOTAL NUMBER OF TENDER JOINTS: 6
TOTAL NUMBER OF TENDER JOINTS: 5
TOTAL NUMBER OF SWOLLEN JOINTS: 0

## 2024-06-06 ASSESSMENT — FIBROSIS 4 INDEX: FIB4 SCORE: 0.21

## 2024-06-06 NOTE — PROGRESS NOTES
Chief Complaint- joint pain     Subjective:   Lisbeth Hernández is a 29 y.o. female here today for follow up of rheumatological issues      This is a follow-up visit for this patient who is seen in this clinic for serologically positive rheumatoid arthritis.  Patient is currently on Orencia 125 mg subcu every week.  Patient states that she still feeling achy and actually had a flare requiring steroids since last visit.  Patient states that the achiness rotates, states sometimes in her hips sometimes her shoulder sometimes hands, states that she feels there was some swelling at some point as well.  Of note recent sedimentation rate equals 16 June 2024.    Complicating factors is that patient is also getting evaluated for autoimmune hepatitis with a positive F-actin November 2023.  Patient has had problems with elevated liver functions in the past and is reluctant to go on any medications that may exacerbate liver issues.    Patient also diagnosed with iron deficient anemia with an MCV level at 77.  Of note last iron level done October 2023 was normal.       PMHx  Iron deficient anemia  Rhinoplasty at 16 years old  Hx of saliva glands removed at 16 years old in [place]     Fam Hx   M-bipolar  F-hypercholesterol  Bx3 A/W  Aunt-DGm-RA     Soc Hx  Pos tob quit 2016  Positive vaping  No MJ  ETOH q month  Positive tattoos  No blood tx       S/p meloxicam-ineffective  S/p celebrex-contraindicated as patient is allergic to sulfa medications  s/p Cimzia-as Cimzia is TNF inhibitor, it exacerbated patient's autoimmune hepatitis   S/p plaquenil-N/V diarrhea  S/p sulfasalazine-strong family hx of sulfa allergy, patient hesitant   S/p MTX-did not try for fear of exacerbating autoimmune hepatitis  S/p arava-did not try for fear of exacerbating autoimmune hepatitis     F-Actin 26 11/2023  Smooth muscle neg 11/2023  AMA neg 11/2023  HAV neg 11/2023  Notified patient, refer to GI/liver for evaluation     G6PD 17.6 adequate 8/2023  SSA  neg 8/2023  SSB neg 8/2023  HBsAg/HBcAb neg 8/2023  HCV neg 8/2023  Quantiferon Gold neg 8/2023      5/2023  CCP neg 5/2023  HCV neg 3/2022  Hand x-rays 5/2023-IMPRESSION:  No evidence of fracture or arthropathy.     RUQ Ultrasound 12/2023-IMPRESSION:  1.  Hepatomegaly with evidence of hepatic steatosis.           Current Outpatient Medications   Medication Sig Dispense Refill    metroNIDAZOLE (FLAGYL) 500 MG Tab TAKE 1 TABLET BY MOUTH EVERY 12 HOURS FOR 7 DAYS MAY TAKE WITH FOOD TO MINIMIZE ABDOMINAL DISCOMFORT      fluconazole (DIFLUCAN) 150 MG tablet       pantoprazole (PROTONIX) 40 MG Tablet Delayed Response TAKE 1 TABLET BY MOUTH ONCE A DAY 30 MINUTES BEFORE BREAKFAST MEAL      sulindac (CLINORIL) 200 MG Tab 1 tab po bid with food prn joint pain 180 Tablet 1    Abatacept (ORENCIA CLICKJECT) 125 MG/ML Solution Auto-injector Inject 125 mg subcutaneously every 7 days 12 mL 1    ENSKYCE 0.15-30 MG-MCG per tablet Take 1 Tablet by mouth every day.      ferrous sulfate 325 (65 Fe) MG tablet Take 1 Tablet by mouth every day. (Patient not taking: Reported on 6/6/2024) 30 Tablet 1    dicyclomine (BENTYL) 20 MG Tab Take 1 Tablet by mouth every 6 hours as needed (vomiting). 20 Tablet 0    ondansetron (ZOFRAN ODT) 4 MG TABLET DISPERSIBLE Take 1 Tablet by mouth every 8 hours as needed for Nausea/Vomiting. 20 Tablet 0     No current facility-administered medications for this visit.     She  has a past medical history of Cyclothymia and Iron deficiency anemia due to chronic blood loss (6/18/2020).    ROS   Other than what is mentioned in HPI or physical exam, there is no history of headaches, double vision or blurred vision.  No trouble swallowing difficulties .  No chest complaints including chest pain, cough or sputum production. No GI complaints including nausea, vomiting, change in bowel habits, or past peptic ulcer disease. No history of blood in the stools. No urinary complaints including dysuria or frequency. No  history of alopecia, photosensitivity     Objective:     /60   Pulse 90   Temp 36.2 °C (97.1 °F) (Temporal)   Resp 14   Wt 91.6 kg (202 lb)   SpO2 96%  Body mass index is 31.64 kg/m².   Physical Exam:    Constitutional: Alert and oriented X3, patient is talkative with good eye contact.Skin: Warm, dry, good turgor, no rashes in visible areas.Eye: Equal, round and reactive, conjunctiva clear, lids normal EOM intactENMT: Lips without lesions,  pinna without deformityNeck: Trachea midline, no masses, no thyromegaly.Lymph:  No cervical lymphadenopathy, no axillary lymphadenopathy, no supraclavicular lymphadenopathyRespiratory: Unlabored respiratory effort, lungs clear to auscultation, no wheezes, no ronchi.Cardiovascular: Normal S1, S2, Regular rate and rhythm, no murmurs rubs or gallops  .Abdomen: Soft, non-distended.Psych: Alert and oriented x3, normal affect and mood.Neuro: Cranial nerves 2-12 are grossly intact Ext:no joint laxity noted in bilateral arms, no joint laxity noted in bilateral legs, no adam synovitis, no flexion contractures, no rheumatoid arthritis changes noted        Lab Results   Component Value Date/Time    HEPBCORIGM Non-Reactive 08/18/2023 07:02 AM    HEPBSAG Non-Reactive 08/18/2023 07:02 AM     Lab Results   Component Value Date/Time    HEPCAB Non-Reactive 08/18/2023 07:02 AM     Lab Results   Component Value Date/Time    SODIUM 139 06/05/2024 06:54 AM    POTASSIUM 3.9 06/05/2024 06:54 AM    CHLORIDE 104 06/05/2024 06:54 AM    CO2 22 06/05/2024 06:54 AM    GLUCOSE 86 06/05/2024 06:54 AM    BUN 14 06/05/2024 06:54 AM    CREATININE 0.58 06/05/2024 06:54 AM      Lab Results   Component Value Date/Time    WBC 10.5 06/05/2024 06:54 AM    RBC 4.94 06/05/2024 06:54 AM    HEMOGLOBIN 12.1 06/05/2024 06:54 AM    HEMATOCRIT 38.5 06/05/2024 06:54 AM    MCV 77.9 (L) 06/05/2024 06:54 AM    MCH 24.5 (L) 06/05/2024 06:54 AM    MCHC 31.4 (L) 06/05/2024 06:54 AM    MPV 10.9 06/05/2024 06:54 AM     NEUTSPOLYS 54.70 06/05/2024 06:54 AM    LYMPHOCYTES 36.90 06/05/2024 06:54 AM    MONOCYTES 6.00 06/05/2024 06:54 AM    EOSINOPHILS 1.30 06/05/2024 06:54 AM    BASOPHILS 0.50 06/05/2024 06:54 AM      Lab Results   Component Value Date/Time    CALCIUM 8.8 06/05/2024 06:54 AM    ASTSGOT 16 06/05/2024 06:54 AM    ALTSGPT 24 06/05/2024 06:54 AM    ALKPHOSPHAT 59 06/05/2024 06:54 AM    TBILIRUBIN 0.2 06/05/2024 06:54 AM    ALBUMIN 3.9 06/05/2024 06:54 AM    TOTPROTEIN 6.8 06/05/2024 06:54 AM     Lab Results   Component Value Date/Time    URICACID 4.2 05/19/2023 07:19 AM    RHEUMFACTN 100 (H) 05/19/2023 07:19 AM    CCPANTIBODY 10 05/19/2023 07:19 AM    ANTINUCAB None Detected 05/19/2023 07:19 AM     Lab Results   Component Value Date/Time    ANTISSBSJ 1 08/18/2023 07:02 AM     Lab Results   Component Value Date/Time    SEDRATEWES 16 06/05/2024 06:54 AM     Lab Results   Component Value Date/Time    HBA1C 5.7 (H) 03/18/2024 03:56 PM     Lab Results   Component Value Date/Time    G6PD 17.6 (H) 08/18/2023 07:02 AM     Lab Results   Component Value Date/Time    ANTIMITOCHO 5.8 11/30/2023 08:28 AM    FACTIN 26 (H) 11/30/2023 08:28 AM     Assessment and Plan:     1. Rheumatoid arthritis involving multiple sites with positive rheumatoid factor (HCC)  Long discussion with patient regarding treatment options, patient states that she would like to stay at the Orencia injections 125 mg subcu every week for now, but if patient continues to have problems then consider switching to Actemra although we will have to monitor liver functions more closely or possibly rituximab.  - IRON/TOTAL IRON BIND; Future  - HEMOGLOBIN EVALUATION REFLEX; Future  - sulindac (CLINORIL) 200 MG Tab; 1 tab po bid with food prn joint pain  Dispense: 180 Tablet; Refill: 1    2. Medication monitoring encounter  Currently on Orencia injections 125 mg subcu every week, screening labs are up-to-date, neck screening labs due August 2025, patient needs monitoring  labs every 6 months, next labs due December 2024, will order at next visit  We reviewed risks of biological medications with patient including hematological pathology, cancer risks, neurological and infection issues, patient states understanding.    3. NSAID long-term use  Do a trial of sulindac 200 mg p.o. twice daily with food as needed, patient will need monitoring labs every 6 months, next labs due December 2024, will order at next visit  - IRON/TOTAL IRON BIND; Future  - HEMOGLOBIN EVALUATION REFLEX; Future  - sulindac (CLINORIL) 200 MG Tab; 1 tab po bid with food prn joint pain  Dispense: 180 Tablet; Refill: 1    4. Autoimmune hepatitis (HCC)  With a positive F-actin, currently getting evaluated by GI  - IRON/TOTAL IRON BIND; Future  - HEMOGLOBIN EVALUATION REFLEX; Future  - sulindac (CLINORIL) 200 MG Tab; 1 tab po bid with food prn joint pain  Dispense: 180 Tablet; Refill: 1    5. Hyperglycemia  Followed by patients PCP, high blood sugar can exacerbate inflammatory state of patient's arthritis, discussed with patient the need to monitor and control blood sugars, patient states understanding  - IRON/TOTAL IRON BIND; Future  - HEMOGLOBIN EVALUATION REFLEX; Future  - sulindac (CLINORIL) 200 MG Tab; 1 tab po bid with food prn joint pain  Dispense: 180 Tablet; Refill: 1    6. Anemia  Felt to be iron deficient, MCV low at 77, last iron levels done October 2023 in normal range, today recheck iron level, also check hemoglobin electrophoresis for possible thalassemia.    Followup: Return in about 3 months (around 9/6/2024). or sooner prn      Please note that this dictation was created using voice recognition software. I have made every reasonable attempt to correct obvious errors, but I expect that there are errors of grammar and possibly content that I did not discover before finalizing the note.

## 2024-06-06 NOTE — TELEPHONE ENCOUNTER
Received request via: Pharmacy    Was the patient seen in the last year in this department? Yes    Does the patient have an active prescription (recently filled or refills available) for medication(s) requested? No    [facility]    Does the patient have [facility] and need 100 day supply (blood pressure, diabetes and cholesterol meds only)? Patient does not have [facility]    Future Appointments         Provider Department [place]    6/19/2024 7:20 AM (Arrive by 7:05 AM) Eugenia Agudelo M.D. [facility]    9/17/2024 8:15 AM (Arrive by 8:00 AM) Felisha Penn M.D. [facility]

## 2024-06-07 LAB
SMA IGG SER-ACNC: 37 UNITS (ref 0–19)
SMOOTH MUSCLE IGG TITR SER: ABNORMAL {TITER}

## 2024-06-24 PROCEDURE — RXMED WILLOW AMBULATORY MEDICATION CHARGE: Performed by: INTERNAL MEDICINE

## 2024-06-27 ENCOUNTER — PHARMACY VISIT (OUTPATIENT)
Dept: PHARMACY | Facility: MEDICAL CENTER | Age: 29
End: 2024-06-27
Payer: COMMERCIAL

## 2024-07-02 ENCOUNTER — TELEPHONE (OUTPATIENT)
Dept: RHEUMATOLOGY | Facility: MEDICAL CENTER | Age: 29
End: 2024-07-02
Payer: MEDICAID

## 2024-07-02 DIAGNOSIS — M05.79 RHEUMATOID ARTHRITIS INVOLVING MULTIPLE SITES WITH POSITIVE RHEUMATOID FACTOR (HCC): ICD-10-CM

## 2024-07-02 RX ORDER — METHYLPREDNISOLONE 4 MG/1
TABLET ORAL
Qty: 21 TABLET | Refills: 0 | Status: SHIPPED | OUTPATIENT
Start: 2024-07-02 | End: 2024-07-15

## 2024-07-15 ENCOUNTER — OFFICE VISIT (OUTPATIENT)
Dept: RHEUMATOLOGY | Facility: MEDICAL CENTER | Age: 29
End: 2024-07-15
Attending: INTERNAL MEDICINE
Payer: MEDICAID

## 2024-07-15 VITALS
WEIGHT: 196 LBS | OXYGEN SATURATION: 97 % | HEART RATE: 81 BPM | RESPIRATION RATE: 14 BRPM | BODY MASS INDEX: 30.7 KG/M2 | SYSTOLIC BLOOD PRESSURE: 112 MMHG | TEMPERATURE: 97.8 F | DIASTOLIC BLOOD PRESSURE: 70 MMHG

## 2024-07-15 DIAGNOSIS — Z51.81 MEDICATION MONITORING ENCOUNTER: ICD-10-CM

## 2024-07-15 DIAGNOSIS — M05.79 RHEUMATOID ARTHRITIS INVOLVING MULTIPLE SITES WITH POSITIVE RHEUMATOID FACTOR (HCC): ICD-10-CM

## 2024-07-15 DIAGNOSIS — G56.03 BILATERAL CARPAL TUNNEL SYNDROME: ICD-10-CM

## 2024-07-15 DIAGNOSIS — R73.9 HYPERGLYCEMIA: ICD-10-CM

## 2024-07-15 DIAGNOSIS — Z79.1 NSAID LONG-TERM USE: ICD-10-CM

## 2024-07-15 DIAGNOSIS — K75.4 AUTOIMMUNE HEPATITIS (HCC): ICD-10-CM

## 2024-07-15 PROCEDURE — 99212 OFFICE O/P EST SF 10 MIN: CPT | Performed by: INTERNAL MEDICINE

## 2024-07-15 PROCEDURE — 99214 OFFICE O/P EST MOD 30 MIN: CPT | Performed by: INTERNAL MEDICINE

## 2024-07-15 PROCEDURE — 3078F DIAST BP <80 MM HG: CPT | Performed by: INTERNAL MEDICINE

## 2024-07-15 PROCEDURE — 3074F SYST BP LT 130 MM HG: CPT | Performed by: INTERNAL MEDICINE

## 2024-07-15 RX ORDER — TOCILIZUMAB 180 MG/ML
INJECTION, SOLUTION SUBCUTANEOUS
Qty: 10.8 ML | Refills: 1 | Status: SHIPPED | OUTPATIENT
Start: 2024-07-15 | End: 2024-07-18

## 2024-07-15 RX ORDER — NABUMETONE 500 MG/1
TABLET, FILM COATED ORAL
Qty: 360 TABLET | Refills: 0 | Status: SHIPPED | OUTPATIENT
Start: 2024-07-15

## 2024-07-15 ASSESSMENT — FIBROSIS 4 INDEX: FIB4 SCORE: 0.21

## 2024-07-16 ENCOUNTER — TELEPHONE (OUTPATIENT)
Dept: RHEUMATOLOGY | Facility: MEDICAL CENTER | Age: 29
End: 2024-07-16
Payer: MEDICAID

## 2024-07-18 ENCOUNTER — TELEPHONE (OUTPATIENT)
Dept: RHEUMATOLOGY | Facility: MEDICAL CENTER | Age: 29
End: 2024-07-18
Payer: MEDICAID

## 2024-07-18 DIAGNOSIS — Z51.81 MEDICATION MONITORING ENCOUNTER: ICD-10-CM

## 2024-07-18 DIAGNOSIS — M05.79 RHEUMATOID ARTHRITIS INVOLVING MULTIPLE SITES WITH POSITIVE RHEUMATOID FACTOR (HCC): ICD-10-CM

## 2024-07-18 RX ORDER — MEDROXYPROGESTERONE ACETATE 150 MG/ML
INJECTION, SUSPENSION INTRAMUSCULAR
Qty: 12 ML | Refills: 1 | Status: SHIPPED | OUTPATIENT
Start: 2024-07-18

## 2024-07-19 PROCEDURE — RXMED WILLOW AMBULATORY MEDICATION CHARGE: Performed by: INTERNAL MEDICINE

## 2024-07-22 ENCOUNTER — PHARMACY VISIT (OUTPATIENT)
Dept: PHARMACY | Facility: MEDICAL CENTER | Age: 29
End: 2024-07-22
Payer: COMMERCIAL

## 2024-07-23 ENCOUNTER — DOCUMENTATION (OUTPATIENT)
Dept: PHARMACY | Facility: MEDICAL CENTER | Age: 29
End: 2024-07-23
Payer: MEDICAID

## 2024-08-01 ENCOUNTER — DOCUMENTATION (OUTPATIENT)
Dept: PHARMACY | Facility: MEDICAL CENTER | Age: 29
End: 2024-08-01
Payer: MEDICAID

## 2024-08-01 NOTE — PROGRESS NOTES
First Cycle   Dx: Rheumatoid arthritis involving multiple sites with positive rheumatoid factor [M05.79]       Txt review: Enbrel SureClick 50mg/mL pen injecting 1mL (50mg) under the skin every 7 days    Administration: self-injecting Enbrel #1 pen under the skin weekly; no reported challenges with pen administration; rotating injection sites at her right thigh  Adherence: 1 day late with injection due 7/31; see below for further discussion    Missed dose mgmt: completed injection morning of 8/1; see below for further discussion   Current SE: none     Mitigation/Mgmt: N/A as no SE reported      List Changes to Allergies, Diagnoses: none       # of flares: 0  Pain scale (avg last week): 2.5/10  Rapid3 score (RA only): completed during initial; too soon to reassess       Med Rec/Updated drug list:   No changes since last assessment, reviewed with patient.     Additional: I reached Lisbeth for first cycle check-in regarding her recent start of Enbrel. She confirmed starting on 7/24/24 which was in place of [facility]. She reported no challenges with device administration and had no reported side effects. Reported pain is down from last week of 5/10 to present 2.5/10. She noted no changes with her health and had no further questions.    **Pharmacist discussion**  Lisbeth noted she forgot to complete her weekly Enbrel injection due 7/31. She administered late injection the morning of 8/1/24. Reviewed if she does miss to administer ASAP then resume normal dosing schedule. She is comfortable continuing to inject every Thursday moving forward. Recommended use of phone alarm/alerts to remind her to inject every week. She acknowledged she does use alerts however has not yet set her alerts. She confirmed she would set alerts after our call to help keep her on track with injections.

## 2024-08-13 PROCEDURE — RXMED WILLOW AMBULATORY MEDICATION CHARGE: Performed by: INTERNAL MEDICINE

## 2024-08-14 ENCOUNTER — PHARMACY VISIT (OUTPATIENT)
Dept: PHARMACY | Facility: MEDICAL CENTER | Age: 29
End: 2024-08-14
Payer: COMMERCIAL

## 2024-08-27 ENCOUNTER — HOSPITAL ENCOUNTER (OUTPATIENT)
Facility: MEDICAL CENTER | Age: 29
End: 2024-08-27
Attending: INTERNAL MEDICINE
Payer: MEDICAID

## 2024-08-27 PROCEDURE — 87591 N.GONORRHOEAE DNA AMP PROB: CPT

## 2024-08-27 PROCEDURE — 87660 TRICHOMONAS VAGIN DIR PROBE: CPT

## 2024-08-27 PROCEDURE — 87480 CANDIDA DNA DIR PROBE: CPT

## 2024-08-27 PROCEDURE — 87510 GARDNER VAG DNA DIR PROBE: CPT

## 2024-08-27 PROCEDURE — 87491 CHLMYD TRACH DNA AMP PROBE: CPT

## 2024-08-28 ENCOUNTER — OFFICE VISIT (OUTPATIENT)
Dept: MEDICAL GROUP | Facility: MEDICAL CENTER | Age: 29
End: 2024-08-28
Attending: INTERNAL MEDICINE
Payer: MEDICAID

## 2024-08-28 VITALS
DIASTOLIC BLOOD PRESSURE: 68 MMHG | OXYGEN SATURATION: 97 % | WEIGHT: 207.3 LBS | BODY MASS INDEX: 32.54 KG/M2 | SYSTOLIC BLOOD PRESSURE: 120 MMHG | TEMPERATURE: 97 F | HEART RATE: 61 BPM | HEIGHT: 67 IN | RESPIRATION RATE: 16 BRPM

## 2024-08-28 DIAGNOSIS — N89.8 VAGINAL IRRITATION: ICD-10-CM

## 2024-08-28 DIAGNOSIS — E66.9 OBESITY (BMI 30-39.9): ICD-10-CM

## 2024-08-28 PROCEDURE — 3078F DIAST BP <80 MM HG: CPT | Performed by: INTERNAL MEDICINE

## 2024-08-28 PROCEDURE — 99213 OFFICE O/P EST LOW 20 MIN: CPT | Performed by: INTERNAL MEDICINE

## 2024-08-28 PROCEDURE — 99214 OFFICE O/P EST MOD 30 MIN: CPT | Performed by: INTERNAL MEDICINE

## 2024-08-28 PROCEDURE — 3074F SYST BP LT 130 MM HG: CPT | Performed by: INTERNAL MEDICINE

## 2024-08-28 RX ORDER — PHENTERMINE HYDROCHLORIDE 37.5 MG/1
37.5 TABLET ORAL
Qty: 30 TABLET | Refills: 0 | Status: SHIPPED | OUTPATIENT
Start: 2024-08-28 | End: 2024-09-27

## 2024-08-28 ASSESSMENT — FIBROSIS 4 INDEX: FIB4 SCORE: 0.21

## 2024-08-28 NOTE — PROGRESS NOTES
Subjective:   Lisbeth Hernández is a 29 y.o. female here today for weight management, vaginal symptoms.    Vaginal irritation  Reports persistent vaginal irritation for about 3 weeks.  Initially had some white discharge and thought she had a yeast infection so followed up with on line urgent care and took 2 doses of diflucan.  Sx did not improve except less discharge now.  Skin feels sensitive and has been using aquaphor to protect the skin.  Just ended her period.  Sexually active with one partner (same for 2 years).      Obesity (BMI 30-39.9)  Has had a lot of difficulty with weight loss especially since being diagnosed with RA and needing steroids.  Has tried modifying diet, eating less, and going on walks but not losing weight.  Has taken phentermine in the past which she has tolerated well and was effective (reports 30 lb loss in 3 months); last used in 2022.  Would like to restart.       Current medicines (including changes today)  Current Outpatient Medications   Medication Sig Dispense Refill    phentermine (ADIPEX-P) 37.5 MG tablet Take 1 Tablet by mouth every morning before breakfast for 30 days. 30 Tablet 0    Etanercept (ENBREL SURECLICK) 50 MG/ML Solution Auto-injector Inject 50 mg (1 pen) Subcutaneously every 7 days 12 mL 1    nabumetone (RELAFEN) 500 MG Tab 1-2 tabs po bid with food prn joint pain 360 Tablet 0    ENSKYCE 0.15-30 MG-MCG per tablet TAKE 1 TABLET BY MOUTH EVERY DAY 28 Tablet 11    pantoprazole (PROTONIX) 40 MG Tablet Delayed Response TAKE 1 TABLET BY MOUTH ONCE A DAY 30 MINUTES BEFORE BREAKFAST MEAL       No current facility-administered medications for this visit.     She  has a past medical history of Cyclothymia and Iron deficiency anemia due to chronic blood loss (6/18/2020).         Objective:     Vitals:    08/28/24 1135   BP: 120/68   Pulse: 61   Resp: 16   Temp: 36.1 °C (97 °F)   SpO2: 97%     Body mass index is 32.47 kg/m².   Physical Exam:  Constitutional: Alert, no  distress.  Skin: Warm, dry, good turgor, no rashes in visible areas.  Eye: Equal, round and reactive, conjunctiva clear, lids normal.  Psych: Alert and oriented x3, normal affect and mood.    Assessment and Plan:   The following treatment plan was discussed    1. Vaginal irritation  Vaginal pathogen testing completed, will treat based on results.  If all negative, advised continued barrier protection and would benefit from exam at next visit to make sure no evidence of skin disorder causing sx  - VAGINAL PATHOGENS DNA PANEL; Future  - Chlamydia/GC, PCR (Genital/Anal swab); Future    2. Obesity (BMI 30-39.9)  Will restart phentermine, reviewed side effects.  F/u 4 weeks.  - phentermine (ADIPEX-P) 37.5 MG tablet; Take 1 Tablet by mouth every morning before breakfast for 30 days.  Dispense: 30 Tablet; Refill: 0        Followup: Return in about 4 weeks (around 9/25/2024) for weight loss.

## 2024-08-28 NOTE — ASSESSMENT & PLAN NOTE
Reports persistent vaginal irritation for about 3 weeks.  Initially had some white discharge and thought she had a yeast infection so followed up with on line urgent care and took 2 doses of diflucan.  Sx did not improve except less discharge now.  Skin feels sensitive and has been using aquaphor to protect the skin.  Just ended her period.  Sexually active with one partner (same for 2 years).

## 2024-08-28 NOTE — ASSESSMENT & PLAN NOTE
Has had a lot of difficulty with weight loss especially since being diagnosed with RA and needing steroids.  Has tried modifying diet, eating less, and going on walks but not losing weight.  Has taken phentermine in the past which she has tolerated well and was effective (reports 30 lb loss in 3 months); last used in 2022.  Would like to restart.

## 2024-08-29 DIAGNOSIS — B96.89 BACTERIAL VAGINOSIS: ICD-10-CM

## 2024-08-29 DIAGNOSIS — N76.0 BACTERIAL VAGINOSIS: ICD-10-CM

## 2024-08-29 LAB
C TRACH DNA GENITAL QL NAA+PROBE: NEGATIVE
CANDIDA DNA VAG QL PROBE+SIG AMP: NEGATIVE
G VAGINALIS DNA VAG QL PROBE+SIG AMP: POSITIVE
N GONORRHOEA DNA GENITAL QL NAA+PROBE: NEGATIVE
SPECIMEN SOURCE: NORMAL
T VAGINALIS DNA VAG QL PROBE+SIG AMP: NEGATIVE

## 2024-08-29 RX ORDER — METRONIDAZOLE 500 MG/1
500 TABLET ORAL 2 TIMES DAILY
Qty: 14 TABLET | Refills: 0 | Status: SHIPPED | OUTPATIENT
Start: 2024-08-29 | End: 2024-09-05

## 2024-09-09 PROCEDURE — RXMED WILLOW AMBULATORY MEDICATION CHARGE: Performed by: INTERNAL MEDICINE

## 2024-09-12 ENCOUNTER — PHARMACY VISIT (OUTPATIENT)
Dept: PHARMACY | Facility: MEDICAL CENTER | Age: 29
End: 2024-09-12
Payer: COMMERCIAL

## 2024-09-19 ENCOUNTER — HOSPITAL ENCOUNTER (OUTPATIENT)
Dept: LAB | Facility: MEDICAL CENTER | Age: 29
End: 2024-09-19
Attending: INTERNAL MEDICINE
Payer: MEDICAID

## 2024-09-19 ENCOUNTER — HOSPITAL ENCOUNTER (OUTPATIENT)
Dept: LAB | Facility: MEDICAL CENTER | Age: 29
End: 2024-09-19
Attending: NURSE PRACTITIONER
Payer: MEDICAID

## 2024-09-19 DIAGNOSIS — Z79.1 NSAID LONG-TERM USE: ICD-10-CM

## 2024-09-19 DIAGNOSIS — M05.79 RHEUMATOID ARTHRITIS INVOLVING MULTIPLE SITES WITH POSITIVE RHEUMATOID FACTOR (HCC): ICD-10-CM

## 2024-09-19 DIAGNOSIS — K75.4 AUTOIMMUNE HEPATITIS (HCC): ICD-10-CM

## 2024-09-19 DIAGNOSIS — R73.9 HYPERGLYCEMIA: ICD-10-CM

## 2024-09-19 DIAGNOSIS — G56.03 BILATERAL CARPAL TUNNEL SYNDROME: ICD-10-CM

## 2024-09-19 DIAGNOSIS — Z51.81 MEDICATION MONITORING ENCOUNTER: ICD-10-CM

## 2024-09-19 LAB
ALBUMIN SERPL BCP-MCNC: 3.9 G/DL (ref 3.2–4.9)
ALBUMIN/GLOB SERPL: 1.3 G/DL
ALP SERPL-CCNC: 62 U/L (ref 30–99)
ALT SERPL-CCNC: 25 U/L (ref 2–50)
ANION GAP SERPL CALC-SCNC: 13 MMOL/L (ref 7–16)
AST SERPL-CCNC: 30 U/L (ref 12–45)
BASOPHILS # BLD AUTO: 0.5 % (ref 0–1.8)
BASOPHILS # BLD: 0.03 K/UL (ref 0–0.12)
BILIRUB SERPL-MCNC: 0.5 MG/DL (ref 0.1–1.5)
BUN SERPL-MCNC: 8 MG/DL (ref 8–22)
CALCIUM ALBUM COR SERPL-MCNC: 8.9 MG/DL (ref 8.5–10.5)
CALCIUM SERPL-MCNC: 8.8 MG/DL (ref 8.5–10.5)
CHLORIDE SERPL-SCNC: 105 MMOL/L (ref 96–112)
CO2 SERPL-SCNC: 20 MMOL/L (ref 20–33)
CREAT SERPL-MCNC: 0.62 MG/DL (ref 0.5–1.4)
EOSINOPHIL # BLD AUTO: 0.12 K/UL (ref 0–0.51)
EOSINOPHIL NFR BLD: 1.9 % (ref 0–6.9)
ERYTHROCYTE [DISTWIDTH] IN BLOOD BY AUTOMATED COUNT: 46.5 FL (ref 35.9–50)
ERYTHROCYTE [SEDIMENTATION RATE] IN BLOOD BY WESTERGREN METHOD: 21 MM/HOUR (ref 0–25)
GFR SERPLBLD CREATININE-BSD FMLA CKD-EPI: 123 ML/MIN/1.73 M 2
GLOBULIN SER CALC-MCNC: 3.1 G/DL (ref 1.9–3.5)
GLUCOSE SERPL-MCNC: 88 MG/DL (ref 65–99)
HCT VFR BLD AUTO: 36.8 % (ref 37–47)
HGB BLD-MCNC: 11.7 G/DL (ref 12–16)
IMM GRANULOCYTES # BLD AUTO: 0.02 K/UL (ref 0–0.11)
IMM GRANULOCYTES NFR BLD AUTO: 0.3 % (ref 0–0.9)
IRON SATN MFR SERPL: 14 % (ref 15–55)
IRON SERPL-MCNC: 63 UG/DL (ref 40–170)
LYMPHOCYTES # BLD AUTO: 1.48 K/UL (ref 1–4.8)
LYMPHOCYTES NFR BLD: 23.6 % (ref 22–41)
MCH RBC QN AUTO: 25.1 PG (ref 27–33)
MCHC RBC AUTO-ENTMCNC: 31.8 G/DL (ref 32.2–35.5)
MCV RBC AUTO: 79 FL (ref 81.4–97.8)
MONOCYTES # BLD AUTO: 0.47 K/UL (ref 0–0.85)
MONOCYTES NFR BLD AUTO: 7.5 % (ref 0–13.4)
NEUTROPHILS # BLD AUTO: 4.15 K/UL (ref 1.82–7.42)
NEUTROPHILS NFR BLD: 66.2 % (ref 44–72)
NRBC # BLD AUTO: 0 K/UL
NRBC BLD-RTO: 0 /100 WBC (ref 0–0.2)
PLATELET # BLD AUTO: 358 K/UL (ref 164–446)
PMV BLD AUTO: 11.5 FL (ref 9–12.9)
POTASSIUM SERPL-SCNC: 3.9 MMOL/L (ref 3.6–5.5)
PROT SERPL-MCNC: 7 G/DL (ref 6–8.2)
RBC # BLD AUTO: 4.66 M/UL (ref 4.2–5.4)
SODIUM SERPL-SCNC: 138 MMOL/L (ref 135–145)
TIBC SERPL-MCNC: 463 UG/DL (ref 250–450)
UIBC SERPL-MCNC: 400 UG/DL (ref 110–370)
WBC # BLD AUTO: 6.3 K/UL (ref 4.8–10.8)

## 2024-09-19 PROCEDURE — 85025 COMPLETE CBC W/AUTO DIFF WBC: CPT

## 2024-09-19 PROCEDURE — 80053 COMPREHEN METABOLIC PANEL: CPT

## 2024-09-19 PROCEDURE — 85652 RBC SED RATE AUTOMATED: CPT

## 2024-09-19 PROCEDURE — 36415 COLL VENOUS BLD VENIPUNCTURE: CPT

## 2024-09-19 PROCEDURE — 83550 IRON BINDING TEST: CPT

## 2024-09-19 PROCEDURE — 83021 HEMOGLOBIN CHROMOTOGRAPHY: CPT

## 2024-09-19 PROCEDURE — 83540 ASSAY OF IRON: CPT

## 2024-09-19 RX ORDER — METHYLPREDNISOLONE 4 MG/1
TABLET ORAL
Qty: 21 TABLET | Refills: 0 | Status: SHIPPED | OUTPATIENT
Start: 2024-09-19 | End: 2024-09-30

## 2024-09-21 LAB
HGB A1 MFR BLD: 96.6 % (ref 95–97.9)
HGB A2 MFR BLD: 2.9 % (ref 2–3.5)
HGB C MFR BLD: 0 % (ref 0–0)
HGB E MFR BLD: 0 % (ref 0–0)
HGB F MFR BLD: 0.5 % (ref 0–2.1)
HGB FRACT BLD ELPH-IMP: NORMAL
HGB OTHER MFR BLD: 0 % (ref 0–0)
HGB S BLD QL SOLY: NORMAL
HGB S MFR BLD: 0 % (ref 0–0)
PATH INTERP BLD-IMP: NORMAL

## 2024-09-30 ENCOUNTER — OFFICE VISIT (OUTPATIENT)
Dept: RHEUMATOLOGY | Facility: MEDICAL CENTER | Age: 29
End: 2024-09-30
Attending: INTERNAL MEDICINE
Payer: MEDICAID

## 2024-09-30 ENCOUNTER — TELEPHONE (OUTPATIENT)
Dept: RHEUMATOLOGY | Facility: MEDICAL CENTER | Age: 29
End: 2024-09-30

## 2024-09-30 VITALS
HEART RATE: 84 BPM | OXYGEN SATURATION: 98 % | WEIGHT: 192 LBS | SYSTOLIC BLOOD PRESSURE: 110 MMHG | TEMPERATURE: 98.1 F | BODY MASS INDEX: 30.07 KG/M2 | DIASTOLIC BLOOD PRESSURE: 82 MMHG | RESPIRATION RATE: 14 BRPM

## 2024-09-30 DIAGNOSIS — G56.03 BILATERAL CARPAL TUNNEL SYNDROME: ICD-10-CM

## 2024-09-30 DIAGNOSIS — R73.9 HYPERGLYCEMIA: ICD-10-CM

## 2024-09-30 DIAGNOSIS — M05.79 RHEUMATOID ARTHRITIS INVOLVING MULTIPLE SITES WITH POSITIVE RHEUMATOID FACTOR (HCC): ICD-10-CM

## 2024-09-30 DIAGNOSIS — Z79.1 NSAID LONG-TERM USE: ICD-10-CM

## 2024-09-30 DIAGNOSIS — K75.4 AUTOIMMUNE HEPATITIS (HCC): ICD-10-CM

## 2024-09-30 DIAGNOSIS — Z51.81 MEDICATION MONITORING ENCOUNTER: ICD-10-CM

## 2024-09-30 PROCEDURE — 99212 OFFICE O/P EST SF 10 MIN: CPT | Performed by: INTERNAL MEDICINE

## 2024-09-30 PROCEDURE — RXMED WILLOW AMBULATORY MEDICATION CHARGE: Performed by: INTERNAL MEDICINE

## 2024-09-30 RX ORDER — TOCILIZUMAB 180 MG/ML
INJECTION, SOLUTION SUBCUTANEOUS
Qty: 10.8 ML | Refills: 1 | Status: SHIPPED | OUTPATIENT
Start: 2024-09-30

## 2024-09-30 RX ORDER — PREDNISONE 5 MG/1
TABLET ORAL
Qty: 30 TABLET | Refills: 0 | Status: SHIPPED | OUTPATIENT
Start: 2024-09-30

## 2024-09-30 RX ORDER — PHENTERMINE HYDROCHLORIDE 37.5 MG/1
37.5 CAPSULE ORAL EVERY MORNING
COMMUNITY

## 2024-09-30 ASSESSMENT — JOINT PAIN
TOTAL NUMBER OF TENDER JOINTS: 8
TOTAL NUMBER OF TENDER JOINTS: 11
TOTAL NUMBER OF SWOLLEN JOINTS: 8
TOTAL NUMBER OF SWOLLEN JOINTS: 11

## 2024-09-30 ASSESSMENT — FIBROSIS 4 INDEX: FIB4 SCORE: 0.49

## 2024-09-30 NOTE — TELEPHONE ENCOUNTER
Prior Authorization for Actemra ACTPen 162MG/0.9ML auto-injectors  has been approved for a quantity of 3.6 , day supply 28    Prior Authorization reference number: [id number]  Insurance: [place] [employer]  Effective dates: 09/30/2024 to 09/30/2025  Copay: $0     Is patient eligible to fill with [facility]? Yes    Next Steps: The Patients copay is less than $5.00. Will contact the patient to determine choice of pharmacy, if applicable.

## 2024-09-30 NOTE — TELEPHONE ENCOUNTER
Prior Authorization for Actemra ACTPen 162MG/0.9ML auto-injectors (Quantity: 3.6, Days: 28) has been submitted via Cover My Meds: Key ([id number])    Insurance: [employer]    Will follow up in 24-48 business hours.

## 2024-09-30 NOTE — LETTER
September 30, 2024        Lisbeth Palmjaquan Hernández    To Whom it may Concern,    Please excuse the above named patient today 9/30/2024 and tomorrow 10/1/2024 from her work duties so that she may take care of her medical condition.    Thankyou:)  Felisha Penn MD, JOSE

## 2024-09-30 NOTE — PROGRESS NOTES
Chief Complaint- joint pain     Subjective:   Lisbeth Hernández is a 29 y.o. female here today for follow up of rheumatological issues    This is a follow-up visit for this patient who we see in this clinic for serologically positive rheumatoid arthritis.  Patient is currently on Enbrel 50 mg subcu every 7 days monotherapy, patient has been on other Biologics without benefit.  Had tried putting patient on Actemra but insurance refused as patient needed to try Enbrel first.  Patient now status post 3 months of Enbrel and states that she is feeling miserable.  Patient actually weeping in my office because of how bad she feels.    Since last visit patient has been diagnosed with bilateral carpal tunnel syndrome, currently monitored by [facility].     Complicating factor is that patient also has a diagnosis of autoimmune hepatitis exacerbated by Cimzia so we have to be careful about medications possibly affecting patient's liver.         PMHx  Iron deficient anemia  Rhinoplasty at 16 years old  Hx of saliva glands removed at 16 years old in [place]     Fam Hx   M-bipolar  F-hypercholesterol  Bx3 A/W  Aunt-DGm-RA     Soc Hx  Pos tob quit 2016  Positive vaping  No MJ  ETOH q month  Positive tattoos  No blood tx     S/p enbrel-ineffective   S/p sulindac-ineffective  S/p meloxicam-ineffective  S/p celebrex-contraindicated as patient is allergic to sulfa medications  s/p Cimzia-as Cimzia is TNF inhibitor, it exacerbated patient's autoimmune hepatitis   S/p plaquenil-N/V diarrhea  S/p sulfasalazine-strong family hx of sulfa allergy, patient hesitant   S/p MTX-did not try for fear of exacerbating autoimmune hepatitis  S/p arava-did not try for fear of exacerbating autoimmune hepatitis     F-Actin 26 11/2023  Smooth muscle neg 11/2023  AMA neg 11/2023  HAV neg 11/2023  Notified patient, refer to GI/liver for evaluation     G6PD 17.6 adequate 8/2023  SSA neg 8/2023  SSB neg 8/2023  HBsAg/HBcAb neg 8/2023  HCV neg 8/2023  Quantiferon  Gold neg 8/2023      5/2023  CCP neg 5/2023  HCV neg 3/2022  Hand x-rays 5/2023-IMPRESSION:  No evidence of fracture or arthropathy.     RUQ Ultrasound 12/2023-IMPRESSION:  1.  Hepatomegaly with evidence of hepatic steatosis.     Current Outpatient Medications   Medication Sig Dispense Refill    phentermine 37.5 MG capsule Take 37.5 mg by mouth every morning.      Tocilizumab (ACTEMRA ACTPEN) 162 MG/0.9ML Solution Auto-injector 162 mg  SQ every 7 days 10.8 mL 1    predniSONE (DELTASONE) 5 MG Tab 4 tabs po qam for 4 days then 3 tabs po qam for 4 days then 2 tabs po qam for 4 days then 1 tab po qam for 4 days then stop 30 Tablet 0    nabumetone (RELAFEN) 500 MG Tab 1-2 tabs po bid with food prn joint pain 360 Tablet 0    ENSKYCE 0.15-30 MG-MCG per tablet TAKE 1 TABLET BY MOUTH EVERY DAY 28 Tablet 11    pantoprazole (PROTONIX) 40 MG Tablet Delayed Response TAKE 1 TABLET BY MOUTH ONCE A DAY 30 MINUTES BEFORE BREAKFAST MEAL       No current facility-administered medications for this visit.     She  has a past medical history of Cyclothymia and Iron deficiency anemia due to chronic blood loss (6/18/2020).    ROS   Other than what is mentioned in HPI or physical exam, there is no history of headaches, double vision or blurred vision.  No trouble swallowing difficulties .  No chest complaints including chest pain, cough or sputum production. No GI complaints including nausea, vomiting, change in bowel habits, or past peptic ulcer disease. No history of blood in the stools. No urinary complaints including dysuria or frequency. No history of alopecia, photosensitivity     Objective:     /82   Pulse 84   Temp 36.7 °C (98.1 °F) (Temporal)   Resp 14   Wt 87.1 kg (192 lb)   SpO2 98%  Body mass index is 30.07 kg/m².   Physical Exam:    Constitutional: Alert and oriented X3, patient is talkative with good eye contact.Skin: Warm, dry, good turgor, no rashes in visible areas.Eye: Equal, round and reactive,  conjunctiva clear, lids normal EOM intactENMT: Lips without lesions,  pinna without deformityNeck: Trachea midline, no masses, no thyromegaly.Lymph:  No cervical lymphadenopathy, no axillary lymphadenopathy, no supraclavicular lymphadenopathyRespiratory: Unlabored respiratory effort, lungs clear to auscultation, no wheezes, no ronchi.Cardiovascular: Normal S1, S2, Regular rate and rhythm, no murmurs rubs or gallops  .Abdomen: Soft, non-distended, overweightPsych: Alert and oriented x3, normal affect and mood.Neuro: Cranial nerves 2-12 are grossly intact Ext:no joint laxity noted in bilateral arms, no joint laxity noted in bilateral legs      Lab Results   Component Value Date/Time    HEPBCORIGM Non-Reactive 08/18/2023 07:02 AM    HEPBSAG Non-Reactive 08/18/2023 07:02 AM     Lab Results   Component Value Date/Time    HEPCAB Non-Reactive 08/18/2023 07:02 AM     Lab Results   Component Value Date/Time    SODIUM 138 09/19/2024 06:47 AM    POTASSIUM 3.9 09/19/2024 06:47 AM    CHLORIDE 105 09/19/2024 06:47 AM    CO2 20 09/19/2024 06:47 AM    GLUCOSE 88 09/19/2024 06:47 AM    BUN 8 09/19/2024 06:47 AM    CREATININE 0.62 09/19/2024 06:47 AM      Lab Results   Component Value Date/Time    WBC 6.3 09/19/2024 06:47 AM    RBC 4.66 09/19/2024 06:47 AM    HEMOGLOBIN 11.7 (L) 09/19/2024 06:47 AM    HEMATOCRIT 36.8 (L) 09/19/2024 06:47 AM    MCV 79.0 (L) 09/19/2024 06:47 AM    MCH 25.1 (L) 09/19/2024 06:47 AM    MCHC 31.8 (L) 09/19/2024 06:47 AM    MPV 11.5 09/19/2024 06:47 AM    NEUTSPOLYS 66.20 09/19/2024 06:47 AM    LYMPHOCYTES 23.60 09/19/2024 06:47 AM    MONOCYTES 7.50 09/19/2024 06:47 AM    EOSINOPHILS 1.90 09/19/2024 06:47 AM    BASOPHILS 0.50 09/19/2024 06:47 AM      Lab Results   Component Value Date/Time    CALCIUM 8.8 09/19/2024 06:47 AM    ASTSGOT 30 09/19/2024 06:47 AM    ALTSGPT 25 09/19/2024 06:47 AM    ALKPHOSPHAT 62 09/19/2024 06:47 AM    TBILIRUBIN 0.5 09/19/2024 06:47 AM    ALBUMIN 3.9 09/19/2024 06:47 AM     TOTPROTEIN 7.0 09/19/2024 06:47 AM     Lab Results   Component Value Date/Time    URICACID 4.2 05/19/2023 07:19 AM    RHEUMFACTN 100 (H) 05/19/2023 07:19 AM    CCPANTIBODY 10 05/19/2023 07:19 AM    ANTINUCAB None Detected 06/05/2024 06:58 AM     Lab Results   Component Value Date/Time    ANTISSBSJ 1 08/18/2023 07:02 AM     Lab Results   Component Value Date/Time    SEDRATEWES 21 09/19/2024 06:47 AM     Lab Results   Component Value Date/Time    HBA1C 5.7 (H) 03/18/2024 03:56 PM     Lab Results   Component Value Date/Time    G6PD 17.6 (H) 08/18/2023 07:02 AM     Lab Results   Component Value Date/Time    ANTIMITOCHO 5.8 11/30/2023 08:28 AM    FACTIN 37 (H) 06/05/2024 06:58 AM     Assessment and Plan:     1. Rheumatoid arthritis involving multiple sites with positive rheumatoid factor (HCC)  Not doing well on Enbrel, long discussion with the patient we opted to do a trial of Actemra 162 mg subcu every week.  Will also do an extended prednisone tapering dose to try and get the inflammation under control.  - Tocilizumab (ACTEMRA ACTPEN) 162 MG/0.9ML Solution Auto-injector; 162 mg  SQ every 7 days  Dispense: 10.8 mL; Refill: 1  - predniSONE (DELTASONE) 5 MG Tab; 4 tabs po qam for 4 days then 3 tabs po qam for 4 days then 2 tabs po qam for 4 days then 1 tab po qam for 4 days then stop  Dispense: 30 Tablet; Refill: 0    2. Medication monitoring encounter  Start Actemra 162 mg subcu every week, screening labs are up-to-date, next screening labs due August 2025, patient needs monitoring labs every 6 months, next labs due about March 2025.  We reviewed risks of biological medications with patient including hematological pathology, cancer risks, neurological and infection issues, patient states understanding.  - Tocilizumab (ACTEMRA ACTPEN) 162 MG/0.9ML Solution Auto-injector; 162 mg  SQ every 7 days  Dispense: 10.8 mL; Refill: 1  - predniSONE (DELTASONE) 5 MG Tab; 4 tabs po qam for 4 days then 3 tabs po qam for 4 days then 2  tabs po qam for 4 days then 1 tab po qam for 4 days then stop  Dispense: 30 Tablet; Refill: 0    3. NSAID long-term use  Patient uses NSAIDs as needed, patient needs monitoring labs every 6 months, next labs due about March 2025  - Tocilizumab (ACTEMRA ACTPEN) 162 MG/0.9ML Solution Auto-injector; 162 mg  SQ every 7 days  Dispense: 10.8 mL; Refill: 1  - predniSONE (DELTASONE) 5 MG Tab; 4 tabs po qam for 4 days then 3 tabs po qam for 4 days then 2 tabs po qam for 4 days then 1 tab po qam for 4 days then stop  Dispense: 30 Tablet; Refill: 0    4. Bilateral carpal tunnel syndrome  Currently followed by [facility], no intervention at this time  - Tocilizumab (ACTEMRA ACTPEN) 162 MG/0.9ML Solution Auto-injector; 162 mg  SQ every 7 days  Dispense: 10.8 mL; Refill: 1  - predniSONE (DELTASONE) 5 MG Tab; 4 tabs po qam for 4 days then 3 tabs po qam for 4 days then 2 tabs po qam for 4 days then 1 tab po qam for 4 days then stop  Dispense: 30 Tablet; Refill: 0    5. Autoimmune hepatitis (HCC)  May impact the type of medications we can use for this patient's arthritis. We will have to keep this under advisement.    6. Hyperglycemia  Followed by patients PCP, high blood sugar can exacerbate inflammatory state of patient's arthritis, discussed with patient the need to monitor and control blood sugars, patient states understanding    Followup: Return in about 3 months (around 12/30/2024). or sooner prn      Please note that this dictation was created using voice recognition software. I have made every reasonable attempt to correct obvious errors, but I expect that there are errors of grammar and possibly content that I did not discover before finalizing the note.

## 2024-10-01 ENCOUNTER — DOCUMENTATION (OUTPATIENT)
Dept: PHARMACY | Facility: MEDICAL CENTER | Age: 29
End: 2024-10-01
Payer: MEDICAID

## 2024-10-03 ENCOUNTER — TELEMEDICINE (OUTPATIENT)
Dept: MEDICAL GROUP | Facility: MEDICAL CENTER | Age: 29
End: 2024-10-03
Attending: FAMILY MEDICINE
Payer: MEDICAID

## 2024-10-03 ENCOUNTER — PHARMACY VISIT (OUTPATIENT)
Dept: PHARMACY | Facility: MEDICAL CENTER | Age: 29
End: 2024-10-03
Payer: COMMERCIAL

## 2024-10-03 VITALS — BODY MASS INDEX: 30.13 KG/M2 | HEIGHT: 67 IN | WEIGHT: 192 LBS | RESPIRATION RATE: 16 BRPM

## 2024-10-03 DIAGNOSIS — E66.9 OBESITY (BMI 30-39.9): ICD-10-CM

## 2024-10-03 PROCEDURE — 99213 OFFICE O/P EST LOW 20 MIN: CPT | Mod: 95 | Performed by: INTERNAL MEDICINE

## 2024-10-03 ASSESSMENT — FIBROSIS 4 INDEX: FIB4 SCORE: 0.49

## 2024-10-04 RX ORDER — PHENTERMINE HYDROCHLORIDE 37.5 MG/1
37.5 CAPSULE ORAL EVERY MORNING
Qty: 30 CAPSULE | Refills: 2 | Status: SHIPPED | OUTPATIENT
Start: 2024-10-04 | End: 2025-01-02

## 2024-10-07 DIAGNOSIS — Z51.81 MEDICATION MONITORING ENCOUNTER: ICD-10-CM

## 2024-10-07 DIAGNOSIS — M05.79 RHEUMATOID ARTHRITIS INVOLVING MULTIPLE SITES WITH POSITIVE RHEUMATOID FACTOR (HCC): ICD-10-CM

## 2024-10-07 DIAGNOSIS — Z79.1 NSAID LONG-TERM USE: ICD-10-CM

## 2024-10-07 DIAGNOSIS — G56.03 BILATERAL CARPAL TUNNEL SYNDROME: ICD-10-CM

## 2024-10-07 RX ORDER — PREDNISONE 5 MG/1
TABLET ORAL
Qty: 40 TABLET | Refills: 0 | Status: SHIPPED | OUTPATIENT
Start: 2024-10-07 | End: 2024-10-28

## 2024-10-08 ENCOUNTER — TELEPHONE (OUTPATIENT)
Dept: RHEUMATOLOGY | Facility: MEDICAL CENTER | Age: 29
End: 2024-10-08
Payer: MEDICAID

## 2024-10-16 ENCOUNTER — DOCUMENTATION (OUTPATIENT)
Dept: PHARMACY | Facility: MEDICAL CENTER | Age: 29
End: 2024-10-16
Payer: MEDICAID

## 2024-10-17 ENCOUNTER — OFFICE VISIT (OUTPATIENT)
Dept: MEDICAL GROUP | Facility: MEDICAL CENTER | Age: 29
End: 2024-10-17
Attending: INTERNAL MEDICINE
Payer: MEDICAID

## 2024-10-17 VITALS
DIASTOLIC BLOOD PRESSURE: 80 MMHG | BODY MASS INDEX: 29.99 KG/M2 | RESPIRATION RATE: 16 BRPM | HEART RATE: 93 BPM | SYSTOLIC BLOOD PRESSURE: 112 MMHG | HEIGHT: 67 IN | TEMPERATURE: 97.2 F | WEIGHT: 191.1 LBS | OXYGEN SATURATION: 98 %

## 2024-10-17 DIAGNOSIS — R21 RASH: ICD-10-CM

## 2024-10-17 DIAGNOSIS — K13.79 MOUTH SORES: ICD-10-CM

## 2024-10-17 PROBLEM — N89.8 VAGINAL IRRITATION: Status: RESOLVED | Noted: 2024-08-28 | Resolved: 2024-10-17

## 2024-10-17 PROCEDURE — 99213 OFFICE O/P EST LOW 20 MIN: CPT | Performed by: INTERNAL MEDICINE

## 2024-10-17 PROCEDURE — 99214 OFFICE O/P EST MOD 30 MIN: CPT | Performed by: INTERNAL MEDICINE

## 2024-10-17 PROCEDURE — 3079F DIAST BP 80-89 MM HG: CPT | Performed by: INTERNAL MEDICINE

## 2024-10-17 PROCEDURE — 3074F SYST BP LT 130 MM HG: CPT | Performed by: INTERNAL MEDICINE

## 2024-10-17 ASSESSMENT — FIBROSIS 4 INDEX: FIB4 SCORE: 0.49

## 2024-10-22 ENCOUNTER — TELEPHONE (OUTPATIENT)
Dept: RHEUMATOLOGY | Facility: MEDICAL CENTER | Age: 29
End: 2024-10-22

## 2024-10-22 NOTE — TELEPHONE ENCOUNTER
Patient having problems with her new medications for her arthritis, please schedule follow-up appointment next available to discuss alternative treatment options

## 2024-10-24 ENCOUNTER — TELEMEDICINE (OUTPATIENT)
Dept: MEDICAL GROUP | Facility: MEDICAL CENTER | Age: 29
End: 2024-10-24
Attending: INTERNAL MEDICINE
Payer: MEDICAID

## 2024-10-24 DIAGNOSIS — F33.1 MODERATE EPISODE OF RECURRENT MAJOR DEPRESSIVE DISORDER (HCC): ICD-10-CM

## 2024-10-24 PROCEDURE — 99213 OFFICE O/P EST LOW 20 MIN: CPT | Mod: 95 | Performed by: INTERNAL MEDICINE

## 2024-10-24 RX ORDER — ESCITALOPRAM OXALATE 10 MG/1
TABLET ORAL
Qty: 30 TABLET | Refills: 1 | Status: SHIPPED | OUTPATIENT
Start: 2024-10-24

## 2024-10-28 ENCOUNTER — HOSPITAL ENCOUNTER (OUTPATIENT)
Dept: LAB | Facility: MEDICAL CENTER | Age: 29
End: 2024-10-28
Attending: INTERNAL MEDICINE
Payer: MEDICAID

## 2024-10-28 ENCOUNTER — OFFICE VISIT (OUTPATIENT)
Dept: RHEUMATOLOGY | Facility: MEDICAL CENTER | Age: 29
End: 2024-10-28
Attending: INTERNAL MEDICINE
Payer: MEDICAID

## 2024-10-28 ENCOUNTER — TELEPHONE (OUTPATIENT)
Dept: RHEUMATOLOGY | Facility: MEDICAL CENTER | Age: 29
End: 2024-10-28

## 2024-10-28 VITALS
OXYGEN SATURATION: 97 % | TEMPERATURE: 97.3 F | WEIGHT: 187 LBS | DIASTOLIC BLOOD PRESSURE: 88 MMHG | HEART RATE: 82 BPM | SYSTOLIC BLOOD PRESSURE: 122 MMHG | RESPIRATION RATE: 14 BRPM | BODY MASS INDEX: 29.29 KG/M2

## 2024-10-28 DIAGNOSIS — Z79.1 NSAID LONG-TERM USE: ICD-10-CM

## 2024-10-28 DIAGNOSIS — M25.50 POLYARTHRALGIA: ICD-10-CM

## 2024-10-28 DIAGNOSIS — M05.79 RHEUMATOID ARTHRITIS INVOLVING MULTIPLE SITES WITH POSITIVE RHEUMATOID FACTOR (HCC): ICD-10-CM

## 2024-10-28 DIAGNOSIS — R73.9 HYPERGLYCEMIA: ICD-10-CM

## 2024-10-28 DIAGNOSIS — Z51.81 MEDICATION MONITORING ENCOUNTER: ICD-10-CM

## 2024-10-28 DIAGNOSIS — K75.4 AUTOIMMUNE HEPATITIS (HCC): ICD-10-CM

## 2024-10-28 LAB — URATE SERPL-MCNC: 4.3 MG/DL (ref 1.9–8.2)

## 2024-10-28 PROCEDURE — 99214 OFFICE O/P EST MOD 30 MIN: CPT | Performed by: INTERNAL MEDICINE

## 2024-10-28 PROCEDURE — 84550 ASSAY OF BLOOD/URIC ACID: CPT

## 2024-10-28 PROCEDURE — 3074F SYST BP LT 130 MM HG: CPT | Performed by: INTERNAL MEDICINE

## 2024-10-28 PROCEDURE — 36415 COLL VENOUS BLD VENIPUNCTURE: CPT

## 2024-10-28 PROCEDURE — 99212 OFFICE O/P EST SF 10 MIN: CPT | Performed by: INTERNAL MEDICINE

## 2024-10-28 PROCEDURE — 3079F DIAST BP 80-89 MM HG: CPT | Performed by: INTERNAL MEDICINE

## 2024-10-28 RX ORDER — FOLIC ACID 1 MG/1
TABLET ORAL
Qty: 90 TABLET | Refills: 1 | Status: SHIPPED | OUTPATIENT
Start: 2024-10-28

## 2024-10-28 RX ORDER — GOLIMUMAB 50 MG/.5ML
INJECTION, SOLUTION SUBCUTANEOUS
Qty: 1.5 ML | Refills: 1 | Status: SHIPPED | OUTPATIENT
Start: 2024-10-28 | End: 2024-10-28

## 2024-10-28 RX ORDER — ADALIMUMAB-ATTO 40 MG/.8ML
INJECTION SUBCUTANEOUS
Qty: 4.8 ML | Refills: 0 | Status: SHIPPED | OUTPATIENT
Start: 2024-10-28

## 2024-10-28 RX ORDER — METHOTREXATE 2.5 MG/1
TABLET ORAL
Qty: 48 TABLET | Refills: 0 | Status: SHIPPED | OUTPATIENT
Start: 2024-10-28

## 2024-10-28 ASSESSMENT — FIBROSIS 4 INDEX: FIB4 SCORE: 0.49

## 2024-10-28 ASSESSMENT — JOINT PAIN
TOTAL NUMBER OF SWOLLEN JOINTS: 2
TOTAL NUMBER OF SWOLLEN JOINTS: 1
TOTAL NUMBER OF TENDER JOINTS: 2
TOTAL NUMBER OF TENDER JOINTS: 3

## 2024-10-29 ENCOUNTER — PATIENT MESSAGE (OUTPATIENT)
Dept: MEDICAL GROUP | Facility: MEDICAL CENTER | Age: 29
End: 2024-10-29
Payer: MEDICAID

## 2024-10-29 DIAGNOSIS — R21 RASH: ICD-10-CM

## 2024-10-29 PROCEDURE — RXMED WILLOW AMBULATORY MEDICATION CHARGE: Performed by: INTERNAL MEDICINE

## 2024-10-30 ENCOUNTER — DOCUMENTATION (OUTPATIENT)
Dept: PHARMACY | Facility: MEDICAL CENTER | Age: 29
End: 2024-10-30
Payer: MEDICAID

## 2024-11-01 ENCOUNTER — PHARMACY VISIT (OUTPATIENT)
Dept: PHARMACY | Facility: MEDICAL CENTER | Age: 29
End: 2024-11-01
Payer: COMMERCIAL

## 2024-11-14 ENCOUNTER — DOCUMENTATION (OUTPATIENT)
Dept: PHARMACY | Facility: MEDICAL CENTER | Age: 29
End: 2024-11-14
Payer: MEDICAID

## 2024-11-14 NOTE — PROGRESS NOTES
PHARMACIST CLINICAL MANAGEMENT - First Cycle    Dx: Rheumatoid arthritis involving multiple sites with positive rheumatoid factor [M05.79]     Txt review:   Amjevita 40mg/0.8mL solution in auto-injector, inject 1 pen under the skin every 14 days [+ Methotrexate tabs, 10mg by mouth weekly + Folic acid tab, 1 mg by mouth daily]    Administration: Amjevita 1 pen into the top of her R thigh and demoed understanding of site rotation and avoidance of injecting into her tattoos.   Adherence: First Amjevita dose on 11/2 with next due this upcoming Saturday (11/16/24).     Missed dose mgmt: n/a   Current SE: none     Mitigation/Mgmt: n/a    List Changes to Allergies, Diagnoses: none    # of flares (baseline): estimates 1 per week    # of flares since last assessment: 0  Pain scale (baseline prior to Amjevita start): 8 out of 10   Pain scale in the past week: 0-1  Rapid3 score: 23.7  Med Rec/Updated drug list: EMR inaccurate, medication changes reviewed with patient/caregiver: (new/discontinued meds, dose/frequency of admin changes)   CURRENT:     Methotrexate    Folic Acid    Piroxicam     Amjevita    Escitalopram    Phentermine    Enskyce    Pantoprazole    Tumeric    Collagen     Fish oil    Apple cidar vinegar  Additional: Had a pleasant call with Lisbeth who was following up on her tolerability of Amjevita. She reports things have been going really well and has had no issues with self-admin with the new devices and no side effects. She is happy to say she's seen a dramatic improvement on this new regimen and after 2 weeks her hands are almost back to normal - no longer swollen and return of range of motion. She still has one finger that might lock up in the morning but nowhere near the stiffness she was experiencing. Informed me today her pain is down to a 0-1, global score 1 and no flares since starting Amjevita/MTX/FA. I did advise this is a very promising start and full benefits are still yet to be seen and can take up  to 3 to 4 months. She was thankful for the check in and happy on therapy so far; no questions/concerns at this time.

## 2024-11-15 ENCOUNTER — OFFICE VISIT (OUTPATIENT)
Dept: MEDICAL GROUP | Facility: MEDICAL CENTER | Age: 29
End: 2024-11-15
Attending: INTERNAL MEDICINE
Payer: MEDICAID

## 2024-11-15 ENCOUNTER — HOSPITAL ENCOUNTER (OUTPATIENT)
Facility: MEDICAL CENTER | Age: 29
End: 2024-11-15
Attending: INTERNAL MEDICINE
Payer: MEDICAID

## 2024-11-15 VITALS
HEIGHT: 67 IN | BODY MASS INDEX: 29.02 KG/M2 | WEIGHT: 184.9 LBS | SYSTOLIC BLOOD PRESSURE: 102 MMHG | RESPIRATION RATE: 16 BRPM | DIASTOLIC BLOOD PRESSURE: 76 MMHG | OXYGEN SATURATION: 97 % | TEMPERATURE: 97.5 F | HEART RATE: 82 BPM

## 2024-11-15 DIAGNOSIS — R21 RASH: ICD-10-CM

## 2024-11-15 DIAGNOSIS — N89.8 VAGINAL IRRITATION: ICD-10-CM

## 2024-11-15 PROBLEM — F34.0 CYCLOTHYMIA: Status: RESOLVED | Noted: 2021-04-01 | Resolved: 2024-11-15

## 2024-11-15 PROCEDURE — 87660 TRICHOMONAS VAGIN DIR PROBE: CPT

## 2024-11-15 PROCEDURE — 87491 CHLMYD TRACH DNA AMP PROBE: CPT

## 2024-11-15 PROCEDURE — 87591 N.GONORRHOEAE DNA AMP PROB: CPT

## 2024-11-15 PROCEDURE — 3074F SYST BP LT 130 MM HG: CPT | Performed by: INTERNAL MEDICINE

## 2024-11-15 PROCEDURE — 3078F DIAST BP <80 MM HG: CPT | Performed by: INTERNAL MEDICINE

## 2024-11-15 PROCEDURE — 99214 OFFICE O/P EST MOD 30 MIN: CPT | Performed by: INTERNAL MEDICINE

## 2024-11-15 PROCEDURE — 87510 GARDNER VAG DNA DIR PROBE: CPT

## 2024-11-15 PROCEDURE — 87480 CANDIDA DNA DIR PROBE: CPT

## 2024-11-15 ASSESSMENT — FIBROSIS 4 INDEX: FIB4 SCORE: 0.49

## 2024-11-16 NOTE — PROGRESS NOTES
Subjective:   Lisbeth Hernández is a 29 y.o. female here today for rash and vaginal irritation    Vaginal irritation  She presents for some persistent vaginal irritation.  This happened about 3 months ago and at that time she had BV.  Now she reports that she is having very minimal discharge but she would still like testing for any vaginal infection.  She feels like when she wipes that her genital area is raw and she is having some discomfort when she is sexually active.  Reports that she has 1 partner who is asymptomatic.    Rash  She has a new rash on her right thigh which is the area where she typically injects her biologic.  She last injected about 2 weeks ago.  This rash just popped up 1 to 2 days ago.  It was itchy initially, raised, and red.  Picture in media.  She has not tried any creams or topical therapies.       Current medicines (including changes today)  Current Outpatient Medications   Medication Sig Dispense Refill    methotrexate 2.5 MG tablet Take 4 tabs by mouth every week 48 Tablet 0    folic acid (FOLVITE) 1 MG Tab 1 tab po qday 90 Tablet 1    piroxicam (FELDENE) 20 MG Cap Take 1 Capsule by mouth every day. 90 Capsule 1    Adalimumab-atto (AMJEVITA) 40 MG/0.8ML Solution Auto-injector Inject 40 mg SQ every 14 days 4.8 mL 0    escitalopram (LEXAPRO) 10 MG Tab Take 1/2 tab for 1 week then increase to 1 full tab daily 30 Tablet 1    phentermine 37.5 MG capsule Take 1 Capsule by mouth every morning for 90 days. 30 Capsule 2    ENSKYCE 0.15-30 MG-MCG per tablet TAKE 1 TABLET BY MOUTH EVERY DAY 28 Tablet 11    pantoprazole (PROTONIX) 40 MG Tablet Delayed Response TAKE 1 TABLET BY MOUTH ONCE A DAY 30 MINUTES BEFORE BREAKFAST MEAL       No current facility-administered medications for this visit.     She  has a past medical history of Cyclothymia and Iron deficiency anemia due to chronic blood loss (6/18/2020).         Objective:     Vitals:    11/15/24 1423   BP: 102/76   Pulse: 82   Resp: 16   Temp:  36.4 °C (97.5 °F)   SpO2: 97%     Body mass index is 28.96 kg/m².   Physical Exam:  Constitutional: Alert, no distress.  Skin: Warm, dry, good turgor, large warm raised red patch over right thigh         : External genitalia including labia majora and minora appear normal with no ulcerations or lesions.      Assessment and Plan:   The following treatment plan was discussed    1. Vaginal irritation  Vaginal pathogen testing collected.  I did not see any obvious abnormalities on exam.  We discussed that if all of her testing is negative and symptoms persist that follow-up with gynecology would be warranted and I have placed a referral.  - VAGINAL PATHOGENS DNA PANEL; Future  - Chlamydia/GC, PCR (Genital/Anal swab); Future  - Referral to Gynecology    2. Rash  Question whether this might be a delayed injection site reaction.  I encouraged her to follow-up with her rheumatologist and I will also send a message.  We discussed that if it is itchy she can apply some topical corticosteroid cream.    Followup: Return if symptoms worsen or fail to improve.

## 2024-11-16 NOTE — ASSESSMENT & PLAN NOTE
She has a new rash on her right thigh which is the area where she typically injects her biologic.  She last injected about 2 weeks ago.  This rash just popped up 1 to 2 days ago.  It was itchy initially, raised, and red.  Picture in media.  She has not tried any creams or topical therapies.

## 2024-11-16 NOTE — ASSESSMENT & PLAN NOTE
She presents for some persistent vaginal irritation.  This happened about 3 months ago and at that time she had BV.  Now she reports that she is having very minimal discharge but she would still like testing for any vaginal infection.  She feels like when she wipes that her genital area is raw and she is having some discomfort when she is sexually active.  Reports that she has 1 partner who is asymptomatic.

## 2024-11-18 ENCOUNTER — PATIENT MESSAGE (OUTPATIENT)
Dept: MEDICAL GROUP | Facility: MEDICAL CENTER | Age: 29
End: 2024-11-18
Payer: MEDICAID

## 2024-11-18 DIAGNOSIS — Z51.81 MEDICATION MONITORING ENCOUNTER: ICD-10-CM

## 2024-11-18 DIAGNOSIS — M05.79 RHEUMATOID ARTHRITIS INVOLVING MULTIPLE SITES WITH POSITIVE RHEUMATOID FACTOR (HCC): ICD-10-CM

## 2024-11-18 RX ORDER — ADALIMUMAB-ATTO 40 MG/.4ML
INJECTION SUBCUTANEOUS
Qty: 2.4 ML | Refills: 0 | Status: SHIPPED | OUTPATIENT
Start: 2024-11-18

## 2024-11-19 DIAGNOSIS — N76.0 BACTERIAL VAGINOSIS: ICD-10-CM

## 2024-11-19 DIAGNOSIS — B96.89 BACTERIAL VAGINOSIS: ICD-10-CM

## 2024-11-19 RX ORDER — METRONIDAZOLE 500 MG/1
500 TABLET ORAL 2 TIMES DAILY
Qty: 14 TABLET | Refills: 0 | Status: SHIPPED | OUTPATIENT
Start: 2024-11-19 | End: 2024-11-26

## 2024-11-25 PROCEDURE — RXMED WILLOW AMBULATORY MEDICATION CHARGE: Performed by: INTERNAL MEDICINE

## 2024-11-26 ENCOUNTER — PHARMACY VISIT (OUTPATIENT)
Dept: PHARMACY | Facility: MEDICAL CENTER | Age: 29
End: 2024-11-26
Payer: COMMERCIAL

## 2024-12-03 ENCOUNTER — TELEPHONE (OUTPATIENT)
Dept: RHEUMATOLOGY | Facility: MEDICAL CENTER | Age: 29
End: 2024-12-03

## 2024-12-03 DIAGNOSIS — M05.79 RHEUMATOID ARTHRITIS INVOLVING MULTIPLE SITES WITH POSITIVE RHEUMATOID FACTOR (HCC): ICD-10-CM

## 2024-12-03 DIAGNOSIS — Z51.81 MEDICATION MONITORING ENCOUNTER: ICD-10-CM

## 2024-12-03 RX ORDER — ADALIMUMAB-ATTO 40 MG/.4ML
INJECTION SUBCUTANEOUS
Qty: 2.4 ML | Refills: 0 | Status: SHIPPED | OUTPATIENT
Start: 2024-12-03

## 2024-12-03 NOTE — TELEPHONE ENCOUNTER
Amjevita refill sent to [facility] specialty pharmacy, patient needs to do labs that were  ordered October 28, 2024 for refills on methotrexate

## 2024-12-09 ENCOUNTER — APPOINTMENT (OUTPATIENT)
Dept: OBGYN | Facility: CLINIC | Age: 29
End: 2024-12-09
Attending: INTERNAL MEDICINE
Payer: MEDICAID

## 2024-12-23 ENCOUNTER — HOSPITAL ENCOUNTER (OUTPATIENT)
Dept: LAB | Facility: MEDICAL CENTER | Age: 29
End: 2024-12-23
Attending: INTERNAL MEDICINE
Payer: MEDICAID

## 2024-12-23 DIAGNOSIS — Z79.1 NSAID LONG-TERM USE: ICD-10-CM

## 2024-12-23 DIAGNOSIS — M05.79 RHEUMATOID ARTHRITIS INVOLVING MULTIPLE SITES WITH POSITIVE RHEUMATOID FACTOR (HCC): ICD-10-CM

## 2024-12-23 DIAGNOSIS — Z51.81 MEDICATION MONITORING ENCOUNTER: ICD-10-CM

## 2024-12-23 DIAGNOSIS — K75.4 AUTOIMMUNE HEPATITIS (HCC): ICD-10-CM

## 2024-12-23 LAB
ALBUMIN SERPL BCP-MCNC: 4.3 G/DL (ref 3.2–4.9)
ALBUMIN/GLOB SERPL: 1.3 G/DL
ALP SERPL-CCNC: 60 U/L (ref 30–99)
ALT SERPL-CCNC: 26 U/L (ref 2–50)
ANION GAP SERPL CALC-SCNC: 13 MMOL/L (ref 7–16)
AST SERPL-CCNC: 23 U/L (ref 12–45)
BASOPHILS # BLD AUTO: 1 % (ref 0–1.8)
BASOPHILS # BLD: 0.07 K/UL (ref 0–0.12)
BILIRUB SERPL-MCNC: 0.4 MG/DL (ref 0.1–1.5)
BUN SERPL-MCNC: 12 MG/DL (ref 8–22)
CALCIUM ALBUM COR SERPL-MCNC: 8.3 MG/DL (ref 8.5–10.5)
CALCIUM SERPL-MCNC: 8.5 MG/DL (ref 8.5–10.5)
CHLORIDE SERPL-SCNC: 103 MMOL/L (ref 96–112)
CO2 SERPL-SCNC: 20 MMOL/L (ref 20–33)
CREAT SERPL-MCNC: 0.58 MG/DL (ref 0.5–1.4)
EOSINOPHIL # BLD AUTO: 0.11 K/UL (ref 0–0.51)
EOSINOPHIL NFR BLD: 1.5 % (ref 0–6.9)
ERYTHROCYTE [DISTWIDTH] IN BLOOD BY AUTOMATED COUNT: 45.4 FL (ref 35.9–50)
ERYTHROCYTE [SEDIMENTATION RATE] IN BLOOD BY WESTERGREN METHOD: 18 MM/HOUR (ref 0–25)
GFR SERPLBLD CREATININE-BSD FMLA CKD-EPI: 125 ML/MIN/1.73 M 2
GLOBULIN SER CALC-MCNC: 3.2 G/DL (ref 1.9–3.5)
GLUCOSE SERPL-MCNC: 81 MG/DL (ref 65–99)
HCT VFR BLD AUTO: 35.2 % (ref 37–47)
HGB BLD-MCNC: 11.1 G/DL (ref 12–16)
IMM GRANULOCYTES # BLD AUTO: 0.01 K/UL (ref 0–0.11)
IMM GRANULOCYTES NFR BLD AUTO: 0.1 % (ref 0–0.9)
LYMPHOCYTES # BLD AUTO: 3.55 K/UL (ref 1–4.8)
LYMPHOCYTES NFR BLD: 48.8 % (ref 22–41)
MCH RBC QN AUTO: 25.2 PG (ref 27–33)
MCHC RBC AUTO-ENTMCNC: 31.5 G/DL (ref 32.2–35.5)
MCV RBC AUTO: 79.8 FL (ref 81.4–97.8)
MONOCYTES # BLD AUTO: 0.42 K/UL (ref 0–0.85)
MONOCYTES NFR BLD AUTO: 5.8 % (ref 0–13.4)
NEUTROPHILS # BLD AUTO: 3.12 K/UL (ref 1.82–7.42)
NEUTROPHILS NFR BLD: 42.8 % (ref 44–72)
NRBC # BLD AUTO: 0 K/UL
NRBC BLD-RTO: 0 /100 WBC (ref 0–0.2)
PLATELET # BLD AUTO: 435 K/UL (ref 164–446)
PMV BLD AUTO: 11.2 FL (ref 9–12.9)
POTASSIUM SERPL-SCNC: 3.8 MMOL/L (ref 3.6–5.5)
PROT SERPL-MCNC: 7.5 G/DL (ref 6–8.2)
RBC # BLD AUTO: 4.41 M/UL (ref 4.2–5.4)
SODIUM SERPL-SCNC: 136 MMOL/L (ref 135–145)
WBC # BLD AUTO: 7.3 K/UL (ref 4.8–10.8)

## 2024-12-23 PROCEDURE — 85652 RBC SED RATE AUTOMATED: CPT

## 2024-12-23 PROCEDURE — 85025 COMPLETE CBC W/AUTO DIFF WBC: CPT

## 2024-12-23 PROCEDURE — 36415 COLL VENOUS BLD VENIPUNCTURE: CPT

## 2024-12-23 PROCEDURE — 80053 COMPREHEN METABOLIC PANEL: CPT

## 2024-12-26 DIAGNOSIS — F33.1 MODERATE EPISODE OF RECURRENT MAJOR DEPRESSIVE DISORDER (HCC): ICD-10-CM

## 2024-12-27 RX ORDER — ESCITALOPRAM OXALATE 10 MG/1
10 TABLET ORAL DAILY
Qty: 30 TABLET | Refills: 5 | Status: SHIPPED | OUTPATIENT
Start: 2024-12-27

## 2024-12-27 NOTE — TELEPHONE ENCOUNTER
Received request via: Pharmacy    Was the patient seen in the last year in this department? Yes    Does the patient have an active prescription (recently filled or refills available) for medication(s) requested? No    Pharmacy Name: [facility]    Does the patient have [facility] and need 100-day supply? (This applies to ALL medications) Patient does not have SCP    Future Appointments         Provider Department Center    1/9/2025 9:45 AM (Arrive by 9:30 AM) Felisha Penn M.D. [facility]

## 2025-01-03 ENCOUNTER — OFFICE VISIT (OUTPATIENT)
Dept: MEDICAL GROUP | Facility: MEDICAL CENTER | Age: 30
End: 2025-01-03
Attending: FAMILY MEDICINE
Payer: MEDICAID

## 2025-01-03 VITALS
DIASTOLIC BLOOD PRESSURE: 70 MMHG | SYSTOLIC BLOOD PRESSURE: 100 MMHG | BODY MASS INDEX: 29.82 KG/M2 | TEMPERATURE: 97.7 F | RESPIRATION RATE: 14 BRPM | OXYGEN SATURATION: 98 % | WEIGHT: 190 LBS | HEIGHT: 67 IN | HEART RATE: 90 BPM

## 2025-01-03 DIAGNOSIS — J06.9 UPPER RESPIRATORY TRACT INFECTION, UNSPECIFIED TYPE: ICD-10-CM

## 2025-01-03 LAB
FLUAV RNA SPEC QL NAA+PROBE: NEGATIVE
FLUBV RNA SPEC QL NAA+PROBE: NEGATIVE
RSV RNA SPEC QL NAA+PROBE: POSITIVE
S PYO DNA SPEC NAA+PROBE: NOT DETECTED
SARS-COV-2 RNA RESP QL NAA+PROBE: NEGATIVE

## 2025-01-03 PROCEDURE — 99213 OFFICE O/P EST LOW 20 MIN: CPT | Performed by: FAMILY MEDICINE

## 2025-01-03 PROCEDURE — 0241U POCT CEPHEID COV-2, FLU A/B, RSV - PCR: CPT | Performed by: FAMILY MEDICINE

## 2025-01-03 PROCEDURE — 3078F DIAST BP <80 MM HG: CPT | Performed by: FAMILY MEDICINE

## 2025-01-03 PROCEDURE — 3074F SYST BP LT 130 MM HG: CPT | Performed by: FAMILY MEDICINE

## 2025-01-03 PROCEDURE — 87651 STREP A DNA AMP PROBE: CPT | Performed by: FAMILY MEDICINE

## 2025-01-03 ASSESSMENT — FIBROSIS 4 INDEX: FIB4 SCORE: 0.3

## 2025-01-03 NOTE — LETTER
January 3, 2025    To Whom It May Concern:         This is confirmation that Lisbeth Hernández attended her scheduled appointment with Christina Epps M.D. on 1/03/25. She was diagnosed with RSV and may return to work on Monday 1/6/25 if she is feeling improved.        Sincerely,          Christina Epps M.D.  Electronically signed 1/3/2025

## 2025-01-03 NOTE — LETTER
January 3, 2025    To Whom it May Concern,    Lisbeth Polo Hernández was seen in my office on 1/3/25. Please excuse her from work on 1/3/25.     Sincerely,                                    Christina Epps M.D.

## 2025-01-04 NOTE — PROGRESS NOTES
"Verbal consent was acquired by the patient to use [employer] ambient listening note generation during this visit   Subjective:     HPI:   History of Present Illness  The patient presents for evaluation of cough, runny nose, and tonsil stones.    Respiratory Symptoms  She began experiencing body aches on Wednesday night. She is currently on immunosuppressants, which she refrains from taking during periods of illness. Yesterday, she experienced additional symptoms of sweating, alternating hot and cold sensations, and general malaise. Today, she has developed a cough and runny nose. She reports no ear-related issues or sore throat but notes the presence of tonsil stones and mild tonsillar swelling and redness. She is scheduled to take Anakinra tomorrow and methotrexate in the coming days. She is seeking advice on whether these symptoms warrant antibiotic treatment. 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 She recalls that during her last two illnesses, she initially lost her voice before becoming symptomatic.           Objective:     Exam:  /70 (BP Location: Left arm, Patient Position: Sitting)   Pulse 90   Temp 36.5 °C (97.7 °F) (Temporal)   Resp 14   Ht 1.702 m (5' 7\")   Wt 86.2 kg (190 lb)   SpO2 98%   BMI 29.76 kg/m²  Body mass index is 29.76 kg/m².    Physical Exam  No pus noted in the throat. Tonsil stones are present. Tonsils are slightly enlarged.  No wheezing detected in the lungs.  Heart was examined.    General: Normal appearing. No distress.  ENT: Ears normal shape and contour, canals are clear bilaterally, tympanic membranes are benign, oropharynx is with mild erythema and tonsils slightly enlarged, but no exudates.   Neck: Supple. Thyroid is not enlarged.  Pulmonary: Clear to ausculation.  Normal effort. No rales, ronchi, or wheezing.  Cardiovascular: Regular rate and rhythm without murmur. Radial pulses are intact and equal bilaterally.    Data: "   Positive for RSV    Assessment & Plan:     1. Upper respiratory tract infection, unspecified type  POCT CoV-2, Flu A/B, RSV by PCR    POCT CEPHEID GROUP A STREP - PCR          Assessment & Plan  1. Upper respiratory infection - Symptoms include body aches, sweats, cough, runny nose, and fatigue. The patient reports tonsil stones and mild tonsil enlargement but no sore throat or pain when swallowing.  - A swab test for COVID-19, influenza, and RSV will be conducted - positive for RSV.  - The results will be communicated via [employer].  - She is advised to consult with her rheumatologist regarding the continuation of her immunosuppressant medications during this period of illness.  - recommended OTC medications for symptomatic treatment. Rest, hydration, good hand hygiene emphasized.          Return if symptoms worsen or fail to improve.      Please note that this dictation was created using voice recognition software. I have made every reasonable attempt to correct obvious errors, but I expect that there are errors of grammar and possibly content that I did not discover before finalizing the note.

## 2025-01-09 ENCOUNTER — OFFICE VISIT (OUTPATIENT)
Dept: RHEUMATOLOGY | Facility: MEDICAL CENTER | Age: 30
End: 2025-01-09
Attending: INTERNAL MEDICINE
Payer: MEDICAID

## 2025-01-09 VITALS
SYSTOLIC BLOOD PRESSURE: 110 MMHG | WEIGHT: 189 LBS | HEART RATE: 66 BPM | RESPIRATION RATE: 14 BRPM | OXYGEN SATURATION: 98 % | TEMPERATURE: 97.4 F | DIASTOLIC BLOOD PRESSURE: 60 MMHG | BODY MASS INDEX: 29.6 KG/M2

## 2025-01-09 DIAGNOSIS — Z79.1 NSAID LONG-TERM USE: ICD-10-CM

## 2025-01-09 DIAGNOSIS — R73.9 HYPERGLYCEMIA: ICD-10-CM

## 2025-01-09 DIAGNOSIS — Z51.81 MEDICATION MONITORING ENCOUNTER: ICD-10-CM

## 2025-01-09 DIAGNOSIS — M05.79 RHEUMATOID ARTHRITIS INVOLVING MULTIPLE SITES WITH POSITIVE RHEUMATOID FACTOR (HCC): ICD-10-CM

## 2025-01-09 DIAGNOSIS — K75.4 AUTOIMMUNE HEPATITIS (HCC): ICD-10-CM

## 2025-01-09 PROCEDURE — 99212 OFFICE O/P EST SF 10 MIN: CPT | Performed by: INTERNAL MEDICINE

## 2025-01-09 PROCEDURE — 3078F DIAST BP <80 MM HG: CPT | Performed by: INTERNAL MEDICINE

## 2025-01-09 PROCEDURE — 99214 OFFICE O/P EST MOD 30 MIN: CPT | Performed by: INTERNAL MEDICINE

## 2025-01-09 PROCEDURE — 3074F SYST BP LT 130 MM HG: CPT | Performed by: INTERNAL MEDICINE

## 2025-01-09 ASSESSMENT — FIBROSIS 4 INDEX: FIB4 SCORE: 0.3

## 2025-01-09 NOTE — PROGRESS NOTES
Chief Complaint- joint pain     Subjective:   Lisbeth Hernández is a 29 y.o. female here today for follow up of rheumatological issues    This is a follow-up visit for this patient who we see in this clinic for serologically positive rheumatoid arthritis.  Patient is currently on Amjevita injections 40 mg subcu every 2 weeks with great benefit.  At last visit we had also added methotrexate 10 mg p.o. weekly which helped as well but patient states that she would like to go off methotrexate as she is thinking about becoming pregnant again. Patient denies any side effects from the medication, denies any unexplained weight loss, denies any fevers of unknown etiology, denies any GI upset, denies any rashes, denies any new joint swelling, denies recurrent infections.  Of note recent sedimentation rate equals 18 December 2024.    Patient currently off Amjevita secondary to RSV upper respiratory infection.     Since last visit patient has been diagnosed with bilateral carpal tunnel syndrome, currently monitored by [facility].     Complicating factor is that patient also has a diagnosis of autoimmune hepatitis exacerbated by Cimzia so we have to be careful about medications possibly affecting patient's liver.            PMHx  Iron deficient anemia  Rhinoplasty at 16 years old  Hx of saliva glands removed at 16 years old in [place]     Fam Hx   M-bipolar  F-hypercholesterol  Bx3 A/W  Aunt-DGm-RA     Soc Hx  Pos tob quit 2016  Positive vaping  No MJ  ETOH q month  Positive tattoos  No blood tx     S/p Simponi-not covered by insurance   S/p Orencia-ineffective  S/p Actemra-ineffective   S/p enbrel-ineffective   S/p nabumetone-ineffective  S/p sulindac-ineffective  S/p meloxicam-ineffective  S/p celebrex-contraindicated as patient is allergic to sulfa medications  s/p Cimzia-as Cimzia is TNF inhibitor, it exacerbated patient's autoimmune hepatitis   S/p plaquenil-N/V diarrhea  S/p sulfasalazine-strong family hx of sulfa allergy,  patient hesitant   S/p MTX-did not try for fear of exacerbating autoimmune hepatitis  S/p arava-did not try for fear of exacerbating autoimmune hepatitis       Uric acid 4.3 nl 10/2024  F-Actin 26 11/2023  Smooth muscle neg 11/2023  AMA neg 11/2023  HAV neg 11/2023  Notified patient, refer to GI/liver for evaluation     G6PD 17.6 adequate 8/2023  SSA neg 8/2023  SSB neg 8/2023  HBsAg/HBcAb neg 8/2023  HCV neg 8/2023  Quantiferon Gold neg 8/2023      5/2023  CCP neg 5/2023  HCV neg 3/2022  Hand x-rays 5/2023-IMPRESSION:  No evidence of fracture or arthropathy.     RUQ Ultrasound 12/2023-IMPRESSION:  1.  Hepatomegaly with evidence of hepatic steatosis.                 Current Outpatient Medications   Medication Sig Dispense Refill    escitalopram (LEXAPRO) 10 MG Tab Take 1 Tablet by mouth every day. 30 Tablet 5    Adalimumab-atto (AMJEVITA) 40 MG/0.4ML Solution Auto-injector Inject 40 mg subcutaneously every 14 days 2.4 mL 0    folic acid (FOLVITE) 1 MG Tab 1 tab po qday 90 Tablet 1    piroxicam (FELDENE) 20 MG Cap Take 1 Capsule by mouth every day. 90 Capsule 1    ENSKYCE 0.15-30 MG-MCG per tablet TAKE 1 TABLET BY MOUTH EVERY DAY 28 Tablet 11    pantoprazole (PROTONIX) 40 MG Tablet Delayed Response TAKE 1 TABLET BY MOUTH ONCE A DAY 30 MINUTES BEFORE BREAKFAST MEAL       No current facility-administered medications for this visit.     She  has a past medical history of Cyclothymia and Iron deficiency anemia due to chronic blood loss (6/18/2020).    ROS   Other than what is mentioned in HPI or physical exam, there is no history of headaches, double vision or blurred vision.  No trouble swallowing difficulties .  No chest complaints including chest pain, cough or sputum production. No GI complaints including nausea, vomiting, change in bowel habits, or past peptic ulcer disease. No history of blood in the stools. No urinary complaints including dysuria or frequency. No history of alopecia, photosensitivity      Objective:     /60   Pulse 66   Temp 36.3 °C (97.4 °F) (Temporal)   Resp 14   Wt 85.7 kg (189 lb)   SpO2 98%  Body mass index is 29.6 kg/m².   Physical Exam:    Constitutional: Alert and oriented X3, patient is talkative with good eye contact.Skin: Warm, dry, good turgor, no rashes in visible areas.Eye: Equal, round and reactive, conjunctiva clear, lids normal EOM intactENMT: Lips without lesions,  pinna without deformityNeck: Trachea midline, no masses, no thyromegaly.Lymph:  No cervical lymphadenopathy, no axillary lymphadenopathy, no supraclavicular lymphadenopathyRespiratory: Unlabored respiratory effort, lungs clear to auscultation, no wheezes, no ronchi.Cardiovascular: Normal S1, S2, Regular rate and rhythm, no murmurs rubs or gallops  .Abdomen: Soft, non-distended.Psych: Alert and oriented x3, normal affect and mood.Neuro: Cranial nerves 2-12 are grossly intact Ext:no joint laxity noted in bilateral arms, no joint laxity noted in bilateral legs, joints look great, no swan-neck or boutonniere changes, no dactylitis, toes without crossover toes and without splay toes, shoulders full range of motion without limitations, gait without antalgia and without foot drop    Lab Results   Component Value Date/Time    HEPBCORIGM Non-Reactive 08/18/2023 07:02 AM    HEPBSAG Non-Reactive 08/18/2023 07:02 AM     Lab Results   Component Value Date/Time    HEPCAB Non-Reactive 08/18/2023 07:02 AM     Lab Results   Component Value Date/Time    SODIUM 136 12/23/2024 03:37 PM    POTASSIUM 3.8 12/23/2024 03:37 PM    CHLORIDE 103 12/23/2024 03:37 PM    CO2 20 12/23/2024 03:37 PM    GLUCOSE 81 12/23/2024 03:37 PM    BUN 12 12/23/2024 03:37 PM    CREATININE 0.58 12/23/2024 03:37 PM      Lab Results   Component Value Date/Time    WBC 7.3 12/23/2024 03:37 PM    RBC 4.41 12/23/2024 03:37 PM    HEMOGLOBIN 11.1 (L) 12/23/2024 03:37 PM    HEMATOCRIT 35.2 (L) 12/23/2024 03:37 PM    MCV 79.8 (L) 12/23/2024 03:37 PM    MCH 25.2 (L)  12/23/2024 03:37 PM    MCHC 31.5 (L) 12/23/2024 03:37 PM    MPV 11.2 12/23/2024 03:37 PM    NEUTSPOLYS 42.80 (L) 12/23/2024 03:37 PM    LYMPHOCYTES 48.80 (H) 12/23/2024 03:37 PM    MONOCYTES 5.80 12/23/2024 03:37 PM    EOSINOPHILS 1.50 12/23/2024 03:37 PM    BASOPHILS 1.00 12/23/2024 03:37 PM      Lab Results   Component Value Date/Time    CALCIUM 8.5 12/23/2024 03:37 PM    ASTSGOT 23 12/23/2024 03:37 PM    ALTSGPT 26 12/23/2024 03:37 PM    ALKPHOSPHAT 60 12/23/2024 03:37 PM    TBILIRUBIN 0.4 12/23/2024 03:37 PM    ALBUMIN 4.3 12/23/2024 03:37 PM    TOTPROTEIN 7.5 12/23/2024 03:37 PM     Lab Results   Component Value Date/Time    URICACID 4.3 10/28/2024 09:56 AM    RHEUMFACTN 100 (H) 05/19/2023 07:19 AM    CCPANTIBODY 10 05/19/2023 07:19 AM    ANTINUCAB None Detected 06/05/2024 06:58 AM     Lab Results   Component Value Date/Time    ANTISSBSJ 1 08/18/2023 07:02 AM     Lab Results   Component Value Date/Time    SEDRATEWES 18 12/23/2024 03:37 PM     Lab Results   Component Value Date/Time    HBA1C 5.7 (H) 03/18/2024 03:56 PM     Lab Results   Component Value Date/Time    G6PD 17.6 (H) 08/18/2023 07:02 AM     Lab Results   Component Value Date/Time    ANTIMITOCHO 5.8 11/30/2023 08:28 AM    FACTIN 37 (H) 06/05/2024 06:58 AM     Assessment and Plan:     1. Rheumatoid arthritis involving multiple sites with positive rheumatoid factor (HCC)  Clinically stable, continue Amjevita 40 mg subcu every 2 weeks monotherapy, we will stop the methotrexate as patient is considering getting pregnant.  Patient also to not take Amjevita while she has an infection, patient states understanding    2. Medication monitoring encounter  Currently on Amjevita 40 mg subcu every 2 weeks, screening labs are up-to-date, next screening labs due August 2025, patient needs monitoring labs every 6 months, next labs due June 2025  We reviewed risks of biological medications with patient including hematological pathology, cancer risks, neurological and  infection issues, patient states understanding.    3. NSAID long-term use  Patient uses piroxicam as needed, patient needs monitoring labs every 6 months, next labs due June 2025    4. Autoimmune hepatitis (HCC)  With positive F-actin, May impact the type of medications we can use for this patient's arthritis. We will have to keep this under advisement.    5. Hyperglycemia  Followed by patients PCP, high blood sugar can exacerbate inflammatory state of patient's arthritis, discussed with patient the need to monitor and control blood sugars, patient states understanding      Followup: Return in about 6 months (around 7/9/2025). or sooner prn      Please note that this dictation was created using voice recognition software. I have made every reasonable attempt to correct obvious errors, but I expect that there are errors of grammar and possibly content that I did not discover before finalizing the note.

## 2025-02-06 DIAGNOSIS — M05.9 SEROPOSITIVE RHEUMATOID ARTHRITIS (HCC): ICD-10-CM

## 2025-02-06 RX ORDER — ADALIMUMAB-ATTO 40 MG/.4ML
40 INJECTION SUBCUTANEOUS
Qty: 0.8 ML | Refills: 5 | Status: SHIPPED | OUTPATIENT
Start: 2025-02-06

## 2025-05-05 ENCOUNTER — OFFICE VISIT (OUTPATIENT)
Dept: RHEUMATOLOGY | Facility: MEDICAL CENTER | Age: 30
End: 2025-05-05
Attending: STUDENT IN AN ORGANIZED HEALTH CARE EDUCATION/TRAINING PROGRAM
Payer: MEDICAID

## 2025-05-05 VITALS
DIASTOLIC BLOOD PRESSURE: 72 MMHG | RESPIRATION RATE: 14 BRPM | TEMPERATURE: 97.6 F | BODY MASS INDEX: 29.51 KG/M2 | HEIGHT: 67 IN | HEART RATE: 68 BPM | OXYGEN SATURATION: 98 % | WEIGHT: 188 LBS | SYSTOLIC BLOOD PRESSURE: 120 MMHG

## 2025-05-05 DIAGNOSIS — R76.8 SEROLOGIC AUTOIMMUNITY: ICD-10-CM

## 2025-05-05 DIAGNOSIS — D84.9 IMMUNOSUPPRESSED STATUS (HCC): ICD-10-CM

## 2025-05-05 DIAGNOSIS — Z79.1 NSAID LONG-TERM USE: ICD-10-CM

## 2025-05-05 DIAGNOSIS — M05.9 SEROPOSITIVE RHEUMATOID ARTHRITIS (HCC): Primary | ICD-10-CM

## 2025-05-05 PROCEDURE — 99213 OFFICE O/P EST LOW 20 MIN: CPT | Performed by: STUDENT IN AN ORGANIZED HEALTH CARE EDUCATION/TRAINING PROGRAM

## 2025-05-05 PROCEDURE — 3078F DIAST BP <80 MM HG: CPT | Performed by: STUDENT IN AN ORGANIZED HEALTH CARE EDUCATION/TRAINING PROGRAM

## 2025-05-05 PROCEDURE — 3074F SYST BP LT 130 MM HG: CPT | Performed by: STUDENT IN AN ORGANIZED HEALTH CARE EDUCATION/TRAINING PROGRAM

## 2025-05-05 PROCEDURE — 99417 PROLNG OP E/M EACH 15 MIN: CPT | Performed by: STUDENT IN AN ORGANIZED HEALTH CARE EDUCATION/TRAINING PROGRAM

## 2025-05-05 PROCEDURE — 99215 OFFICE O/P EST HI 40 MIN: CPT | Performed by: STUDENT IN AN ORGANIZED HEALTH CARE EDUCATION/TRAINING PROGRAM

## 2025-05-05 RX ORDER — CERTOLIZUMAB PEGOL 200 MG/ML
200 INJECTION, SOLUTION SUBCUTANEOUS
Qty: 2 ML | Refills: 5 | Status: SHIPPED | OUTPATIENT
Start: 2025-05-05 | End: 2025-05-10

## 2025-05-05 RX ORDER — PREDNISONE 10 MG/1
TABLET ORAL
Qty: 74 TABLET | Refills: 0 | Status: SHIPPED | OUTPATIENT
Start: 2025-05-05

## 2025-05-05 ASSESSMENT — RHEUMATOLOGY NEW PATIENT QUESTIONNAIRE
DURATION: 30-60 MINS
RATE_1TO10: 8
WEAKNESS: Y
HEADACHES: Y
SNORING: Y
FATIGUE: Y
ANXIETY: Y
SORE THROAT: Y
JOINT PAIN: WORSE WITH ACTIVITY
TINGLING: Y
NASAL CONGESTION: Y
CAVITIES: Y
JOINT SWELLING: Y
DIZZINESS: Y
TENDON PAIN: Y
SPASMS: Y
LYMPH NODE SWELLING: NECK
LYMPH NODE SWELLING: EARS
CHARACTERISTIC: BETTER WITH ACTIVITY
HAIR SHEDDING: Y
LYMPH NODE SWELLING: JAWS
VOMITING: Y
TEMPLE PAIN: Y
HEARTBURN: Y
MUSCLE PAIN: Y
MARK ALL THE AREAS OF PAIN: 118997526
BLEEDING GUMS: Y
DEPRESSION: Y
JOINT INSTABILITY: Y
NAUSEA: Y

## 2025-05-05 ASSESSMENT — FIBROSIS 4 INDEX: FIB4 SCORE: 0.3

## 2025-05-05 NOTE — PATIENT INSTRUCTIONS
Thank you for visiting our clinic today.     Summary of your visit:      Follow up in 4 months.     [facility] RHEUMATOLOGY AFTER VISIT GUIDE  Below are important guidelines to help you navigate your health care needs and assist us in caring for you safely and  effectively. We encourage you to carefully read and understand this information and adhere to them accordingly.    [facility] Messaging and Phone Calls:   Diagnosis and Treatment - For a detailed explanation of your condition and treatment plan from today’s visit, refer  to the visit note on [facility] via the following steps:  o Log in to [facility] and click on “Visits” at the top.  o Scroll down to “Past Visits” under Appointments.  o Click on “View Notes” under the appropriate visit date.   Questions or Concerns - [employer] messaging is for non-urgent matters that do not require immediate attention and  should be brief with no more than two questions or concerns. If you have multiple questions or concerns, we ask that  you schedule an appointment to have them properly addressed.   Response to Messages - [facility] messages are addressed throughout the week depending on clinical availability,  so we ask that you allow up to one week for a response.   Phone Calls and Voicemails - Phone calls and voicemail messages are reserved for time-sensitive matters that  cannot wait to be addressed via [facility]. We ask that you refrain from calling the office multiple times or leaving  multiple voicemails regarding the same issue as doing so may lead to delays in response time.   Urgent Issues - For urgent medical matters or medical emergencies that cannot wait, you are advised to go to your  nearest Urgent Care or Emergency Department for immediate attention.    Laboratory Tests and Imaging Studies:   Future Lab and Imaging Orders - We ask that you get your lab tests and imaging studies done no later than one  week before your follow-up visit unless instructed otherwise.   Results  Communication - You may see some test results marked as “abnormal” that are not necessarily significant  or concerning. If there are significant abnormalities on your test results that warrant further action, you will be notified  via MyChart or phone call, otherwise they will be addressed at your follow-up visit.    Prescriptions and Refill Requests:   General Prescriptions (e.g. prednisone, hydroxychloroquine, leflunomide, methotrexate, etc.) - These are sent  to Retail Pharmacies, so all refill requests of these medications should be directed to your local pharmacy.   Specialty Prescriptions (e.g. Enbrel, Humira, Cosentyx, Xeljanz, etc.) - These are sent to Specialty Pharmacies,  so all refill requests of these medications should be directed to your designated specialty pharmacy.   Infusion Prescriptions (e.g. Remicade, Simponi Aria, Rituxan, Saphnelo, etc.) - These are sent to Outpatient  Infusion Centers, so all scheduling requests of these medications should be directed to your local infusion center.    Medication Risks and Adverse Effects:   Immunosuppressed Status - Steroids and antirheumatic drugs are immunosuppressants, so they increase the risk  of infections and can have side effects on various organ systems in your body, though most of them are uncommon.   Potential Side Effects - Be sure to read the drug package inserts to learn about the potential side effects of your  medications before you start taking them and take them exactly as prescribed unless instructed otherwise.   In Case of Side Effects - If you experience any significant side effects, stop taking the medication immediately and  promptly notify the prescriber. Depending on the severity of the side effects, consider going to an Urgent Care or  Emergency Department for immediate attention.    Immunizations and Health Screening:   Vaccinations - If you are on immunosuppressive therapy, it is important that you are up to date on  age-appropriate  immunizations, particularly shingles and pneumonia vaccines, which you can request from your primary care provider  or from us at your next appointment.   Screening Tests - It is also important that you are up to date on age-appropriate screening tests, such as pap  smear, mammography, and colonoscopy, which you can request from your primary care provider.    Educational and Supportive Resources:   [facility] ([email or web address]) - Visit our website to learn more about  your condition and other rheumatic diseases, and gain access to many helpful resources for them.   Disposal of Old Medications (www.[place].gov/everyday-takeback-day) - Visit the Drug Enforcement Administration  website to find a nearby location where you can properly dispose of old medications you no longer need.   Disposal of Used Rice (www.safeneedledisposal.org) - Visit the Safe Needle Disposal Organization website to  find a nearby location where you can properly dispose of used needles from your injectable medications.  Revised 6/14/2024

## 2025-05-05 NOTE — PROGRESS NOTES
[facility] RHEUMATOLOGY  [address], [address], [place] [address]  Phone: [phone] ? Fax: [phone]  [facility]/Health-Services/Rheumatology    NEW PATIENT VISIT NOTE      DATE OF SERVICE: 05/05/2025         Subjective     REFERRING PRACTITIONER:  Berlin Vivas P.A.-C.  [address] [place] [address]    PATIENT IDENTIFICATION:  Lisbeth Hernández  [address] [facility] [address]    YOB: 1995    MEDICAL RECORD NUMBER: [id number]         CHIEF COMPLAINT:   Chief Complaint   Patient presents with    New Patient     Rheumatoid arthritis involving multiple sites with positive rheumatoid factor (HCC)       HISTORY OF PRESENT ILLNESS:  Lisbeth Hernández is a 29 y.o. female with pertinent history notable for iron deficiency anemia, L CTS, prediabetes and depression, who presents to hospitals care for management of rheumatoid arthritis.    Patient was referred to [facility] in 8/2023 for evaluation of rheumatoid arthritis given positive RF of 100 in the context of 6-month history of joint pain that started first on the plantar aspect of the MTP joints.  Prior to her evaluation, was sent to a podiatrist who did steroid injections into the feet however, joint pains progressed few months later to the knees (posterior aspect), shoulders (L >R), fingers (L 2nd PIP joint), and left ankle.  She tried meloxicam but it was ineffective.  Officially diagnosed with rheumatoid arthritis in 2023 and started on DMARD therapy. Last seen by previous rheumatologist Dr. Penn in 1/2025 at which time, was continued on Amjevita only and methotrexate was discontinued as she was considering pregnancy.  Recently have noticed flares of RA in several joints.  In 3/2025, developed joint pain and swelling at the L 4th PIP joint without any preceding trauma.  Reports a 1 month history of joint pain along the R lateral wrist.  3 weeks ago, developed pain in the shoulders and feet (worse with  weightbearing).  She had an MRI of the left wrist in 2/2025 done due to persistent pain and swelling which showed severe tendinopathy and tenosynovitis (no bony erosions). L wrist symptoms have improved.  Continues to have swelling in the left hand (2nd MCP/PIP and 4th PIP joint++).  Reports 1 hour of morning stiffness in the hands.  Denies Raynaud's, photosensitive/malar rash, stroke, DVT/PE, multiple miscarriages (has an 8-year-old son), sicca symptoms, mucosal ulcerations, nonscarring alopecia, psoriasis, pleuritic chest pains or dyspnea on exertion.  Denies history of persistent lymphadenopathy (notes occasional swelling of the lymph nodes around the ears and neck but these have never been swollen permanently).  Denies history of RA related pleural/pericardial effusions, ILD, episodes of red painful photophobic eyes concerning for inflammatory eye disease, or cutaneous vasculitis.  No history of demyelinating disease, lymphoma/leukemia, melanoma, other skin cancers, heart failure, or diverticulitis.  She is on oral contraceptives at this time but has future plans for pregnancy (plans to remove OCP after the summer).  Former smoker, rarely drinks alcohol.  Reports a family history of RA in maternal grandmother.    Reports other aspects of symptoms and medical history as noted on the questionnaire below or scanned under media tab.    Pertinent treatments:  Hydroxychloroquine 400 mg daily: self d/travis due to N/V/D after 2 weeks of taking the medication, but also had pruritus in the hands/feet, fluid retention in feet and hands  Methotrexate 10 mg po weekly 12/2024: d/travis due to GI upset and hair thinning   Certolizumab: effective, but DC'd due to elevated LFTs  Abatacept SQ: lost efficacy after 3-6  Etanercept: lost efficacy  Golimumab: not covered by insurance  Tocilizumab: insurance denied  Adalimumab (Amjevita) 40 mg SQ every 2 weeks: 2/2024- present  Pertinent positive labs: 6/2024, anti-F-actin (37); 12/2023,  anti-F-actin (26); 2023, RF (100)  Pertinent negative labs: 2024, LUDY; 2023, anti-AMA; 2023, hep B/C/QFN gold, anti-SSA/SSB, G6PD; 2023, anti-CCP, LUDY  Pertinent imagin/2025 left wrist MRI: Severe tenosynovitis and tendinopathy of the first dorsal extensor compartment with probable interstitial tear of the extensor pollicis brevis and abductor pollicis longus tendons.  Tenosynovitis throughout the remainder of the extensor tendons along with mild tenosynovitis of the flexor carpal radialis tendon.  Distal radial ulnar and radiocarpal joint effusions without acute bony abnormalities.  No discrete bony erosions.  10/2024 right hand XR: No fractures or significant malalignment  2023 RUQ US: Hepatomegaly with evidence of hepatic steatosis  2023 bilateral hand XR: No evidence of fracture, arthropathy, or erosions    [facility] New Patient History Form    2025  9:04 AM PDT - Filed by Patient   Demographic Information   Marital Status: Single   Occupation: operation coordinator   Highest Level of Education: associates degree   MAIN REASON FOR VISIT long lasting flare up/medication not working   HISTORY OF PRESENT ILLNESS   Date of symptom onset: march   Preceding incident/ailment: left ring finger swelling   Describe/list your symptoms: it started with one finger being swollen, then my left shoulder. back to morning stiffness, and my hands are hurting, not being able to function properly. My feet are swollen and I am having difficulty walking.   Exacerbating factors:    Alleviating factors: cold/hot theraphy   Helpful medications: none   Ineffective medications: amjevita/piroxicam?   Severity of pain (scale of 1-10): 8   Personal/emotional stressors:    Hilario All The Areas Of Pain    REVIEW OF SYMPTOMS    General   Fevers    Chills    Night sweats    Malaise    Fatigue Yes   Unintentional weight loss    Musculoskeletal   Joint pain Worse with activity   Morning stiffness duration 30-60 mins    Morning stiffness characteristic Better with activity   Joint swelling Yes   Joint instability Yes   Tendon pain Yes   Muscle pain Yes   Body aches    Dermatologic   Hair loss with bald spots    Hair shedding Yes   Skin thickening    Skin plaques    Sunlight-induced skin rash    Cold-induced color changes (white, purple, red on rewarming)    Neurologic/Psychiatric   Weakness Yes   Spasms Yes   Tingling Yes   Burning    Numbness    Insomnia    Anxiety Yes   Depression Yes   Head/Eyes   Headaches Yes   Temple pain Yes   Dizziness Yes   Dry eyes    Eye pain    Eye redness    Blurry vision    Vision loss    Ears/Nose   Ear pain    Ringing in ears    Vertigo    Hearing loss    Nasal ulcers    Nosebleeds    Sinus pain    Nasal congestion Yes   Snoring Yes   Mouth/Throat   Oral ulcers    Bleeding gums Yes   Dry mouth    Cavities Yes   Sore throat Yes   Sticking in throat    Difficulty speaking    Neck/Lymphatics   Thyroid pain    Thyroid swelling    Lymph node swelling Ears;  Jaws;  Neck   Cardiac/Respiratory   Chest pain with breathing    Dry cough    Cough with bloody phlegm    Shortness of breath    Fast heartbeats    Irregular heartbeats    Gastrointestinal   Nausea Yes   Vomiting Yes   Difficulty swallowing    Heartburn Yes   Abdominal pain    Bloody stool    Mucus stool    Genitourinary   Pelvic pain    Genital ulcers    Abnormal discharge    Burning urination    Frothy urine    Blood in urine    MEDICAL/PERSONAL HISTORY DETAILS   Current Medical Problems: whats on my chart   Past/Resolved Medical Problems: whats on mm chart   Surgeries/Procedures (include month/year): whats on my chart   Prescription/Over-The-Counter/Herbal Medications: tumeric, apple cider vinegar, fish oil, collagen   Medication/Food/Material Allergies (include reactions):    Immunizations (include month/year)    Alcohol/Tobacco/Recreational Drugs:    Family Medical History (father, mother, siblings only): whats on my chart     REVIEW OF  SYSTEMS:  Except as noted in the history above, relevant review of systems with emphasis on autoimmune rheumatic diseases was otherwise negative.      ACTIVE PROBLEM LIST:  Patient Active Problem List    Diagnosis Date Noted    Moderate episode of recurrent major depressive disorder (HCC) 10/24/2024    Mouth sores 10/17/2024    Rash 10/17/2024    Vaginal irritation 08/28/2024    Prediabetes 03/19/2024    Orthopnea 10/26/2023    Excessive daytime sleepiness 10/26/2023    Rheumatoid arthritis involving multiple sites with positive rheumatoid factor (HCC) 06/01/2023    Obesity (BMI 30-39.9) 04/01/2021    History of abnormal cervical Pap smear 07/22/2020    Iron deficiency 06/18/2020       PAST MEDICAL HISTORY:  Past Medical History:   Diagnosis Date    Cyclothymia     Iron deficiency anemia due to chronic blood loss 6/18/2020       PAST SURGICAL HISTORY:  Past Surgical History:   Procedure Laterality Date    [place] REPAIR OF NASAL SEPTUM  2010    RHINOPLASTY  2009    SUBMANDIBLE GLAND EXCISION      TUMOR EXCISION WITH BIOPSY  2007; 2009    benign tumors under tongue X3 surgeries       MEDICATIONS:  Current Outpatient Medications   Medication Sig    predniSONE (DELTASONE) 10 MG Tab Take 3 tablets every day for 7 days, then decrease by 0.5 tablet every 7 days until finished. Take in the morning with food.    certolizumab pegol (CIMZIA, 2 SYRINGE,) 200 MG/ML Prefilled Syringe Kit Inject 200 mg under the skin every 14 days.    escitalopram (LEXAPRO) 10 MG Tab Take 1 Tablet by mouth every day.    ENSKYCE 0.15-30 MG-MCG per tablet TAKE 1 TABLET BY MOUTH EVERY DAY    pantoprazole (PROTONIX) 40 MG Tablet Delayed Response TAKE 1 TABLET BY MOUTH ONCE A DAY 30 MINUTES BEFORE BREAKFAST MEAL       ALLERGIES:   Allergies   Allergen Reactions    Plaquenil [Hydroxychloroquine] Itching    Sulfa Drugs      Family history of allergy, pt has not taken       IMMUNIZATIONS:   Immunization History   Administered Date(s) Administered    9VHPV  VACCINE 2-3 DOSE IM (GARDASIL 9) 2020    Dtap Vaccine 2012    HPV Quadrivalent Vaccine (GARDASIL) - HISTORICAL DATA 2013, 11/15/2013, 2014    Hepatitis A Vaccine, Ped/Adol 2013, 2014    Hepatitis B Vaccine Adolescent/Pediatric 2010, 2011, 2012    IPV 2012, 2013, 11/15/2013    Influenza Seasonal Injectable - Historical Data 2014    Influenza Vaccine Quad Inj (Pf) 10/29/2019    Influenza Vaccine Quad Nasal 11/15/2013    MMR Vaccine 2012, 2013    Meningococcal Conjugate Vaccine MCV4 (MENVEO) 11/15/2013    PFIZER ROBLES CAP SARS-COV-2 VACCINATION (12+) 2022    PFIZER PURPLE CAP SARS-COV-2 VACCINATION (12+) 2021, 2021    Tdap Vaccine 2013, 2013, 2016    Tuberculin Skin Test 2018, 2020, 08/10/2020, 2022    Varicella Vaccine Live 2012, 2013, 2020       SOCIAL HISTORY:   Social History     Socioeconomic History    Marital status: Single    Number of children: 1    Highest education level: Associate degree: occupational, technical, or vocational program   Tobacco Use    Smoking status: Former     Current packs/day: 0.00     Average packs/day: 0.1 packs/day for 2.0 years (0.2 ttl pk-yrs)     Types: Cigarettes     Start date: 2014     Quit date: 2016     Years since quittin.9    Smokeless tobacco: Never   Vaping Use    Vaping status: Never Used   Substance and Sexual Activity    Alcohol use: Yes     Comment: 2-3 drinks twice a month    Drug use: No    Sexual activity: Not Currently     Partners: Male     Birth control/protection: Condom, Pill     Social Drivers of Health     Financial Resource Strain: Low Risk  (3/28/2023)    Overall Financial Resource Strain (CARDIA)     Difficulty of Paying Living Expenses: Not very hard   Food Insecurity: No Food Insecurity (3/28/2023)    Hunger Vital Sign     Worried About Running Out of Food in the Last Year: Never true      "Ran Out of Food in the Last Year: Never true   Transportation Needs: No Transportation Needs (3/28/2023)    PRAPARE - Transportation     Lack of Transportation (Medical): No     Lack of Transportation (Non-Medical): No   Physical Activity: Inactive (3/28/2023)    Exercise Vital Sign     Days of Exercise per Week: 3 days     Minutes of Exercise per Session: 0 min   Stress: Stress Concern Present (3/28/2023)    [employer] [place] [employer] - Occupational Stress Questionnaire     Feeling of Stress : To some extent   Social Connections: Socially Isolated (3/28/2023)    Social Connection and Isolation Panel [NHANES]     Frequency of Communication with Friends and Family: Once a week     Frequency of Social Gatherings with Friends and Family: Once a week     Attends [employer] Services: Never     Active Member of Clubs or Organizations: No     Attends Club or Organization Meetings: Never     Marital Status: Living with partner   Housing Stability: Low Risk  (3/28/2023)    Housing Stability Vital Sign     Unable to Pay for Housing in the Last Year: No     Number of Places Lived in the Last Year: 1     Unstable Housing in the Last Year: No       FAMILY HISTORY:  Family History   Problem Relation Age of Onset    Psychiatric Illness Mother         bipolar and depression    Hyperlipidemia Paternal Grandmother     Psychiatric Illness Maternal Aunt     Psychiatric Illness Maternal Uncle     Diabetes Paternal Aunt     Hypertension Paternal Aunt     Hyperlipidemia Paternal Aunt     Cancer Neg Hx     Stroke Neg Hx     Heart Disease Neg Hx             Objective     Vital Signs: /72   Pulse 68   Temp 36.4 °C (97.6 °F) (Temporal)   Resp 14   Ht 1.702 m (5' 7\")   Wt 85.3 kg (188 lb)   SpO2 98% Body mass index is 29.44 kg/m².    General: Appears well and comfortable  Eyes: No scleral or conjunctival lesions  ENT: No apparent oral or nasal lesions  Head/Neck: No apparent scalp or neck lesions  Cardiovascular: " Regular rate and rhythm; no pericardial rubs  Respiratory: Breathing quiet and unlabored; no rales or pleural rubs  Gastrointestinal: No apparent organomegaly or abdominal masses  Integumentary: No significant cutaneous lesions such as nodulosis  Musculoskeletal:   No bilateral deviation, boutonniere's, swan-neck or Z- deformities  R wrist: pain with PROM, mild swelling, no significant tenderness or restrictions in ROM  R hand: Mild-moderate swelling at 2-4 MCPs and 2/3 PIP joints with mild tenderness, no significant restrictions in ROM  L wrist: Moderate swelling on the dorsal aspect of the wrist, mild tenderness tenderness, no restrictions in ROM  L hand: Moderate swelling and mild tenderness at 2-4 MCPs and 2-4 PIP joints, no significant  No significant swelling, tenderness, warmth or limitations of motion at other joints examined  Neurologic: No focal sensory or motor deficits  Psychiatric: Mood and affect appropriate      LABORATORY RESULTS REVIEWED AND INTERPRETED BY ME:  Lab Results   Component Value Date/Time    CREACTPROT 1.92 (H) 05/19/2023 07:19 AM    SEDRATEWES 18 12/23/2024 03:37 PM    URICACID 4.3 10/28/2024 09:56 AM     Lab Results   Component Value Date/Time    RHEUMFACTN 100 (H) 05/19/2023 07:19 AM    CCPANTIBODY 10 05/19/2023 07:19 AM     Lab Results   Component Value Date/Time    ANTINUCAB None Detected 06/05/2024 06:58 AM     Lab Results   Component Value Date/Time    SSA60 1 08/18/2023 07:02 AM    SSA52 5 08/18/2023 07:02 AM    ANTISSBSJ 1 08/18/2023 07:02 AM     Lab Results   Component Value Date/Time    ANTIMITOCHO 5.8 11/30/2023 08:28 AM    FACTIN 37 (H) 06/05/2024 06:58 AM     Lab Results   Component Value Date/Time    TSHULTRASEN 1.300 05/19/2023 07:19 AM     Lab Results   Component Value Date/Time    FERRITIN 9.7 (L) 10/27/2023 09:23 AM    IRON 63 09/19/2024 06:47 AM    G6PD 17.6 (H) 08/18/2023 07:02 AM    PROTHROMBTM 14.4 11/21/2013 09:30 AM    INR 1.10 11/21/2013 09:30 AM     Lab Results    Component Value Date/Time    WBC 7.3 12/23/2024 03:37 PM    RBC 4.41 12/23/2024 03:37 PM    HEMOGLOBIN 11.1 (L) 12/23/2024 03:37 PM    HEMATOCRIT 35.2 (L) 12/23/2024 03:37 PM    MCV 79.8 (L) 12/23/2024 03:37 PM    MCH 25.2 (L) 12/23/2024 03:37 PM    MCHC 31.5 (L) 12/23/2024 03:37 PM    RDW 45.4 12/23/2024 03:37 PM    PLATELETCT 435 12/23/2024 03:37 PM    MPV 11.2 12/23/2024 03:37 PM    NEUTS 3.12 12/23/2024 03:37 PM    LYMPHOCYTES 48.80 (H) 12/23/2024 03:37 PM    MONOCYTES 5.80 12/23/2024 03:37 PM    EOSINOPHILS 1.50 12/23/2024 03:37 PM    BASOPHILS 1.00 12/23/2024 03:37 PM     Lab Results   Component Value Date/Time    ASTSGOT 23 12/23/2024 03:37 PM    ALTSGPT 26 12/23/2024 03:37 PM    ALKPHOSPHAT 60 12/23/2024 03:37 PM    TBILIRUBIN 0.4 12/23/2024 03:37 PM    TOTPROTEIN 7.5 12/23/2024 03:37 PM    ALBUMIN 4.3 12/23/2024 03:37 PM     Lab Results   Component Value Date/Time    SODIUM 136 12/23/2024 03:37 PM    POTASSIUM 3.8 12/23/2024 03:37 PM    CHLORIDE 103 12/23/2024 03:37 PM    CO2 20 12/23/2024 03:37 PM    GLUCOSE 81 12/23/2024 03:37 PM    BUN 12 12/23/2024 03:37 PM    CREATININE 0.58 12/23/2024 03:37 PM    CALCIUM 8.5 12/23/2024 03:37 PM     Lab Results   Component Value Date/Time    COLORURINE Yellow 02/21/2024 11:45 AM    SPECGRAVITY 1.009 02/21/2024 11:45 AM    PHURINE 6.5 02/21/2024 11:45 AM    GLUCOSEUR Negative 02/21/2024 11:45 AM    KETONES Negative 02/21/2024 11:45 AM    PROTEINURIN Negative 02/21/2024 11:45 AM     Lab Results   Component Value Date/Time    HEPBSAG Non-Reactive 08/18/2023 07:02 AM    HEPBCORIGM Non-Reactive 08/18/2023 07:02 AM    HEPCAB Non-Reactive 08/18/2023 07:02 AM     Lab Results   Component Value Date/Time    CHOLSTRLTOT 162 03/17/2022 07:35 AM    LDL 74 03/17/2022 07:35 AM    HDL 53 03/17/2022 07:35 AM    TRIGLYCERIDE 174 (H) 03/17/2022 07:35 AM    HBA1C 5.7 (H) 03/18/2024 03:56 PM       RADIOLOGY RESULTS REVIEWED AND INTERPRETED BY ME: See above      All relevant laboratory  and imaging results reported on this note were reviewed and interpreted by me.         Assessment & Plan     Lisbeth Hernández is a 29 y.o. female with history as noted above whose presentation merits the following clinical impressions and recommendations:    1. Seropositive rheumatoid arthritis (HCC)  RF (100)  Nonerosive disease.  Diagnosed with rheumatoid arthritis in 2023 when she presented with polyarthritis.  She has been on multiple conventional and biologic DMARD therapy, most were ineffective.  Presently on adalimumab (Amjevita) which seems to be ineffective as she has signs of active synovitis on exam today.  Suspect that she may have developed antidrug antibodies to adalimumab so will check that as below.  Reasonable to start a short course of steroid for more immediate relief and transition to certolizumab (previously effective) especially given future plans for pregnancy.  Close monitoring of liver enzymes while on this medication as it was previously discontinued due to elevated LFTs.  - Start certolizumab pegol (CIMZIA, 2 SYRINGE,) 200 MG/ML Prefilled Syringe Kit; Inject 200 mg under the skin every 14 days.  Dispense: 2 mL; Refill: 5  - Start predniSONE (DELTASONE) 10 MG Tab; Take 3 tablets every day for 7 days, then decrease by 0.5 tablet every 7 days until finished. Take in the morning with food.  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  The presence of this antibody indicates risk for autoimmune liver disease. If she develops acute hepatitis and/or abnormality on liver imaging, referral to GI will be appropriate.  Nothing to do at this time from rheumatology standpoint.     3. Immunosuppressed status (HCC)  Presently with no history, physical, or laboratory evidence to suggest significant adverse drug effects or opportunistic infections, but need routine monitoring per guidelines.    4. NSAID long-term use  Advised to avoid NSAIDs while on steroid treatment.    The above assessment and plan were discussed with the patient who acknowledged understanding of the plan.    FOLLOW-UP: Return in about 4 months (around 9/5/2025).    Note: More than 75 minutes were spent on this encounter, including extensive chart review for data acquisition and interpretation, educating and counseling of the patient about diagnosis, treatment, and prognosis, and literature review for updated diagnostic and treatment guidelines.         Thank you for the opportunity to participate in the care of Lisbeth Hernández.    Ines Berumen D.O.  Rheumatologist, [facility] Rheumatology, [facility]

## 2025-05-06 ENCOUNTER — TELEPHONE (OUTPATIENT)
Dept: RHEUMATOLOGY | Facility: MEDICAL CENTER | Age: 30
End: 2025-05-06
Payer: MEDICAID

## 2025-05-06 NOTE — TELEPHONE ENCOUNTER
Prior Authorization for Cimzia (2 Syringe) 200MG/ML syringe kit has been denied for a quantity of 2 , day supply 28    Prior authorization was denied per the following:     We denied your request for the following reason:  because we did not see what we need to approve the drug you asked for, (Cimzia). We may be able to approve this drug when we see certain records (two preferred biologic drugs [the preferred biologic drugs include: Amjevita (adalimumab-atto), Avsola (infliximab-axxq), Hadlima (adalimumab-bwwd), Simlandi - brand] they did not work well for you or caused side effects that you could not tolerate; records   describing how the drug did not work for you or what side effects were caused for each drug tried.    Prior Authorization denial reference number: [id number]  Insurance: [employer]    Appeal submitted over the phone.   Case#: [id number]  Fax #: [phone]

## 2025-05-06 NOTE — TELEPHONE ENCOUNTER
Prior Authorization for Cimzia (2 Syringe) 200MG/ML syringe kit (Quantity: 2, Days: 28) has been submitted via Cover My Meds: Key ([id number])    Insurance: [employer]    Will follow up in 24-48 business hours.

## 2025-05-08 ENCOUNTER — PATIENT MESSAGE (OUTPATIENT)
Dept: RHEUMATOLOGY | Facility: MEDICAL CENTER | Age: 30
End: 2025-05-08
Payer: MEDICAID

## 2025-05-09 NOTE — TELEPHONE ENCOUNTER
Prior Authorization for Cimzia (2 Syringe) 200MG/ML syringe kit  has been approved for a quantity of 2 , day supply 28    Prior Authorization reference number: [id number]  Insurance: [employer]  Effective dates: 05/09/2025 to 05/09/2026  Copay: $0    Next Steps:  send to [facility] per patient's request.

## 2025-05-10 DIAGNOSIS — M05.9 SEROPOSITIVE RHEUMATOID ARTHRITIS (HCC): ICD-10-CM

## 2025-05-10 RX ORDER — CERTOLIZUMAB PEGOL 200 MG/ML
200 INJECTION, SOLUTION SUBCUTANEOUS
Qty: 2 ML | Refills: 5 | Status: SHIPPED | OUTPATIENT
Start: 2025-05-10 | End: 2025-05-13

## 2025-05-12 ENCOUNTER — PATIENT MESSAGE (OUTPATIENT)
Dept: RHEUMATOLOGY | Facility: MEDICAL CENTER | Age: 30
End: 2025-05-12
Payer: MEDICAID

## 2025-05-12 ENCOUNTER — TELEPHONE (OUTPATIENT)
Dept: RHEUMATOLOGY | Facility: MEDICAL CENTER | Age: 30
End: 2025-05-12
Payer: MEDICAID

## 2025-05-13 ENCOUNTER — APPOINTMENT (OUTPATIENT)
Dept: URGENT CARE | Facility: CLINIC | Age: 30
End: 2025-05-13
Payer: MEDICAID

## 2025-05-13 ENCOUNTER — OFFICE VISIT (OUTPATIENT)
Dept: URGENT CARE | Facility: CLINIC | Age: 30
End: 2025-05-13
Payer: MEDICAID

## 2025-05-13 ENCOUNTER — HOSPITAL ENCOUNTER (OUTPATIENT)
Facility: MEDICAL CENTER | Age: 30
End: 2025-05-13
Attending: FAMILY MEDICINE
Payer: MEDICAID

## 2025-05-13 VITALS
SYSTOLIC BLOOD PRESSURE: 122 MMHG | HEIGHT: 67 IN | BODY MASS INDEX: 30.23 KG/M2 | WEIGHT: 192.6 LBS | OXYGEN SATURATION: 98 % | RESPIRATION RATE: 20 BRPM | TEMPERATURE: 98.1 F | DIASTOLIC BLOOD PRESSURE: 82 MMHG | HEART RATE: 83 BPM

## 2025-05-13 DIAGNOSIS — N89.8 VAGINAL IRRITATION: ICD-10-CM

## 2025-05-13 LAB
APPEARANCE UR: CLEAR
BILIRUB UR STRIP-MCNC: NEGATIVE MG/DL
COLOR UR AUTO: NORMAL
GLUCOSE UR STRIP.AUTO-MCNC: NEGATIVE MG/DL
KETONES UR STRIP.AUTO-MCNC: NEGATIVE MG/DL
LEUKOCYTE ESTERASE UR QL STRIP.AUTO: NEGATIVE
NITRITE UR QL STRIP.AUTO: NEGATIVE
PH UR STRIP.AUTO: 6 [PH] (ref 5–8)
POCT INT CON NEG: NEGATIVE
POCT INT CON POS: POSITIVE
POCT URINE PREGNANCY TEST: NEGATIVE
PROT UR QL STRIP: NEGATIVE MG/DL
RBC UR QL AUTO: NEGATIVE
SP GR UR STRIP.AUTO: 1.02
UROBILINOGEN UR STRIP-MCNC: NORMAL MG/DL

## 2025-05-13 PROCEDURE — 99213 OFFICE O/P EST LOW 20 MIN: CPT | Performed by: FAMILY MEDICINE

## 2025-05-13 PROCEDURE — 81002 URINALYSIS NONAUTO W/O SCOPE: CPT | Performed by: FAMILY MEDICINE

## 2025-05-13 PROCEDURE — 3074F SYST BP LT 130 MM HG: CPT | Performed by: FAMILY MEDICINE

## 2025-05-13 PROCEDURE — 3079F DIAST BP 80-89 MM HG: CPT | Performed by: FAMILY MEDICINE

## 2025-05-13 PROCEDURE — 87480 CANDIDA DNA DIR PROBE: CPT

## 2025-05-13 PROCEDURE — 87510 GARDNER VAG DNA DIR PROBE: CPT

## 2025-05-13 PROCEDURE — 81025 URINE PREGNANCY TEST: CPT | Performed by: FAMILY MEDICINE

## 2025-05-13 PROCEDURE — 87660 TRICHOMONAS VAGIN DIR PROBE: CPT

## 2025-05-13 ASSESSMENT — FIBROSIS 4 INDEX: FIB4 SCORE: 0.3

## 2025-05-13 NOTE — PROGRESS NOTES
"  Subjective:      29 y.o. female presents to urgent care vaginal irritation that started yesterday. This is a recurrent thing that occurs when she takes steroids. She started Prednisone last week to treat her rheumatoid arthritis.  Since yesterday she has been experiencing vaginal dryness, irritation, and discharge.  No change in odor.  She does endorse dysuria without any change in urinary urgency, frequency, or hematuria.  She is currently sexually active one, male partner and she uses OCPs for contraception.    She denies any other questions or concerns at this time.    Current problem list, medication, and past medical/surgical history were reviewed in Epic.    ROS  See HPI     Objective:      /82   Pulse 83   Temp 36.7 °C (98.1 °F) (Temporal)   Resp 20   Ht 1.702 m (5' 7\")   Wt 87.4 kg (192 lb 9.6 oz)   SpO2 98%   BMI 30.17 kg/m²     Physical Exam  Constitutional:       General: She is not in acute distress.     Appearance: She is not diaphoretic.   Cardiovascular:      Rate and Rhythm: Normal rate and regular rhythm.      Heart sounds: Normal heart sounds.   Pulmonary:      Effort: Pulmonary effort is normal. No respiratory distress.      Breath sounds: Normal breath sounds.   Neurological:      Mental Status: She is alert.   Psychiatric:         Mood and Affect: Affect normal.         Judgment: Judgment normal.       Assessment/Plan:     1. Vaginal irritation  hCG negative.  No sign of infection on urinalysis.  DNA vaginal pathogen swab has been sent.  Will treat appropriately once resulted.  - POCT Urinalysis  - POCT PREGNANCY  - VAGINAL PATHOGENS DNA PANEL; Future      Instructed to return to Urgent Care or nearest Emergency Department if symptoms fail to improve, for any change in condition, further concerns, or new concerning symptoms. Patient states understanding of the plan of care and discharge instructions.    Jessica Ramos M.D.   "

## 2025-05-14 ENCOUNTER — RESULTS FOLLOW-UP (OUTPATIENT)
Dept: URGENT CARE | Facility: PHYSICIAN GROUP | Age: 30
End: 2025-05-14

## 2025-05-14 DIAGNOSIS — B96.89 BV (BACTERIAL VAGINOSIS): ICD-10-CM

## 2025-05-14 DIAGNOSIS — B37.31 YEAST VAGINITIS: Primary | ICD-10-CM

## 2025-05-14 DIAGNOSIS — N76.0 BV (BACTERIAL VAGINOSIS): ICD-10-CM

## 2025-05-14 LAB
CANDIDA DNA VAG QL PROBE+SIG AMP: POSITIVE
G VAGINALIS DNA VAG QL PROBE+SIG AMP: POSITIVE
T VAGINALIS DNA VAG QL PROBE+SIG AMP: NEGATIVE

## 2025-05-14 RX ORDER — METRONIDAZOLE 500 MG/1
500 TABLET ORAL 2 TIMES DAILY
Qty: 14 TABLET | Refills: 0 | Status: SHIPPED | OUTPATIENT
Start: 2025-05-14 | End: 2025-05-21

## 2025-05-14 RX ORDER — FLUCONAZOLE 150 MG/1
150 TABLET ORAL
Qty: 2 TABLET | Refills: 0 | Status: SHIPPED | OUTPATIENT
Start: 2025-05-14

## 2025-05-15 ENCOUNTER — HOSPITAL ENCOUNTER (OUTPATIENT)
Dept: RADIOLOGY | Facility: MEDICAL CENTER | Age: 30
End: 2025-05-15
Attending: STUDENT IN AN ORGANIZED HEALTH CARE EDUCATION/TRAINING PROGRAM
Payer: MEDICAID

## 2025-05-15 ENCOUNTER — HOSPITAL ENCOUNTER (OUTPATIENT)
Facility: MEDICAL CENTER | Age: 30
End: 2025-05-15
Attending: STUDENT IN AN ORGANIZED HEALTH CARE EDUCATION/TRAINING PROGRAM
Payer: MEDICAID

## 2025-05-15 DIAGNOSIS — M05.9 SEROPOSITIVE RHEUMATOID ARTHRITIS (HCC): ICD-10-CM

## 2025-05-15 LAB
ALBUMIN SERPL BCP-MCNC: 3.9 G/DL (ref 3.2–4.9)
ALBUMIN/GLOB SERPL: 1.1 G/DL
ALP SERPL-CCNC: 51 U/L (ref 30–99)
ALT SERPL-CCNC: 22 U/L (ref 2–50)
ANION GAP SERPL CALC-SCNC: 11 MMOL/L (ref 7–16)
AST SERPL-CCNC: 18 U/L (ref 12–45)
BASOPHILS # BLD AUTO: 1 % (ref 0–1.8)
BASOPHILS # BLD: 0.07 K/UL (ref 0–0.12)
BILIRUB SERPL-MCNC: 0.4 MG/DL (ref 0.1–1.5)
BUN SERPL-MCNC: 11 MG/DL (ref 8–22)
CALCIUM ALBUM COR SERPL-MCNC: 8.8 MG/DL (ref 8.5–10.5)
CALCIUM SERPL-MCNC: 8.7 MG/DL (ref 8.5–10.5)
CHLORIDE SERPL-SCNC: 103 MMOL/L (ref 96–112)
CO2 SERPL-SCNC: 25 MMOL/L (ref 20–33)
CREAT SERPL-MCNC: 0.63 MG/DL (ref 0.5–1.4)
EOSINOPHIL # BLD AUTO: 0.09 K/UL (ref 0–0.51)
EOSINOPHIL NFR BLD: 1.3 % (ref 0–6.9)
ERYTHROCYTE [DISTWIDTH] IN BLOOD BY AUTOMATED COUNT: 46.9 FL (ref 35.9–50)
FASTING STATUS PATIENT QL REPORTED: NORMAL
GFR SERPLBLD CREATININE-BSD FMLA CKD-EPI: 122 ML/MIN/1.73 M 2
GLOBULIN SER CALC-MCNC: 3.4 G/DL (ref 1.9–3.5)
GLUCOSE SERPL-MCNC: 87 MG/DL (ref 65–99)
HCT VFR BLD AUTO: 36.8 % (ref 37–47)
HGB BLD-MCNC: 11.2 G/DL (ref 12–16)
IMM GRANULOCYTES # BLD AUTO: 0.02 K/UL (ref 0–0.11)
IMM GRANULOCYTES NFR BLD AUTO: 0.3 % (ref 0–0.9)
LYMPHOCYTES # BLD AUTO: 3.29 K/UL (ref 1–4.8)
LYMPHOCYTES NFR BLD: 47.8 % (ref 22–41)
MCH RBC QN AUTO: 22.8 PG (ref 27–33)
MCHC RBC AUTO-ENTMCNC: 30.4 G/DL (ref 32.2–35.5)
MCV RBC AUTO: 74.8 FL (ref 81.4–97.8)
MONOCYTES # BLD AUTO: 0.49 K/UL (ref 0–0.85)
MONOCYTES NFR BLD AUTO: 7.1 % (ref 0–13.4)
NEUTROPHILS # BLD AUTO: 2.92 K/UL (ref 1.82–7.42)
NEUTROPHILS NFR BLD: 42.5 % (ref 44–72)
NRBC # BLD AUTO: 0 K/UL
NRBC BLD-RTO: 0 /100 WBC (ref 0–0.2)
PLATELET # BLD AUTO: 452 K/UL (ref 164–446)
PMV BLD AUTO: 10.8 FL (ref 9–12.9)
POTASSIUM SERPL-SCNC: 4 MMOL/L (ref 3.6–5.5)
PROT SERPL-MCNC: 7.3 G/DL (ref 6–8.2)
RBC # BLD AUTO: 4.92 M/UL (ref 4.2–5.4)
SODIUM SERPL-SCNC: 139 MMOL/L (ref 135–145)
WBC # BLD AUTO: 6.9 K/UL (ref 4.8–10.8)

## 2025-05-15 PROCEDURE — 82397 CHEMILUMINESCENT ASSAY: CPT

## 2025-05-15 PROCEDURE — 80145 DRUG ASSAY ADALIMUMAB: CPT

## 2025-05-15 PROCEDURE — 83520 IMMUNOASSAY QUANT NOS NONAB: CPT

## 2025-05-15 PROCEDURE — 73120 X-RAY EXAM OF HAND: CPT | Mod: RT

## 2025-05-15 PROCEDURE — 85025 COMPLETE CBC W/AUTO DIFF WBC: CPT

## 2025-05-15 PROCEDURE — 36415 COLL VENOUS BLD VENIPUNCTURE: CPT

## 2025-05-15 PROCEDURE — 80053 COMPREHEN METABOLIC PANEL: CPT

## 2025-05-15 PROCEDURE — 73620 X-RAY EXAM OF FOOT: CPT | Mod: RT

## 2025-05-15 PROCEDURE — 83516 IMMUNOASSAY NONANTIBODY: CPT

## 2025-05-16 LAB — CARBAMYLATED PROTEIN ANTIBODY, IGG Q6043: 7 UNITS (ref 0–19)

## 2025-05-18 LAB
ADALIMUMAB AB SERPL IA-MCNC: 191 NG/ML
ADALIMUMAB SERPL IA-MCNC: <0.4 UG/ML

## 2025-05-19 ENCOUNTER — RESULTS FOLLOW-UP (OUTPATIENT)
Dept: RHEUMATOLOGY | Facility: MEDICAL CENTER | Age: 30
End: 2025-05-19

## 2025-05-19 DIAGNOSIS — M05.9 SEROPOSITIVE RHEUMATOID ARTHRITIS (HCC): ICD-10-CM

## 2025-05-19 NOTE — TELEPHONE ENCOUNTER
Cimzia was discontinued by urgent care provider. Dr. Berumen or Clementina, can you refill? I sent refill request to be sent to [facility].

## 2025-05-21 ENCOUNTER — DOCUMENTATION (OUTPATIENT)
Dept: PHARMACY | Facility: MEDICAL CENTER | Age: 30
End: 2025-05-21
Payer: MEDICAID

## 2025-05-21 PROCEDURE — RXMED WILLOW AMBULATORY MEDICATION CHARGE: Performed by: NURSE PRACTITIONER

## 2025-05-21 RX ORDER — CERTOLIZUMAB PEGOL 200 MG/ML
200 INJECTION, SOLUTION SUBCUTANEOUS
Qty: 2 ML | Refills: 5 | Status: SHIPPED | OUTPATIENT
Start: 2025-05-21

## 2025-05-21 NOTE — PROGRESS NOTES
PHARMACIST CLINICAL ONBOARDING - Clinical Onboarding -   Result = Complete, Next card status: Transfer/Restart    Therapeutic Category: Rheumatoid Arthritis  ICD10: M059  Diagnosis Details: Seropositive rheumatoid arthritis [M05.9]  Medication(s) associated with Therapeutic Category: Cimzia (2 Syringe) Prefilled Syringe Kit 200 MG/ML  Medication(s) prescribed for FDA approved indication?   -Cimzia (2 Syringe) Prefilled Syringe Kit 200 MG/ML: Yes  Full drug therapy: Cimzia 200mg/mL pre-filled syringe injecting 1mL (200mg) under the skin every 14 days + [Prednisone taper]  Past treatments: Amjevita, Actemra, Enbrel, Orencia, Cimzia, Prednisone, Hydroxychloroquine, NSAIDs  Goals of therapy: Promote or maintain optimal therapy administration and adherence, Mitigation of side effects, Decrease disease activity (RAPID-3), Prevent and reduce flares and inflammation     Regimen Appropriateness Review: Rheumatoid Arthritis  Any concerning drug and/or non-drug allergies? No  Any concerning drug interactions (drug-drug or dietary)? No  Any concern with prescribed dose (appropriateness, renal, and hepatic adjustments needed)? No  Any comorbidities, past medical history, precautions, or contraindications that pose a medication safety concern? No  Any relevant lab work needed that affects the initial start date? No  Any issues with patient's ability to self-administer medication? No      PHARMACIST CLINICAL MANAGEMENT - INITIAL CLINICAL ASSESSMENT -   Result = Complete, Next card status: Stable/Non-Priority Follow Up    INITIAL CLINICAL ASSESSMENT  Cimzia (2 Syringe) Prefilled Syringe Kit 200 MG/ML - Rheumatoid Arthritis - Restart    Encounter Information: Telephonic assessment, 05/21/2025, Patient    SUBJECTIVE  Signs and symptoms of condition   -Rheumatoid Arthritis: Reviewed, pain/tenderness at wrists     Expected adherence   -Cimzia (2 Syringe) Prefilled Syringe Kit 200 MG/ML: No adherence concerns    Functional Limitations:  None    High risk features: None    Communicable infections: No communicable infections    OBJECTIVE  Clinically relevant medication and/or disease state labs   -Rheumatoid Arthritis: Reviewed,   # of flares (last 6 months): 3; notified rheumatologist for flares and prescribed Prednisone   Pain scale (avg last week): 3/10  Rapid3 score (RA only): DEFER; pt did not have time during restart counseling call   Clinically Relevant, Abnormal Labs from 5/15/25 unless otherwise noted:    - CBC: HGB 11.2 (L), HCT 36.8 (L), MCV 74.8 (L), MCH 22.8 (L), MCHC 30.4 (L),  (H)   - Chem7: WNL    - LFTs: WNL    - ESR from 12/23/24: 18   - CRP: no recent results    - Vitamin D: no recent results    - TB status: negative (8/18/23)    - HBsAg: nonreactive (8/18/23)   - HBcAb: nonreactive (8/18/23)    - HepC: negative (8/18/23)    - BP/HR: WNL (5/13/25)    ASSESSMENT  Drug therapy, administration, and proper use   -Cimzia (2 Syringe) Prefilled Syringe Kit 200 MG/ML: Reviewed, Cimzia 200mg/mL pre-filled syringe injecting 1mL (200mg) under the skin every 14 days + [Prednisone taper]   - Administration: patient is restarting Cimzia; declined full administration review; reminded to inject at a 45 degree angle     Proper handling and disposal   -Cimzia (2 Syringe) Prefilled Syringe Kit 200 MG/ML: Reviewed, Reviewed to dispose of in sharps or puncture resistant container     Storage and excursion   -Cimzia (2 Syringe) Prefilled Syringe Kit 200 MG/ML: Reviewed, Refrigerate carton between 2 to 8 °C (36 to 46 °F). Do not freeze. Protect solution from light. When necessary, CIMZIA prefilled syringes may be stored at room temperature up to 77 °F (25 °C) in the original carton to protect from light for a single period of up to 7 days. Once a CIMZIA prefilled syringe has been stored at room temperature, do not place back in refrigerator. Write the date removed from the refrigerator in the space provided on the carton and discard if not used  within the 7-day period.    Adherence strategies   -Cimzia (2 Syringe) Prefilled Syringe Kit 200 MG/ML: Reviewed, no adherence concerns; recommended to inject every 2 weeks i.e. every other Saturday     Missed dose management   -Cimzia (2 Syringe) Prefilled Syringe Kit 200 MG/ML: Reviewed, If you miss a dose of this medicine, take it as soon as possible. Do not double doses.    Drug Interruptions/Hold (Infection, Abx Use): hold for infection or surgery, restart to be determined by provider to ensure full recovery from surgery/illness    Potential common side effects   -Cimzia (2 Syringe) Prefilled Syringe Kit 200 MG/ML: Patient declined    Potential common side effect mitigation strategies   -Cimzia (2 Syringe) Prefilled Syringe Kit 200 MG/ML: Patient declined    Serious side effects, precautions, and contraindications   -Cimzia (2 Syringe) Prefilled Syringe Kit 200 MG/ML: Patient declined    Non-pharmacologic drug and disease state counseling   -Rheumatoid Arthritis: Clinician deferred    Relevant immunization recommendations: Clinician deferred    In the last 4 weeks has the patient missed any days of work, school, or planned activities due to their condition?   -Rheumatoid Arthritis: Unable to assess    Medication list reconciliation: Reviewed-EMR accurate    When to report changes or new additions to medication list: Educated patient    Clinically significant drug interactions: No drug interactions identified    Common drug interactions and dietary avoidance   -Cimzia (2 Syringe) Prefilled Syringe Kit 200 MG/ML: Reviewed, avoid live vaccines     PLAN  Anticipated or confirmed medication start date   -Cimzia (2 Syringe) Prefilled Syringe Kit 200 MG/ML: 05/23/2025    Intended duration of medication   -Cimzia (2 Syringe) Prefilled Syringe Kit 200 MG/ML: Reviewed, indefinite, or until txt failure/intolerable side effects     Goals of therapy   -Rheumatoid Arthritis: Promote or maintain optimal therapy administration  and adherence, Mitigation of side effects, Decrease disease activity (RAPID-3), Prevent and reduce flares and inflammation    Understanding/agreement of the goals of therapy   -Rheumatoid Arthritis: Agrees/understands goals of therapy    Reasons for selecting patient declined, clinician deferred, or unable to assess: patient declined review of side effects/warnings with past medication use; deferred immunizations, wellness/lifestyle, ADLs as patient asked to end call early    Additional summary notes: Spoke with Lisbeth briefly for restart counseling on Cimzia. She is changing therapy from Amjevita as medication was ineffective (developed antibodies to Adalimumab). She was short on time where several counseling points were deferred to future assessments. She was to inject Amjevita on 5/17/25 however she did not have a dose on hand with the therpay change to Cimzia. Advised she can should start Cimzia ASAP where she plans to start 5/24/25 as every other Saturday is her preferred injection schedule. She noted Prednisone has been beneficial to help alleviate majority of her wrist/hand discomfort. She asked if Cimzia may cause elevated liver enzymes where reviewed medication labeling which does have warnings for elevated liver enzymes, hepatitis, and hepatitis B reactivation. She did experience elevated LFTs in the past with Cimzia use leading to discontinuation where she is aware of the importance to complete routine labwork for close monitoring. Lisbeth needed to end the call however she does welcome future outreach from pharmacy team for medication support.

## 2025-05-22 LAB — TEST NAME 95000: ABNORMAL

## 2025-05-23 ENCOUNTER — PHARMACY VISIT (OUTPATIENT)
Dept: PHARMACY | Facility: MEDICAL CENTER | Age: 30
End: 2025-05-23
Payer: COMMERCIAL

## 2025-05-24 LAB — TEST NAME 95000: NORMAL

## 2025-05-30 DIAGNOSIS — M05.9 SEROPOSITIVE RHEUMATOID ARTHRITIS (HCC): ICD-10-CM

## 2025-05-31 RX ORDER — PREDNISONE 10 MG/1
TABLET ORAL
Qty: 74 TABLET | Refills: 0 | OUTPATIENT
Start: 2025-05-31

## 2025-06-16 PROCEDURE — RXMED WILLOW AMBULATORY MEDICATION CHARGE: Performed by: NURSE PRACTITIONER

## 2025-06-20 ENCOUNTER — PHARMACY VISIT (OUTPATIENT)
Dept: PHARMACY | Facility: MEDICAL CENTER | Age: 30
End: 2025-06-20
Payer: COMMERCIAL

## 2025-06-25 ENCOUNTER — APPOINTMENT (OUTPATIENT)
Dept: RADIOLOGY | Facility: MEDICAL CENTER | Age: 30
End: 2025-06-25
Attending: EMERGENCY MEDICINE
Payer: MEDICAID

## 2025-06-25 ENCOUNTER — HOSPITAL ENCOUNTER (EMERGENCY)
Facility: MEDICAL CENTER | Age: 30
End: 2025-06-25
Attending: EMERGENCY MEDICINE
Payer: MEDICAID

## 2025-06-25 VITALS
WEIGHT: 194 LBS | HEART RATE: 67 BPM | SYSTOLIC BLOOD PRESSURE: 103 MMHG | HEIGHT: 67 IN | TEMPERATURE: 98.5 F | RESPIRATION RATE: 16 BRPM | DIASTOLIC BLOOD PRESSURE: 56 MMHG | OXYGEN SATURATION: 98 % | BODY MASS INDEX: 30.45 KG/M2

## 2025-06-25 DIAGNOSIS — N73.9 PELVIC INFLAMMATORY DISEASE: Primary | ICD-10-CM

## 2025-06-25 LAB
ALBUMIN SERPL BCP-MCNC: 4.4 G/DL (ref 3.2–4.9)
ALBUMIN/GLOB SERPL: 1.2 G/DL
ALP SERPL-CCNC: 71 U/L (ref 30–99)
ALT SERPL-CCNC: 33 U/L (ref 2–50)
ANION GAP SERPL CALC-SCNC: 13 MMOL/L (ref 7–16)
APPEARANCE UR: CLEAR
AST SERPL-CCNC: 37 U/L (ref 12–45)
BACTERIA GENITAL QL WET PREP: NORMAL
BASOPHILS # BLD AUTO: 0.8 % (ref 0–1.8)
BASOPHILS # BLD: 0.05 K/UL (ref 0–0.12)
BILIRUB SERPL-MCNC: 0.7 MG/DL (ref 0.1–1.5)
BILIRUB UR QL STRIP.AUTO: NEGATIVE
BUN SERPL-MCNC: 9 MG/DL (ref 8–22)
C TRACH DNA GENITAL QL NAA+PROBE: NEGATIVE
CALCIUM ALBUM COR SERPL-MCNC: 9 MG/DL (ref 8.5–10.5)
CALCIUM SERPL-MCNC: 9.3 MG/DL (ref 8.5–10.5)
CHLORIDE SERPL-SCNC: 102 MMOL/L (ref 96–112)
CO2 SERPL-SCNC: 23 MMOL/L (ref 20–33)
COLOR UR: YELLOW
CREAT SERPL-MCNC: 0.64 MG/DL (ref 0.5–1.4)
EOSINOPHIL # BLD AUTO: 0.09 K/UL (ref 0–0.51)
EOSINOPHIL NFR BLD: 1.4 % (ref 0–6.9)
ERYTHROCYTE [DISTWIDTH] IN BLOOD BY AUTOMATED COUNT: 48.2 FL (ref 35.9–50)
GFR SERPLBLD CREATININE-BSD FMLA CKD-EPI: 122 ML/MIN/1.73 M 2
GLOBULIN SER CALC-MCNC: 3.6 G/DL (ref 1.9–3.5)
GLUCOSE SERPL-MCNC: 92 MG/DL (ref 65–99)
GLUCOSE UR STRIP.AUTO-MCNC: NEGATIVE MG/DL
HCG SERPL QL: NEGATIVE
HCT VFR BLD AUTO: 38.7 % (ref 37–47)
HGB BLD-MCNC: 11.7 G/DL (ref 12–16)
IMM GRANULOCYTES # BLD AUTO: 0.03 K/UL (ref 0–0.11)
IMM GRANULOCYTES NFR BLD AUTO: 0.5 % (ref 0–0.9)
KETONES UR STRIP.AUTO-MCNC: NEGATIVE MG/DL
LEUKOCYTE ESTERASE UR QL STRIP.AUTO: NEGATIVE
LIPASE SERPL-CCNC: 54 U/L (ref 11–82)
LYMPHOCYTES # BLD AUTO: 2.23 K/UL (ref 1–4.8)
LYMPHOCYTES NFR BLD: 35.2 % (ref 22–41)
MCH RBC QN AUTO: 23 PG (ref 27–33)
MCHC RBC AUTO-ENTMCNC: 30.2 G/DL (ref 32.2–35.5)
MCV RBC AUTO: 76.2 FL (ref 81.4–97.8)
MICRO URNS: NORMAL
MONOCYTES # BLD AUTO: 0.42 K/UL (ref 0–0.85)
MONOCYTES NFR BLD AUTO: 6.6 % (ref 0–13.4)
N GONORRHOEA DNA GENITAL QL NAA+PROBE: NEGATIVE
NEUTROPHILS # BLD AUTO: 3.51 K/UL (ref 1.82–7.42)
NEUTROPHILS NFR BLD: 55.5 % (ref 44–72)
NITRITE UR QL STRIP.AUTO: NEGATIVE
NRBC # BLD AUTO: 0 K/UL
NRBC BLD-RTO: 0 /100 WBC (ref 0–0.2)
PH UR STRIP.AUTO: 6 [PH] (ref 5–8)
PLATELET # BLD AUTO: 391 K/UL (ref 164–446)
PMV BLD AUTO: 10.3 FL (ref 9–12.9)
POTASSIUM SERPL-SCNC: 3.4 MMOL/L (ref 3.6–5.5)
PROT SERPL-MCNC: 8 G/DL (ref 6–8.2)
PROT UR QL STRIP: NEGATIVE MG/DL
RBC # BLD AUTO: 5.08 M/UL (ref 4.2–5.4)
RBC UR QL AUTO: NEGATIVE
SIGNIFICANT IND 70042: NORMAL
SITE SITE: NORMAL
SODIUM SERPL-SCNC: 138 MMOL/L (ref 135–145)
SOURCE SOURCE: NORMAL
SP GR UR STRIP.AUTO: 1.01
SPECIMEN SOURCE: NORMAL
UROBILINOGEN UR STRIP.AUTO-MCNC: 0.2 EU/DL
WBC # BLD AUTO: 6.3 K/UL (ref 4.8–10.8)

## 2025-06-25 PROCEDURE — 87591 N.GONORRHOEAE DNA AMP PROB: CPT

## 2025-06-25 PROCEDURE — 83690 ASSAY OF LIPASE: CPT

## 2025-06-25 PROCEDURE — 36415 COLL VENOUS BLD VENIPUNCTURE: CPT

## 2025-06-25 PROCEDURE — 87491 CHLMYD TRACH DNA AMP PROBE: CPT

## 2025-06-25 PROCEDURE — 76830 TRANSVAGINAL US NON-OB: CPT

## 2025-06-25 PROCEDURE — 700111 HCHG RX REV CODE 636 W/ 250 OVERRIDE (IP): Mod: JZ | Performed by: EMERGENCY MEDICINE

## 2025-06-25 PROCEDURE — 81003 URINALYSIS AUTO W/O SCOPE: CPT

## 2025-06-25 PROCEDURE — 96372 THER/PROPH/DIAG INJ SC/IM: CPT

## 2025-06-25 PROCEDURE — 99284 EMERGENCY DEPT VISIT MOD MDM: CPT

## 2025-06-25 PROCEDURE — 84703 CHORIONIC GONADOTROPIN ASSAY: CPT

## 2025-06-25 PROCEDURE — 85025 COMPLETE CBC W/AUTO DIFF WBC: CPT

## 2025-06-25 PROCEDURE — 80053 COMPREHEN METABOLIC PANEL: CPT

## 2025-06-25 RX ORDER — CEFTRIAXONE 500 MG/1
500 INJECTION, POWDER, FOR SOLUTION INTRAMUSCULAR; INTRAVENOUS ONCE
Status: COMPLETED | OUTPATIENT
Start: 2025-06-25 | End: 2025-06-25

## 2025-06-25 RX ORDER — DOXYCYCLINE 100 MG/1
100 CAPSULE ORAL 2 TIMES DAILY
Qty: 20 CAPSULE | Refills: 0 | Status: ACTIVE | OUTPATIENT
Start: 2025-06-25 | End: 2025-07-05

## 2025-06-25 RX ADMIN — CEFTRIAXONE SODIUM 500 MG: 500 INJECTION, POWDER, FOR SOLUTION INTRAMUSCULAR; INTRAVENOUS at 17:04

## 2025-06-25 ASSESSMENT — FIBROSIS 4 INDEX: FIB4 SCORE: 0.25

## 2025-06-25 NOTE — ED PROVIDER NOTES
ED Provider Note    CHIEF COMPLAINT  Chief Complaint   Patient presents with    Pelvic Pain    Nausea       EXTERNAL RECORDS REVIEWED  Reviewed medication list baseline laboratory studies    HPI/ROS  LIMITATION TO HISTORY   None  OUTSIDE HISTORIAN(S):  None    Lisbeth Hernández is a 30 y.o. female who presents for a constellation of symptoms including abrupt onset lower abdominal pelvic pain and pressure.  She specifically denies vaginal bleeding.  No vaginal discharge.  She did report possibly having some sores or lumps on her external labia.  Patient is concerned because she had unprotected sex with a new partner 2 weeks ago.  She has been pregnant twice in the past had 1 normal spontaneous vaginal delivery and 1 elective miscarriage over a year ago.  She denies dysuria or hematuria.  No upper abdominal pain.  She has no thoracoabdominal surgical history.  No associated nausea vomiting or diarrhea.  Pain came on maximally and has subsided to a dull aching pain.  Of note the patient is on a DMARD injectable for her psoriatic arthritis    PAST MEDICAL HISTORY   has a past medical history of Cyclothymia and Iron deficiency anemia due to chronic blood loss (6/18/2020).    SURGICAL HISTORY   has a past surgical history that includes repair of nasal septum (2010); tumor excision with biopsy (2007; 2009); rhinoplasty (2009); and submandible gland excision.    FAMILY HISTORY  Family History   Problem Relation Age of Onset    Psychiatric Illness Mother         bipolar and depression    Hyperlipidemia Paternal Grandmother     Psychiatric Illness Maternal Aunt     Psychiatric Illness Maternal Uncle     Diabetes Paternal Aunt     Hypertension Paternal Aunt     Hyperlipidemia Paternal Aunt     Cancer Neg Hx     Stroke Neg Hx     Heart Disease Neg Hx        SOCIAL HISTORY  Social History     Tobacco Use    Smoking status: Former     Current packs/day: 0.00     Average packs/day: 0.1 packs/day for 2.0 years (0.2 ttl pk-yrs)  "    Types: Cigarettes     Start date: 2014     Quit date: 2016     Years since quittin.0    Smokeless tobacco: Never   Vaping Use    Vaping status: Never Used   Substance and Sexual Activity    Alcohol use: Not Currently     Comment: 2-3 drinks twice a month    Drug use: Yes     Types: Inhaled     Comment: weed    Sexual activity: Not Currently     Partners: Male     Birth control/protection: Condom, Pill     Recent unprotected sex  CURRENT MEDICATIONS  Home Medications    **Home medications have not yet been reviewed for this encounter**       Audit from Redirected Encounters    **Home medications have not yet been reviewed for this encounter**     See MAR please note the patient is on an injectable immunosuppressant    ALLERGIES  Allergies[1]    PHYSICAL EXAM  VITAL SIGNS: /85   Pulse 75   Temp 36.9 °C (98.5 °F) (Temporal)   Resp 16   Ht 1.702 m (5' 7\")   Wt 88 kg (194 lb 0.1 oz)   LMP 2025   SpO2 100%   BMI 30.39 kg/m²    Pulse ox interpretation: I interpret this pulse ox as normal.  Constitutional: Alert and oriented x 3, no acute distress  HEENT: Atraumatic normocephalic, pupils are equal round reactive to light extraocular movements are intact. The nares is clear, external ears are normal, mouth shows moist mucous membranes normal dentition for age  Neck: Supple, no JVD no tracheal deviation  Cardiovascular: Regular rate and rhythm no murmur rub or gallop 2+ pulses peripherally x4  Thorax & Lungs: No respiratory distress, no wheezes rales or rhonchi, No chest tenderness.   GI: Soft nontender nondistended positive bowel sounds, no peritoneal signs  Female nurse Stevie was in the room during exam.  External genitalia is notable for a shallow 5 mm either ulcer or some sort of lesion.  It does not appear to be vesicular it is not fluctuant there is no abscess there is no extension into the introitus.  Speculum exam reveals some white discharge with mild cervical motion tenderness " noted but no purulent discharge.  No masses noted no active bleeding  Skin: Warm dry no acute rash or lesion  Musculoskeletal: Moving all extremities with full range and 5 of 5 strength no acute  deformity  Neurologic: Cranial nerves III through XII are grossly intact no sensory deficit no cerebellar dysfunction   Psychiatric: Appropriate affect for situation at this time          EKG/LABS  Results for orders placed or performed during the hospital encounter of 06/25/25   CBC WITH DIFFERENTIAL    Collection Time: 06/25/25 11:44 AM   Result Value Ref Range    WBC 6.3 4.8 - 10.8 K/uL    RBC 5.08 4.20 - 5.40 M/uL    Hemoglobin 11.7 (L) 12.0 - 16.0 g/dL    Hematocrit 38.7 37.0 - 47.0 %    MCV 76.2 (L) 81.4 - 97.8 fL    MCH 23.0 (L) 27.0 - 33.0 pg    MCHC 30.2 (L) 32.2 - 35.5 g/dL    RDW 48.2 35.9 - 50.0 fL    Platelet Count 391 164 - 446 K/uL    MPV 10.3 9.0 - 12.9 fL    Neutrophils-Polys 55.50 44.00 - 72.00 %    Lymphocytes 35.20 22.00 - 41.00 %    Monocytes 6.60 0.00 - 13.40 %    Eosinophils 1.40 0.00 - 6.90 %    Basophils 0.80 0.00 - 1.80 %    Immature Granulocytes 0.50 0.00 - 0.90 %    Nucleated RBC 0.00 0.00 - 0.20 /100 WBC    Neutrophils (Absolute) 3.51 1.82 - 7.42 K/uL    Lymphs (Absolute) 2.23 1.00 - 4.80 K/uL    Monos (Absolute) 0.42 0.00 - 0.85 K/uL    Eos (Absolute) 0.09 0.00 - 0.51 K/uL    Baso (Absolute) 0.05 0.00 - 0.12 K/uL    Immature Granulocytes (abs) 0.03 0.00 - 0.11 K/uL    NRBC (Absolute) 0.00 K/uL   COMP METABOLIC PANEL    Collection Time: 06/25/25 11:44 AM   Result Value Ref Range    Sodium 138 135 - 145 mmol/L    Potassium 3.4 (L) 3.6 - 5.5 mmol/L    Chloride 102 96 - 112 mmol/L    Co2 23 20 - 33 mmol/L    Anion Gap 13.0 7.0 - 16.0    Glucose 92 65 - 99 mg/dL    Bun 9 8 - 22 mg/dL    Creatinine 0.64 0.50 - 1.40 mg/dL    Calcium 9.3 8.5 - 10.5 mg/dL    Correct Calcium 9.0 8.5 - 10.5 mg/dL    AST(SGOT) 37 12 - 45 U/L    ALT(SGPT) 33 2 - 50 U/L    Alkaline Phosphatase 71 30 - 99 U/L    Total  Bilirubin 0.7 0.1 - 1.5 mg/dL    Albumin 4.4 3.2 - 4.9 g/dL    Total Protein 8.0 6.0 - 8.2 g/dL    Globulin 3.6 (H) 1.9 - 3.5 g/dL    A-G Ratio 1.2 g/dL   LIPASE    Collection Time: 06/25/25 11:44 AM   Result Value Ref Range    Lipase 54 11 - 82 U/L   HCG QUAL SERUM    Collection Time: 06/25/25 11:44 AM   Result Value Ref Range    Beta-Hcg Qualitative Serum Negative Negative   URINALYSIS,CULTURE IF INDICATED    Collection Time: 06/25/25 11:44 AM    Specimen: Blood   Result Value Ref Range    Color Yellow     Character Clear     Specific Gravity 1.007 <1.035    Ph 6.0 5.0 - 8.0    Glucose Negative Negative mg/dL    Ketones Negative Negative mg/dL    Protein Negative Negative mg/dL    Bilirubin Negative Negative    Urobilinogen, Urine 0.2 <=1.0 EU/dL    Nitrite Negative Negative    Leukocyte Esterase Negative Negative    Occult Blood Negative Negative    Micro Urine Req see below    ESTIMATED GFR    Collection Time: 06/25/25 11:44 AM   Result Value Ref Range    GFR (CKD-EPI) 122 >60 mL/min/1.73 m 2   Chlamydia/GC, PCR (Genital/Anal swab)    Collection Time: 06/25/25  1:46 PM   Result Value Ref Range    Source Genital    WET PREP    Collection Time: 06/25/25  1:46 PM    Specimen: Genital   Result Value Ref Range    Significant Indicator NEG     Source GEN     Site Genital     Wet Prep For Parasites       No yeast.  No motile Trichomonas seen.  No clue cells seen.  Rare WBCs seen.       *Note: Due to a large number of results and/or encounters for the requested time period, some results have not been displayed. A complete set of results can be found in Results Review.       I have independently interpreted this EKG    RADIOLOGY/PROCEDURES   I have independently interpreted the diagnostic imaging associated with this visit and am waiting the final reading from the radiologist.   My preliminary interpretation is as follows: No acute process    Radiologist interpretation:  US-PELVIC TRANSVAGINAL ONLY   Final Result      1.   Normal transvaginal appearance of the pelvis.          COURSE & MEDICAL DECISION MAKING    ASSESSMENT, COURSE AND PLAN  Care Narrative:     This is a very pleasant 30-year-old female presents here with lower abdominal discomfort and some vaginal discharge.  She apparently is in a monogamous relationship but did have unprotected sex around 2 weeks ago.  She had extensive evaluation today.  On arrival her vital signs are reassuring.  She did not have any high fever hypotension tachycardia or hypoxia.  On physical exam she had some dull aching pain but no peritoneal signs.  Her workup here demonstrates normal white blood cell count slightly low hemoglobin and a metabolic panel that is unremarkable.  Pregnancy is negative and urinalysis is negative for blood or infection.  Subsequent transabdominal ultrasound was performed to rule out fibroids, tubo-ovarian abscess, ovarian cyst with or without torsion.  This was normal.  Pelvic exam did reveal some discharge and subsequent wet prep demonstrated no yeast no trichomonas no clue cells but there is some WBCs seen.  Because the patient is somewhat immunocompromised I felt that treating her for PID with intramuscular Rocephin and covering her with doxycycline for 10 days is clinically appropriate.  I considered but do not feel that parenteral antibiotics are indicated.  Gonorrhea and Chlamydia testing is still pending.  I counseled the patient to practice safe sex and to return as needed for new or worsening symptoms          ADDITIONAL PROBLEMS MANAGED      DISPOSITION AND DISCUSSIONS  I have discussed management of the patient with the following physicians and CLIVE's: None    Discussion of management with other QHP or appropriate source(s): None    Escalation of care considered, and ultimately not performed: Considered parenteral antibiotics    Barriers to care at this time, including but not limited to: None.     Decision tools and prescription drugs considered including,  but not limited to: Patient will be prescribed doxycycline.    FINAL DIAGNOSIS  1. Pelvic inflammatory disease  doxycycline (MONODOX) 100 MG capsule             Electronically signed by: Deepak Penn M.D., 6/25/2025 1:09 PM           [1]   Allergies  Allergen Reactions    Plaquenil [Hydroxychloroquine] Itching    Sulfa Drugs      Family history of allergy, pt has not taken

## 2025-06-25 NOTE — ED TRIAGE NOTES
Pt states went to stand up at work and got a sharp pain in her lower pelvic region. Pt states is now dizzy and nauseous. Pt states also did have unprotected sex in the last few weeks. Pt denies bleeding. States the pain is now gone but does still have pressure. Pt also c/o a bump on her labia that she would like checked out.

## 2025-06-26 NOTE — ED NOTES
Medicated per order.      Discharge instructions provided.  Pt verbalized the understanding of discharge instructions to follow up with PCP and to return to ER if condition worsens.  Pt ambulated out of ER without difficulty.

## 2025-06-29 DIAGNOSIS — Z78.9 USES BIRTH CONTROL: Primary | ICD-10-CM

## 2025-07-01 RX ORDER — DESOGESTREL AND ETHINYL ESTRADIOL 0.15-0.03
1 KIT ORAL
Qty: 28 TABLET | Refills: 11 | Status: SHIPPED | OUTPATIENT
Start: 2025-07-01

## 2025-07-03 ENCOUNTER — HOSPITAL ENCOUNTER (OUTPATIENT)
Dept: LAB | Facility: MEDICAL CENTER | Age: 30
End: 2025-07-03
Attending: INTERNAL MEDICINE
Payer: MEDICAID

## 2025-07-03 ENCOUNTER — APPOINTMENT (OUTPATIENT)
Dept: MEDICAL GROUP | Facility: MEDICAL CENTER | Age: 30
End: 2025-07-03
Payer: MEDICAID

## 2025-07-03 VITALS
OXYGEN SATURATION: 97 % | HEART RATE: 68 BPM | DIASTOLIC BLOOD PRESSURE: 60 MMHG | SYSTOLIC BLOOD PRESSURE: 104 MMHG | HEIGHT: 67 IN | WEIGHT: 192.9 LBS | RESPIRATION RATE: 16 BRPM | TEMPERATURE: 97.3 F | BODY MASS INDEX: 30.28 KG/M2

## 2025-07-03 DIAGNOSIS — M05.79 RHEUMATOID ARTHRITIS INVOLVING MULTIPLE SITES WITH POSITIVE RHEUMATOID FACTOR (HCC): ICD-10-CM

## 2025-07-03 DIAGNOSIS — F33.1 MODERATE EPISODE OF RECURRENT MAJOR DEPRESSIVE DISORDER (HCC): ICD-10-CM

## 2025-07-03 DIAGNOSIS — E61.1 IRON DEFICIENCY: ICD-10-CM

## 2025-07-03 DIAGNOSIS — R12 HEARTBURN: Primary | ICD-10-CM

## 2025-07-03 DIAGNOSIS — R23.3 EASY BRUISING: ICD-10-CM

## 2025-07-03 PROBLEM — K13.79 MOUTH SORES: Status: RESOLVED | Noted: 2024-10-17 | Resolved: 2025-07-03

## 2025-07-03 PROBLEM — N89.8 VAGINAL IRRITATION: Status: RESOLVED | Noted: 2024-08-28 | Resolved: 2025-07-03

## 2025-07-03 LAB
ERYTHROCYTE [DISTWIDTH] IN BLOOD BY AUTOMATED COUNT: 49.9 FL (ref 35.9–50)
FERRITIN SERPL-MCNC: 15.3 NG/ML (ref 10–291)
HCT VFR BLD AUTO: 37.9 % (ref 37–47)
HGB BLD-MCNC: 11.2 G/DL (ref 12–16)
INR PPP: 1.12 (ref 0.87–1.13)
MCH RBC QN AUTO: 22.8 PG (ref 27–33)
MCHC RBC AUTO-ENTMCNC: 29.6 G/DL (ref 32.2–35.5)
MCV RBC AUTO: 77 FL (ref 81.4–97.8)
PLATELET # BLD AUTO: 360 K/UL (ref 164–446)
PMV BLD AUTO: 11.5 FL (ref 9–12.9)
PROTHROMBIN TIME: 14.5 SEC (ref 12–14.6)
RBC # BLD AUTO: 4.92 M/UL (ref 4.2–5.4)
WBC # BLD AUTO: 7.3 K/UL (ref 4.8–10.8)

## 2025-07-03 PROCEDURE — 36415 COLL VENOUS BLD VENIPUNCTURE: CPT

## 2025-07-03 PROCEDURE — 99214 OFFICE O/P EST MOD 30 MIN: CPT | Performed by: INTERNAL MEDICINE

## 2025-07-03 PROCEDURE — 85610 PROTHROMBIN TIME: CPT

## 2025-07-03 PROCEDURE — 82728 ASSAY OF FERRITIN: CPT

## 2025-07-03 PROCEDURE — 99213 OFFICE O/P EST LOW 20 MIN: CPT | Performed by: INTERNAL MEDICINE

## 2025-07-03 PROCEDURE — 3078F DIAST BP <80 MM HG: CPT | Performed by: INTERNAL MEDICINE

## 2025-07-03 PROCEDURE — 3074F SYST BP LT 130 MM HG: CPT | Performed by: INTERNAL MEDICINE

## 2025-07-03 PROCEDURE — 85027 COMPLETE CBC AUTOMATED: CPT

## 2025-07-03 RX ORDER — NIZATIDINE 150 MG/1
150 CAPSULE ORAL
Qty: 30 CAPSULE | Refills: 3 | Status: SHIPPED | OUTPATIENT
Start: 2025-07-03

## 2025-07-03 RX ORDER — ESCITALOPRAM OXALATE 10 MG/1
10 TABLET ORAL DAILY
Qty: 30 TABLET | Refills: 5 | Status: SHIPPED | OUTPATIENT
Start: 2025-07-03

## 2025-07-03 RX ORDER — PANTOPRAZOLE SODIUM 40 MG/1
40 TABLET, DELAYED RELEASE ORAL DAILY
Qty: 30 TABLET | Refills: 0 | Status: SHIPPED | OUTPATIENT
Start: 2025-07-03 | End: 2025-07-30

## 2025-07-03 ASSESSMENT — FIBROSIS 4 INDEX: FIB4 SCORE: 0.49

## 2025-07-03 NOTE — ASSESSMENT & PLAN NOTE
She reports that for several weeks she has had a sensation of discomfort when swallowing.  She feels like something specifically popping or catching on the right side of her throat.  Does not occur every time she swallows and can happen with both liquids and solids.  She reports a change in her voice with more hoarseness.  She reports acid reflux regularly.  She had taken pantoprazole in the past but stopped after her symptoms improved and she ran out of her prescription.  Of note, she has recently been on steroids for her RA.  She denies nausea and vomiting, melena, hematochezia.  She is symptomatic with heartburn at least 3 to 4 days/week.

## 2025-07-03 NOTE — ASSESSMENT & PLAN NOTE
She continues to struggle with both the pain and the emotional burden of her diagnosis.  She is taking symptoms again and has been on it for about 2 months.  She has a follow-up appointment with her rheumatologist in November but she feels like she is still in the midst of a flare.  She has been taking prednisone consistently.

## 2025-07-03 NOTE — ASSESSMENT & PLAN NOTE
Has not been taking her iron supplement regularly for a year.  Last labs for ferritin were checked in 2023 and she was low at 9.7.  She reports heavy menses for about the first 2 to 3 days and then they get lighter for the next 2 to 3 days.  They are regular each month.  Last CBC showed mild anemia with hemoglobin of 11.7.  She reports a lot of fatigue.

## 2025-07-03 NOTE — ASSESSMENT & PLAN NOTE
She reports worsening depression lately especially related to her difficulty with controlling her RA.  It is affecting her motivation.  She is easily tearful.  She reports that initially when she started Lexapro and was taking it regularly in addition to attending regular counseling that she was doing better.  However about 3 months ago her counselor stopped taking her insurance and she started missing doses of her Lexapro.  She recently stopped taking it altogether and feels like things have significantly worsened.  Thankfully she was able to get a therapy appointment on 7/28 and is looking forward to this.  Denies suicidal ideation.  Is interested in restarting Lexapro.

## 2025-07-03 NOTE — ASSESSMENT & PLAN NOTE
States she has noticed easy bruising and bleeding lately.  She will get a small cut and it will be difficult for her to stop the bleeding.  She denies any nosebleeds or gum bleeding.  Recently had a CBC on 6/5 and all cell counts were normal except for mild anemia with hemoglobin of 11.7.

## 2025-07-03 NOTE — PROGRESS NOTES
Subjective:   Lisbeth Hernández is a 30 y.o. female here today for multiple concerns    Heartburn  She reports that for several weeks she has had a sensation of discomfort when swallowing.  She feels like something specifically popping or catching on the right side of her throat.  Does not occur every time she swallows and can happen with both liquids and solids.  She reports a change in her voice with more hoarseness.  She reports acid reflux regularly.  She had taken pantoprazole in the past but stopped after her symptoms improved and she ran out of her prescription.  Of note, she has recently been on steroids for her RA.  She denies nausea and vomiting, melena, hematochezia.  She is symptomatic with heartburn at least 3 to 4 days/week.      Iron deficiency  Has not been taking her iron supplement regularly for a year.  Last labs for ferritin were checked in 2023 and she was low at 9.7.  She reports heavy menses for about the first 2 to 3 days and then they get lighter for the next 2 to 3 days.  They are regular each month.  Last CBC showed mild anemia with hemoglobin of 11.7.  She reports a lot of fatigue.      Rheumatoid arthritis involving multiple sites with positive rheumatoid factor (HCC)  She continues to struggle with both the pain and the emotional burden of her diagnosis.  She is taking symptoms again and has been on it for about 2 months.  She has a follow-up appointment with her rheumatologist in November but she feels like she is still in the midst of a flare.  She has been taking prednisone consistently.    Moderate episode of recurrent major depressive disorder (HCC)  She reports worsening depression lately especially related to her difficulty with controlling her RA.  It is affecting her motivation.  She is easily tearful.  She reports that initially when she started Lexapro and was taking it regularly in addition to attending regular counseling that she was doing better.  However about 3 months  ago her counselor stopped taking her insurance and she started missing doses of her Lexapro.  She recently stopped taking it altogether and feels like things have significantly worsened.  Thankfully she was able to get a therapy appointment on 7/28 and is looking forward to this.  Denies suicidal ideation.  Is interested in restarting Lexapro.    Easy bruising  States she has noticed easy bruising and bleeding lately.  She will get a small cut and it will be difficult for her to stop the bleeding.  She denies any nosebleeds or gum bleeding.  Recently had a CBC on 6/5 and all cell counts were normal except for mild anemia with hemoglobin of 11.7.       Current medicines (including changes today)  Current Medications[1]  She  has a past medical history of Cyclothymia and Iron deficiency anemia due to chronic blood loss (6/18/2020).         Objective:     Vitals:    07/03/25 0915   BP: 104/60   Pulse: 68   Resp: 16   Temp: 36.3 °C (97.3 °F)   SpO2: 97%     Body mass index is 30.21 kg/m².   Physical Exam:  Constitutional: Alert, no distress.  Skin: Warm, dry, good turgor, no rashes in visible areas.  Eye: Equal, round and reactive, conjunctiva clear, lids normal.  Neck: Trachea midline, no masses, no thyromegaly. No cervical or supraclavicular lymphadenopathy  Psych: Alert and oriented x3, tearful during interview      Results and Imaging:   Admission on 06/25/2025, Discharged on 06/25/2025   Component Date Value Ref Range Status    WBC 06/25/2025 6.3  4.8 - 10.8 K/uL Final    RBC 06/25/2025 5.08  4.20 - 5.40 M/uL Final    Hemoglobin 06/25/2025 11.7 (L)  12.0 - 16.0 g/dL Final    Hematocrit 06/25/2025 38.7  37.0 - 47.0 % Final    MCV 06/25/2025 76.2 (L)  81.4 - 97.8 fL Final    MCH 06/25/2025 23.0 (L)  27.0 - 33.0 pg Final    MCHC 06/25/2025 30.2 (L)  32.2 - 35.5 g/dL Final    RDW 06/25/2025 48.2  35.9 - 50.0 fL Final    Platelet Count 06/25/2025 391  164 - 446 K/uL Final    MPV 06/25/2025 10.3  9.0 - 12.9 fL Final     Neutrophils-Polys 06/25/2025 55.50  44.00 - 72.00 % Final    Lymphocytes 06/25/2025 35.20  22.00 - 41.00 % Final    Monocytes 06/25/2025 6.60  0.00 - 13.40 % Final    Eosinophils 06/25/2025 1.40  0.00 - 6.90 % Final    Basophils 06/25/2025 0.80  0.00 - 1.80 % Final    Immature Granulocytes 06/25/2025 0.50  0.00 - 0.90 % Final    Nucleated RBC 06/25/2025 0.00  0.00 - 0.20 /100 WBC Final    Neutrophils (Absolute) 06/25/2025 3.51  1.82 - 7.42 K/uL Final    Includes immature neutrophils, if present.    Lymphs (Absolute) 06/25/2025 2.23  1.00 - 4.80 K/uL Final    Monos (Absolute) 06/25/2025 0.42  0.00 - 0.85 K/uL Final    Eos (Absolute) 06/25/2025 0.09  0.00 - 0.51 K/uL Final    Baso (Absolute) 06/25/2025 0.05  0.00 - 0.12 K/uL Final    Immature Granulocytes (abs) 06/25/2025 0.03  0.00 - 0.11 K/uL Final    NRBC (Absolute) 06/25/2025 0.00  K/uL Final    Sodium 06/25/2025 138  135 - 145 mmol/L Final    Potassium 06/25/2025 3.4 (L)  3.6 - 5.5 mmol/L Final    Chloride 06/25/2025 102  96 - 112 mmol/L Final    Co2 06/25/2025 23  20 - 33 mmol/L Final    Anion Gap 06/25/2025 13.0  7.0 - 16.0 Final    Glucose 06/25/2025 92  65 - 99 mg/dL Final    Bun 06/25/2025 9  8 - 22 mg/dL Final    Creatinine 06/25/2025 0.64  0.50 - 1.40 mg/dL Final    Calcium 06/25/2025 9.3  8.5 - 10.5 mg/dL Final    Correct Calcium 06/25/2025 9.0  8.5 - 10.5 mg/dL Final    AST(SGOT) 06/25/2025 37  12 - 45 U/L Final    ALT(SGPT) 06/25/2025 33  2 - 50 U/L Final    Alkaline Phosphatase 06/25/2025 71  30 - 99 U/L Final    Total Bilirubin 06/25/2025 0.7  0.1 - 1.5 mg/dL Final    Albumin 06/25/2025 4.4  3.2 - 4.9 g/dL Final    Total Protein 06/25/2025 8.0  6.0 - 8.2 g/dL Final    Globulin 06/25/2025 3.6 (H)  1.9 - 3.5 g/dL Final    A-G Ratio 06/25/2025 1.2  g/dL Final    Lipase 06/25/2025 54  11 - 82 U/L Final    Beta-Hcg Qualitative Serum 06/25/2025 Negative  Negative Final    Color 06/25/2025 Yellow   Final    Character 06/25/2025 Clear   Final    Specific  Gravity 06/25/2025 1.007  <1.035 Final    Ph 06/25/2025 6.0  5.0 - 8.0 Final    Glucose 06/25/2025 Negative  Negative mg/dL Final    Ketones 06/25/2025 Negative  Negative mg/dL Final    Protein 06/25/2025 Negative  Negative mg/dL Final    Bilirubin 06/25/2025 Negative  Negative Final    Urobilinogen, Urine 06/25/2025 0.2  <=1.0 EU/dL Final    Nitrite 06/25/2025 Negative  Negative Final    Leukocyte Esterase 06/25/2025 Negative  Negative Final    Occult Blood 06/25/2025 Negative  Negative Final    Micro Urine Req 06/25/2025 see below   Final    Comment: Microscopic examination not performed when specimen is clear  and chemically negative for protein, blood, leukocyte esterase  and nitrite.      GFR (CKD-EPI) 06/25/2025 122  >60 mL/min/1.73 m 2 Final    Comment: Estimated Glomerular Filtration Rate is calculated using  race neutral CKD-EPI 2021 equation per NK[facility]-ASN recommendations.      C. trachomatis by PCR 06/25/2025 Negative  Negative Final    N. gonorrhoeae by PCR 06/25/2025 Negative  Negative Final    Source 06/25/2025 Genital   Final    Significant Indicator 06/25/2025 NEG   Final    Source 06/25/2025 GEN   Final    Site 06/25/2025 Genital   Final    Wet Prep For Parasites 06/25/2025    Final                    Value:No yeast.  No motile Trichomonas seen.  No clue cells seen.  Rare WBCs seen.           Assessment and Plan:   The following treatment plan was discussed    1. Heartburn (Primary)  Uncontrolled, unclear if this is related to the sensation she is feeling in her throat when she swallows.  We discussed that while taking prednisone, it is much more common to develop acid reflux and gastritis.  therefore we discussed treating for 1 month with pantoprazole with an H2 blocker for breakthrough symptoms.  We will follow-up after that.  If symptoms are unchanged or worsening then would consider a swallow study.  - nizatidine (AXID) 150 MG capsule; Take 1 Capsule by mouth 1 time a day as needed for Heartburn.   Dispense: 30 Capsule; Refill: 3  - pantoprazole (PROTONIX) 40 MG Tablet Delayed Response; Take 1 Tablet by mouth every day.  Dispense: 30 Tablet; Refill: 0    2. Moderate episode of recurrent major depressive disorder (HCC)  Uncontrolled.  She would like to restart her Lexapro, discussed importance of regular use for maximum benefit.  She will restart therapy at the end of this month  -Continue with her therapist  - escitalopram (LEXAPRO) 10 MG Tab; Take 1 Tablet by mouth every day.  Dispense: 30 Tablet; Refill: 5    3. Iron deficiency  Off of supplementation, will update labs to further inform decision making regarding iron replacement  - FERRITIN; Future  - CBC WITHOUT DIFFERENTIAL; Future    4. Easy bruising  Do not suspect a coagulopathy however will obtain labs as below  - Prothrombin Time; Future    5. Rheumatoid arthritis involving multiple sites with positive rheumatoid factor (HCC)  Has been difficult to control.  She will continue to communicate with her rheumatologist, Dania bi monthly        Followup: Return in about 4 weeks (around 7/31/2025) for GERD, depression.                [1]   Current Outpatient Medications   Medication Sig Dispense Refill    nizatidine (AXID) 150 MG capsule Take 1 Capsule by mouth 1 time a day as needed for Heartburn. 30 Capsule 3    pantoprazole (PROTONIX) 40 MG Tablet Delayed Response Take 1 Tablet by mouth every day. 30 Tablet 0    escitalopram (LEXAPRO) 10 MG Tab Take 1 Tablet by mouth every day. 30 Tablet 5    ENSKYCE 0.15-30 MG-MCG per tablet TAKE 1 TABLET BY MOUTH EVERY DAY 28 Tablet 11    doxycycline (MONODOX) 100 MG capsule Take 1 Capsule by mouth 2 times a day for 10 days. 20 Capsule 0    certolizumab pegol (CIMZIA, 2 SYRINGE,) 200 MG/ML Prefilled Syringe Kit Inject 200 mg under the skin every 14 days. 2 mL 5    predniSONE (DELTASONE) 10 MG Tab Take 3 tablets every day for 7 days, then decrease by 0.5 tablet every 7 days until finished. Take in the morning with food.  74 Tablet 0     No current facility-administered medications for this visit.

## 2025-07-11 ENCOUNTER — OFFICE VISIT (OUTPATIENT)
Dept: URGENT CARE | Facility: CLINIC | Age: 30
End: 2025-07-11
Payer: MEDICAID

## 2025-07-11 ENCOUNTER — HOSPITAL ENCOUNTER (OUTPATIENT)
Facility: MEDICAL CENTER | Age: 30
End: 2025-07-11
Attending: NURSE PRACTITIONER
Payer: MEDICAID

## 2025-07-11 VITALS
TEMPERATURE: 97.8 F | DIASTOLIC BLOOD PRESSURE: 74 MMHG | OXYGEN SATURATION: 100 % | HEIGHT: 67 IN | HEART RATE: 73 BPM | SYSTOLIC BLOOD PRESSURE: 125 MMHG | RESPIRATION RATE: 19 BRPM | BODY MASS INDEX: 30.67 KG/M2 | WEIGHT: 195.4 LBS

## 2025-07-11 DIAGNOSIS — L02.416 ABSCESS OF LEFT THIGH: Primary | ICD-10-CM

## 2025-07-11 DIAGNOSIS — L02.416 ABSCESS OF LEFT THIGH: ICD-10-CM

## 2025-07-11 DIAGNOSIS — L03.116 CELLULITIS OF LEFT THIGH: ICD-10-CM

## 2025-07-11 PROCEDURE — 87077 CULTURE AEROBIC IDENTIFY: CPT

## 2025-07-11 PROCEDURE — 10060 I&D ABSCESS SIMPLE/SINGLE: CPT | Performed by: NURSE PRACTITIONER

## 2025-07-11 PROCEDURE — 87205 SMEAR GRAM STAIN: CPT

## 2025-07-11 PROCEDURE — 3078F DIAST BP <80 MM HG: CPT | Performed by: NURSE PRACTITIONER

## 2025-07-11 PROCEDURE — 3074F SYST BP LT 130 MM HG: CPT | Performed by: NURSE PRACTITIONER

## 2025-07-11 PROCEDURE — 87070 CULTURE OTHR SPECIMN AEROBIC: CPT

## 2025-07-11 PROCEDURE — RXMED WILLOW AMBULATORY MEDICATION CHARGE: Performed by: NURSE PRACTITIONER

## 2025-07-11 PROCEDURE — 87186 SC STD MICRODIL/AGAR DIL: CPT

## 2025-07-11 RX ORDER — DOXYCYCLINE HYCLATE 100 MG
100 TABLET ORAL 2 TIMES DAILY
Qty: 14 TABLET | Refills: 0 | Status: SHIPPED | OUTPATIENT
Start: 2025-07-11 | End: 2025-07-18

## 2025-07-11 ASSESSMENT — VISUAL ACUITY: OU: 1

## 2025-07-11 ASSESSMENT — FIBROSIS 4 INDEX: FIB4 SCORE: 0.54

## 2025-07-11 ASSESSMENT — ENCOUNTER SYMPTOMS: FEVER: 0

## 2025-07-12 LAB
GRAM STN SPEC: NORMAL
SIGNIFICANT IND 70042: NORMAL
SITE SITE: NORMAL
SOURCE SOURCE: NORMAL

## 2025-07-12 NOTE — PROGRESS NOTES
"Subjective:     Lisbeth Hernández is a 30 y.o. female who presents for Cyst (LEFT LEG)       Cyst  This is a new problem. Pertinent negatives include no fever.     Ambient listening software ([employer]) used during this encounter with the consent of the patient. The following text is AI-assisted:    History of Present Illness  The patient presents for evaluation of a painful, brown, warm, and hard lesion on her left posterior thigh. The lesion developed on Monday as a small pimple-like lesion, manipulated by her partner without discharge. Hydrocortisone has been applied, but pain persists, making it difficult to sit. Tender to touch, no fevers or chills. Photographs show enlargement. History of rheumatoid arthritis on immunosuppressants.    Review of Systems   Constitutional:  Negative for fever.   All other systems reviewed and are negative.  Refer to HPI for additional details.    During this visit, appropriate PPE was worn, and hand hygiene was performed.    PMH:  has a past medical history of Cyclothymia and Iron deficiency anemia due to chronic blood loss (6/18/2020).    MEDS: Current Medications[1]    ALLERGIES: Allergies[2]  SURGHX: Past Surgical History[3]  SOCHX:  reports that she quit smoking about 9 years ago. Her smoking use included cigarettes. She started smoking about 11 years ago. She has a 0.2 pack-year smoking history. She has never used smokeless tobacco. She reports that she does not currently use alcohol. She reports current drug use. Drug: Inhaled.    FH: Per HPI as applicable/pertinent.      Objective:     /74   Pulse 73   Temp 36.6 °C (97.8 °F) (Temporal)   Resp 19   Ht 1.702 m (5' 7\")   Wt 88.6 kg (195 lb 6.4 oz)   LMP 06/04/2025   SpO2 100%   BMI 30.60 kg/m²     Physical Exam  Nursing note reviewed.   Constitutional:       General: She is not in acute distress.     Appearance: She is well-developed. She is not ill-appearing or toxic-appearing.   Eyes:      General: Vision " grossly intact.   Cardiovascular:      Rate and Rhythm: Normal rate.   Pulmonary:      Effort: Pulmonary effort is normal. No respiratory distress.   Musculoskeletal:         General: No deformity. Normal range of motion.        Legs:       Comments: 2 x 2 cm area of erythema, induration, at posterior left thigh, possible small area of fluctuance   Skin:     General: Skin is warm and dry.      Coloration: Skin is not pale.      Findings: Erythema present.   Neurological:      Mental Status: She is alert and oriented to person, place, and time.      Motor: No weakness.   Psychiatric:         Behavior: Behavior normal. Behavior is cooperative.       Assessment/Plan:     1. Cellulitis of left thigh  - doxycycline (VIBRAMYCIN) 100 MG Tab; Take 1 Tablet by mouth 2 times a day for 7 days.  Dispense: 14 Tablet; Refill: 0    2. Abscess of left thigh  - CULTURE WOUND W/ GRAM STAIN; Future    Procedure: Incision and Drainage of Left Thigh Abscess  - Risks, benefits, and alternatives reviewed.  - Verbal consent received from patient to proceed with incision and drainage.  - Site prepared with Betadine.  - Clean technique with sterile instruments.  - Local anesthesia achieved with 4 mL of 1% lidocaine without epinephrine.  - Small incision with 18 g needle made into possible small fluctuant area with small purulent material expressed.  - Culture obtained and packaged for lab.  - Irrigated copiously with NS.  - Minimal bleeding with good hemostasis achieved.  - Non-adhesive dressing applied.  - There were no procedural complications.  - Patient tolerated procedure well.    Advised patient wound care.  Tylenol as needed for pain.  Cool compress PRN.  Monitor for signs/symptoms of worsening infection.    Rx as above sent electronically for doxycycline.    Monitor. Follow up in about 3 days if symptoms do not improve or sooner if symptoms change or worsen. Warning signs reviewed. Immediate return precautions advised.     Discharge  summary provided via [email or web address].    Differential diagnosis, natural history, supportive care, over-the-counter symptom management per 's instructions, close monitoring, and indications for immediate follow-up discussed.     All questions answered. Patient agrees with the plan of care.         [1]   Current Outpatient Medications:     doxycycline (VIBRAMYCIN) 100 MG Tab, Take 1 Tablet by mouth 2 times a day for 7 days., Disp: 14 Tablet, Rfl: 0    nizatidine (AXID) 150 MG capsule, Take 1 Capsule by mouth 1 time a day as needed for Heartburn., Disp: 30 Capsule, Rfl: 3    pantoprazole (PROTONIX) 40 MG Tablet Delayed Response, Take 1 Tablet by mouth every day., Disp: 30 Tablet, Rfl: 0    escitalopram (LEXAPRO) 10 MG Tab, Take 1 Tablet by mouth every day., Disp: 30 Tablet, Rfl: 5    ENSKYCE 0.15-30 MG-MCG per tablet, TAKE 1 TABLET BY MOUTH EVERY DAY, Disp: 28 Tablet, Rfl: 11    certolizumab pegol (CIMZIA, 2 SYRINGE,) 200 MG/ML Prefilled Syringe Kit, Inject 200 mg under the skin every 14 days., Disp: 2 mL, Rfl: 5    predniSONE (DELTASONE) 10 MG Tab, Take 3 tablets every day for 7 days, then decrease by 0.5 tablet every 7 days until finished. Take in the morning with food., Disp: 74 Tablet, Rfl: 0  [2]   Allergies  Allergen Reactions    Plaquenil [Hydroxychloroquine] Itching    Sulfa Drugs      Family history of allergy, pt has not taken   [3]   Past Surgical History:  Procedure Laterality Date    NC REPAIR OF NASAL SEPTUM  2010    RHINOPLASTY  2009    SUBMANDIBLE GLAND EXCISION      TUMOR EXCISION WITH BIOPSY  2007; 2009    benign tumors under tongue X3 surgeries

## 2025-07-14 LAB
BACTERIA WND AEROBE CULT: ABNORMAL
BACTERIA WND AEROBE CULT: ABNORMAL
GRAM STN SPEC: ABNORMAL
SIGNIFICANT IND 70042: ABNORMAL
SITE SITE: ABNORMAL
SOURCE SOURCE: ABNORMAL

## 2025-07-15 ENCOUNTER — RESULTS FOLLOW-UP (OUTPATIENT)
Dept: MEDICAL GROUP | Facility: MEDICAL CENTER | Age: 30
End: 2025-07-15
Payer: MEDICAID

## 2025-07-15 DIAGNOSIS — E61.1 IRON DEFICIENCY: Primary | ICD-10-CM

## 2025-07-16 ENCOUNTER — APPOINTMENT (OUTPATIENT)
Dept: RHEUMATOLOGY | Facility: MEDICAL CENTER | Age: 30
End: 2025-07-16
Attending: STUDENT IN AN ORGANIZED HEALTH CARE EDUCATION/TRAINING PROGRAM
Payer: MEDICAID

## 2025-07-16 RX ORDER — FERROUS SULFATE 325(65) MG
325 TABLET ORAL DAILY
Qty: 30 TABLET | Refills: 5 | Status: SHIPPED | OUTPATIENT
Start: 2025-07-16

## 2025-07-16 NOTE — PROGRESS NOTES
[facility] RHEUMATOLOGY  [address], [address], [place] [address]  Phone: [phone] ? Fax: [phone]  [facility]/Health-Services/Rheumatology    FOLLOW-UP VISIT NOTE      DATE OF SERVICE: 07/16/2025         Subjective     PRIMARY CARE PRACTITIONER:  Eugenia Agudelo M.D.  [address]  [place] [address]    PATIENT IDENTIFICATION:  Lisbeth Hernández  [address] [id number]    YOB: 1995    MEDICAL RECORD NUMBER: [id number]          CHIEF COMPLAINT:   No chief complaint on file.      RHEUMATOLOGIC HISTORY:  Lisbeth Hernández is a 30 y.o. female with pertinent history notable for iron deficiency anemia, L CTS, prediabetes and depression, who presents for follow up.    Initial consult 5/2025:  Patient was referred to [facility] Rheumatology in 8/2023 for evaluation of rheumatoid arthritis given positive RF of 100 in the context of 6-month history of joint pain that started first on the plantar aspect of the MTP joints.  Prior to her evaluation, was sent to a podiatrist who did steroid injections into the feet however, joint pains progressed few months later to the knees (posterior aspect), shoulders (L >R), fingers (L 2nd PIP joint), and left ankle.  She tried meloxicam but it was ineffective.  Officially diagnosed with rheumatoid arthritis in 2023 and started on DMARD therapy. Last seen by previous rheumatologist Dr. Penn in 1/2025 at which time, was continued on Amjevita only and methotrexate was discontinued as she was considering pregnancy.  Recently have noticed flares of RA in several joints.  In 3/2025, developed joint pain and swelling at the L 4th PIP joint without any preceding trauma.  Reports a 1 month history of joint pain along the R lateral wrist.  3 weeks ago, developed pain in the shoulders and feet (worse with weightbearing).  She had an MRI of the left wrist in 2/2025 done due to persistent pain and swelling which showed severe tendinopathy and tenosynovitis  (no bony erosions). L wrist symptoms have improved.  Continues to have swelling in the left hand (2nd MCP/PIP and 4th PIP joint++).  Reports 1 hour of morning stiffness in the hands.  Denies Raynaud's, photosensitive/malar rash, stroke, DVT/PE, multiple miscarriages (has an 8-year-old son), sicca symptoms, mucosal ulcerations, nonscarring alopecia, psoriasis, pleuritic chest pains or dyspnea on exertion.  Denies history of persistent lymphadenopathy (notes occasional swelling of the lymph nodes around the ears and neck but these have never been swollen permanently).  Denies history of RA related pleural/pericardial effusions, ILD, episodes of red painful photophobic eyes concerning for inflammatory eye disease, or cutaneous vasculitis.  No history of demyelinating disease, lymphoma/leukemia, melanoma, other skin cancers, heart failure, or diverticulitis.  She is on oral contraceptives at this time but has future plans for pregnancy (plans to remove OCP after the summer).  Former smoker, rarely drinks alcohol.  Reports a family history of RA in maternal grandmother.     Reports other aspects of symptoms and medical history as noted on the questionnaire below or scanned under media tab.     Pertinent treatments:  Hydroxychloroquine 400 mg daily: self d/travis due to N/V/D after 2 weeks of taking the medication, but also had pruritus in the hands/feet, fluid retention in feet and hands  Methotrexate 10 mg po weekly 12/2024: d/travis due to GI upset and hair thinning   Certolizumab: effective, but DC'd due to elevated LFTs  Abatacept SQ: lost efficacy after 3-6  Etanercept: lost efficacy  Golimumab: not covered by insurance  Tocilizumab: insurance denied  Adalimumab (Amjevita) 40 mg SQ every 2 weeks: 2/2024- present  Pertinent positive labs: 6/2024, anti-F-actin (37); 12/2023, anti-F-actin (26); 5/2023, RF (100)  Pertinent negative labs: 6/2024, LUDY; 12/2023, anti-AMA; 8/2023, hep B/C/QFN gold, anti-SSA/SSB, G6PD; 5/2023,  anti-CCP, LUDY  Pertinent imagin/2025 left wrist MRI: Severe tenosynovitis and tendinopathy of the first dorsal extensor compartment with probable interstitial tear of the extensor pollicis brevis and abductor pollicis longus tendons.  Tenosynovitis throughout the remainder of the extensor tendons along with mild tenosynovitis of the flexor carpal radialis tendon.  Distal radial ulnar and radiocarpal joint effusions without acute bony abnormalities.  No discrete bony erosions.  10/2024 right hand XR: No fractures or significant malalignment  2023 RUQ US: Hepatomegaly with evidence of hepatic steatosis  2023 bilateral hand XR: No evidence of fracture, arthropathy, or erosions    INTERVAL HISTORY:  Reports interval history as noted on the questionnaire below or scanned under media tab.  No questionnaires on file.    No history of demyelinating disease, lymphoma/leukemia, or personal history of melanoma, other skin cancers, heart failure or diverticulitis ***.     Imaging and lab update***    REVIEW OF SYSTEMS:  Except as noted in the history above, relevant review of systems with emphasis on autoimmune rheumatic diseases was otherwise negative.      ACTIVE PROBLEM LIST:  Patient Active Problem List    Diagnosis Date Noted    Heartburn 2025    Easy bruising 2025    Moderate episode of recurrent major depressive disorder (HCC) 10/24/2024    Rash 10/17/2024    Prediabetes 2024    Orthopnea 10/26/2023    Excessive daytime sleepiness 10/26/2023    Rheumatoid arthritis involving multiple sites with positive rheumatoid factor (HCC) 2023    Obesity (BMI 30-39.9) 2021    History of abnormal cervical Pap smear 2020    Iron deficiency 2020       PAST MEDICAL HISTORY:  Past Medical History[1]    PAST SURGICAL HISTORY:  Past Surgical History[2]    MEDICATIONS:  Current Outpatient Medications   Medication Sig    doxycycline (VIBRAMYCIN) 100 MG Tab Take 1 Tablet by mouth 2 times a  day for 7 days.    nizatidine (AXID) 150 MG capsule Take 1 Capsule by mouth 1 time a day as needed for Heartburn.    pantoprazole (PROTONIX) 40 MG Tablet Delayed Response Take 1 Tablet by mouth every day.    escitalopram (LEXAPRO) 10 MG Tab Take 1 Tablet by mouth every day.    ENSKYCE 0.15-30 MG-MCG per tablet TAKE 1 TABLET BY MOUTH EVERY DAY    certolizumab pegol (CIMZIA, 2 SYRINGE,) 200 MG/ML Prefilled Syringe Kit Inject 200 mg under the skin every 14 days.    predniSONE (DELTASONE) 10 MG Tab Take 3 tablets every day for 7 days, then decrease by 0.5 tablet every 7 days until finished. Take in the morning with food.       ALLERGIES:   Allergies[3]    IMMUNIZATIONS:   Immunization History   Administered Date(s) Administered    9VHPV VACCINE 2-3 DOSE IM (GARDASIL 9) 02/03/2020    Dtap Vaccine 11/09/2012    HPV Quadrivalent Vaccine (GARDASIL) - HISTORICAL DATA 08/19/2013, 11/15/2013, 03/26/2014    Hepatitis A Vaccine, Ped/Adol 08/19/2013, 03/26/2014    Hepatitis B Vaccine Adolescent/Pediatric 12/07/2010, 02/17/2011, 11/09/2012, 03/22/2013    IPV 11/09/2012, 03/22/2013, 11/15/2013    Influenza Seasonal Injectable - Historical Data 12/18/2014    Influenza Vaccine Quad Inj (Pf) 10/29/2019    Influenza Vaccine Quad Nasal 11/15/2013    MMR Vaccine 11/09/2012, 03/22/2013, 08/19/2013    Meningococcal Conjugate Vaccine MCV4 (MENVEO) 03/22/2013, 11/15/2013    PFIZER ROBLES CAP SARS-COV-2 VACCINATION (12+) 08/05/2022    PFIZER PURPLE CAP SARS-COV-2 VACCINATION (12+) 01/29/2021, 02/19/2021    TD Vaccine 11/09/2012    Tdap Vaccine 03/22/2013, 08/19/2013, 12/28/2016    Tuberculin Skin Test 07/31/2018, 08/07/2020, 08/10/2020, 05/26/2022    Varicella Vaccine Live 11/09/2012, 03/22/2013, 08/19/2013, 02/03/2020       SOCIAL HISTORY:   Social History[4]    FAMILY HISTORY:  Family History   Problem Relation Age of Onset    Psychiatric Illness Mother         bipolar and depression    Hyperlipidemia Paternal Grandmother     Psychiatric  Illness Maternal Aunt     Psychiatric Illness Maternal Uncle     Diabetes Paternal Aunt     Hypertension Paternal Aunt     Hyperlipidemia Paternal Aunt     Cancer Neg Hx     Stroke Neg Hx     Heart Disease Neg Hx             Objective     Vital Signs: There were no vitals taken for this visit.There is no height or weight on file to calculate BMI.    General: Appears well and comfortable  Eyes: No scleral or conjunctival lesions  ENT: No apparent oral or nasal lesions  Head/Neck: No apparent scalp or neck lesions  Cardiovascular: Regular rate and rhythm  Respiratory: Breathing quiet and unlabored  Gastrointestinal: No apparent organomegaly or abdominal masses  Integumentary: No significant cutaneous lesions on exposed skin  Musculoskeletal: No significant swelling, tenderness, warmth or limitations of motion at other *** joints examined  Neurologic: Grossly non-focal  Psychiatric: Mood and affect appropriate      LABORATORY RESULTS REVIEWED AND INTERPRETED BY ME:  Lab Results   Component Value Date/Time    CREACTPROT 1.92 (H) 05/19/2023 07:19 AM    SEDRATEWES 18 12/23/2024 03:37 PM    URICACID 4.3 10/28/2024 09:56 AM     Lab Results   Component Value Date/Time    RHEUMFACTN 100 (H) 05/19/2023 07:19 AM    CCPANTIBODY 10 05/19/2023 07:19 AM     Lab Results   Component Value Date/Time    ANTINUCAB None Detected 06/05/2024 06:58 AM     Lab Results   Component Value Date/Time    SSA60 1 08/18/2023 07:02 AM    SSA52 5 08/18/2023 07:02 AM    ANTISSBSJ 1 08/18/2023 07:02 AM     Lab Results   Component Value Date/Time    ANTIMITOCHO 5.8 11/30/2023 08:28 AM    FACTIN 37 (H) 06/05/2024 06:58 AM     Lab Results   Component Value Date/Time    TSHULTRASEN 1.300 05/19/2023 07:19 AM     Lab Results   Component Value Date/Time    FERRITIN 15.3 07/03/2025 10:21 AM    IRON 63 09/19/2024 06:47 AM    G6PD 17.6 (H) 08/18/2023 07:02 AM    PROTHROMBTM 14.5 07/03/2025 10:21 AM    INR 1.12 07/03/2025 10:21 AM     Lab Results   Component  Value Date/Time    WBC 7.3 07/03/2025 10:21 AM    RBC 4.92 07/03/2025 10:21 AM    HEMOGLOBIN 11.2 (L) 07/03/2025 10:21 AM    HEMATOCRIT 37.9 07/03/2025 10:21 AM    MCV 77.0 (L) 07/03/2025 10:21 AM    MCH 22.8 (L) 07/03/2025 10:21 AM    MCHC 29.6 (L) 07/03/2025 10:21 AM    RDW 49.9 07/03/2025 10:21 AM    PLATELETCT 360 07/03/2025 10:21 AM    MPV 11.5 07/03/2025 10:21 AM    NEUTS 3.51 06/25/2025 11:44 AM    LYMPHOCYTES 35.20 06/25/2025 11:44 AM    MONOCYTES 6.60 06/25/2025 11:44 AM    EOSINOPHILS 1.40 06/25/2025 11:44 AM    BASOPHILS 0.80 06/25/2025 11:44 AM     Lab Results   Component Value Date/Time    ASTSGOT 37 06/25/2025 11:44 AM    ALTSGPT 33 06/25/2025 11:44 AM    ALKPHOSPHAT 71 06/25/2025 11:44 AM    TBILIRUBIN 0.7 06/25/2025 11:44 AM    TOTPROTEIN 8.0 06/25/2025 11:44 AM    ALBUMIN 4.4 06/25/2025 11:44 AM     Lab Results   Component Value Date/Time    SODIUM 138 06/25/2025 11:44 AM    POTASSIUM 3.4 (L) 06/25/2025 11:44 AM    CHLORIDE 102 06/25/2025 11:44 AM    CO2 23 06/25/2025 11:44 AM    GLUCOSE 92 06/25/2025 11:44 AM    BUN 9 06/25/2025 11:44 AM    CREATININE 0.64 06/25/2025 11:44 AM    CALCIUM 9.3 06/25/2025 11:44 AM     Lab Results   Component Value Date/Time    COLORURINE Yellow 06/25/2025 11:44 AM    SPECGRAVITY 1.007 06/25/2025 11:44 AM    PHURINE 6.0 06/25/2025 11:44 AM    GLUCOSEUR Negative 06/25/2025 11:44 AM    KETONES Negative 06/25/2025 11:44 AM    PROTEINURIN Negative 06/25/2025 11:44 AM     Lab Results   Component Value Date/Time    HEPBSAG Non-Reactive 08/18/2023 07:02 AM    HEPBCORIGM Non-Reactive 08/18/2023 07:02 AM    HEPCAB Non-Reactive 08/18/2023 07:02 AM     Lab Results   Component Value Date/Time    CHOLSTRLTOT 162 03/17/2022 07:35 AM    LDL 74 03/17/2022 07:35 AM    HDL 53 03/17/2022 07:35 AM    TRIGLYCERIDE 174 (H) 03/17/2022 07:35 AM    HBA1C 5.7 (H) 03/18/2024 03:56 PM       RADIOLOGY RESULTS REVIEWED AND INTERPRETED BY ME: See above      All relevant laboratory and imaging results  reported on this note were reviewed and interpreted by me.         Assessment & Plan     Lisbeth Hernández is a 30 y.o. female with history as noted above whose presentation merits the following clinical impressions and recommendations:    There are no diagnoses linked to this encounter.     1. Seropositive rheumatoid arthritis (HCC)  RF (100)  Nonerosive disease.  Diagnosed with rheumatoid arthritis in 2023 when she presented with polyarthritis.  She has been on multiple conventional and biologic DMARD therapy, most were ineffective.  Presently on adalimumab (Amjevita) which seems to be ineffective as she has signs of active synovitis on exam today.  Suspect that she may have developed antidrug antibodies to adalimumab so will check that as below.  Reasonable to start a short course of steroid for more immediate relief and transition to certolizumab (previously effective) especially given future plans for pregnancy.  Close monitoring of liver enzymes while on this medication as it was previously discontinued due to elevated LFTs.  - Start certolizumab pegol (CIMZIA, 2 SYRINGE,) 200 MG/ML Prefilled Syringe Kit; Inject 200 mg under the skin every 14 days.  Dispense: 2 mL; Refill: 5  - Start predniSONE (DELTASONE) 10 MG Tab; Take 3 tablets every day for 7 days, then decrease by 0.5 tablet every 7 days until finished. Take in the morning with food.  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  The presence of this antibody indicates risk for autoimmune liver disease. If she develops acute hepatitis and/or abnormality on liver imaging,  referral to GI will be appropriate. Nothing to do at this time from rheumatology standpoint.      3. Immunosuppressed status (HCC)  Presently with no history, physical, or laboratory evidence to suggest significant adverse drug effects or opportunistic infections, but need routine monitoring per guidelines.     4. NSAID long-term use  Advised to avoid NSAIDs while on steroid treatment.    The above assessment and plan were discussed with the patient who acknowledged understanding of the plan.    FOLLOW-UP: No follow-ups on file.         Thank you for the opportunity to participate in the care of Lisbeth Hernández.    Ines Berumen D.O.  Rheumatologist, [facility], [facility]          [1]   Past Medical History:  Diagnosis Date    Cyclothymia     Iron deficiency anemia due to chronic blood loss 2020   [2]   Past Surgical History:  Procedure Laterality Date    MI REPAIR OF NASAL SEPTUM  2010    RHINOPLASTY  2009    SUBMANDIBLE GLAND EXCISION      TUMOR EXCISION WITH BIOPSY  ;     benign tumors under tongue X3 surgeries   [3]   Allergies  Allergen Reactions    Plaquenil [Hydroxychloroquine] Itching    Sulfa Drugs      Family history of allergy, pt has not taken   [4]   Social History  Socioeconomic History    Marital status: Single    Number of children: 1    Highest education level: Associate degree: occupational, technical, or vocational program   Tobacco Use    Smoking status: Former     Current packs/day: 0.00     Average packs/day: 0.1 packs/day for 2.0 years (0.2 ttl pk-yrs)     Types: Cigarettes     Start date: 2014     Quit date: 2016     Years since quittin.1    Smokeless tobacco: Never   Vaping Use    Vaping status: Never Used   Substance and Sexual Activity    Alcohol use: Not Currently     Comment: 2-3 drinks twice a month    Drug use: Yes     Types: Inhaled     Comment: weed    Sexual activity: Not Currently     Partners: Male     Birth  control/protection: Condom, Pill     Social Drivers of Health     Financial Resource Strain: Low Risk  (3/28/2023)    Overall Financial Resource Strain (CARDIA)     Difficulty of Paying Living Expenses: Not very hard   Food Insecurity: No Food Insecurity (3/28/2023)    Hunger Vital Sign     Worried About Running Out of Food in the Last Year: Never true     Ran Out of Food in the Last Year: Never true   Transportation Needs: No Transportation Needs (3/28/2023)    PRAPARE - Transportation     Lack of Transportation (Medical): No     Lack of Transportation (Non-Medical): No   Physical Activity: Inactive (3/28/2023)    Exercise Vital Sign     Days of Exercise per Week: 3 days     Minutes of Exercise per Session: 0 min   Stress: Stress Concern Present (3/28/2023)    [employer] - Occupational Stress Questionnaire     Feeling of Stress : To some extent   Social Connections: Socially Isolated (3/28/2023)    Social Connection and Isolation Panel [NHANES]     Frequency of Communication with Friends and Family: Once a week     Frequency of Social Gatherings with Friends and Family: Once a week     Attends Sabianism Services: Never     Active Member of Clubs or Organizations: No     Attends Club or Organization Meetings: Never     Marital Status: Living with partner   Housing Stability: Low Risk  (3/28/2023)    Housing Stability Vital Sign     Unable to Pay for Housing in the Last Year: No     Number of Places Lived in the Last Year: 1     Unstable Housing in the Last Year: No

## 2025-07-18 ENCOUNTER — PHARMACY VISIT (OUTPATIENT)
Dept: PHARMACY | Facility: MEDICAL CENTER | Age: 30
End: 2025-07-18
Payer: COMMERCIAL

## 2025-07-28 ENCOUNTER — TELEPHONE (OUTPATIENT)
Dept: RHEUMATOLOGY | Facility: MEDICAL CENTER | Age: 30
End: 2025-07-28
Payer: MEDICAID

## 2025-07-30 DIAGNOSIS — R12 HEARTBURN: ICD-10-CM

## 2025-07-30 RX ORDER — PANTOPRAZOLE SODIUM 40 MG/1
40 TABLET, DELAYED RELEASE ORAL DAILY
Qty: 30 TABLET | Refills: 5 | Status: SHIPPED | OUTPATIENT
Start: 2025-07-30

## 2025-07-30 NOTE — TELEPHONE ENCOUNTER
Received request via: Pharmacy    Was the patient seen in the last year in this department? Yes    Does the patient have an active prescription (recently filled or refills available) for medication(s) requested? No    Pharmacy Name: [facility]    Does the patient have MCFP Plus and need 100-day supply? (This applies to ALL medications) Patient does not have SCP    Future Appointments         Provider [facility]    8/6/2025 10:20 AM (Arrive by 10:05 AM) Eugenia Agudelo M.D. [facility]    8/7/2025 8:15 AM SEKOU Vasquez [facility]    8/13/2025 10:45 AM (Arrive by 10:30 AM) Lianet Keys M.D. [facility]    11/13/2025 9:30 AM (Arrive by 9:15 AM) ELANA Coy Rheumatology

## 2025-08-02 DIAGNOSIS — M05.9 SEROPOSITIVE RHEUMATOID ARTHRITIS (HCC): Primary | ICD-10-CM

## 2025-08-02 RX ORDER — PREDNISONE 5 MG/1
TABLET ORAL
Qty: 70 TABLET | Refills: 0 | Status: SHIPPED | OUTPATIENT
Start: 2025-08-02

## 2025-08-05 ASSESSMENT — RHEUMATOLOGY FOLLOW-UP QUESTIONNAIRE
FATIGUE: Y
DURATION: 30-60 MINS
SUNLIGHT-INDUCED SKIN RASH: FACE
TEMPLE PAIN: Y
NAUSEA: Y
HEADACHES: Y
JOINT SWELLING: Y
HAIR SHEDDING: Y
NUMBNESS: Y
TENDON PAIN: Y
PELVIC PAIN: Y
ANXIETY: Y
BODY ACHES: Y
MARK ALL THE AREAS OF PAIN: 123895852
EYE PAIN: Y
VOMITING: Y
LYMPH NODE SWELLING: ARMPITS
INSOMNIA: Y
JOINT PAIN: SAME WITH ACTIVITY
SUNLIGHT-INDUCED SKIN RASH: NECK
LYMPH NODE SWELLING: NECK
WEAKNESS: Y
DEPRESSION: Y
MUSCLE PAIN: Y
CHARACTERISTIC: SAME WITH ACTIVITY
TINGLING: Y
JOINT INSTABILITY: Y
EXACERBATING_FACTORS: STRESS
DIZZINESS: Y
RATE_1TO10: 8

## 2025-08-06 ENCOUNTER — OFFICE VISIT (OUTPATIENT)
Dept: RHEUMATOLOGY | Facility: MEDICAL CENTER | Age: 30
End: 2025-08-06
Attending: STUDENT IN AN ORGANIZED HEALTH CARE EDUCATION/TRAINING PROGRAM
Payer: MEDICAID

## 2025-08-06 ENCOUNTER — SPECIALTY PHARMACY (OUTPATIENT)
Dept: PHARMACY | Facility: MEDICAL CENTER | Age: 30
End: 2025-08-06
Payer: MEDICAID

## 2025-08-06 VITALS
TEMPERATURE: 97.2 F | BODY MASS INDEX: 29.66 KG/M2 | HEIGHT: 67 IN | OXYGEN SATURATION: 97 % | DIASTOLIC BLOOD PRESSURE: 70 MMHG | SYSTOLIC BLOOD PRESSURE: 110 MMHG | WEIGHT: 189 LBS | HEART RATE: 68 BPM | RESPIRATION RATE: 16 BRPM

## 2025-08-06 DIAGNOSIS — M05.9 SEROPOSITIVE RHEUMATOID ARTHRITIS (HCC): Primary | ICD-10-CM

## 2025-08-06 DIAGNOSIS — R76.8 SEROLOGIC AUTOIMMUNITY: ICD-10-CM

## 2025-08-06 DIAGNOSIS — Z79.1 NSAID LONG-TERM USE: ICD-10-CM

## 2025-08-06 DIAGNOSIS — D84.9 IMMUNOSUPPRESSED STATUS (HCC): ICD-10-CM

## 2025-08-06 PROCEDURE — 99213 OFFICE O/P EST LOW 20 MIN: CPT | Performed by: STUDENT IN AN ORGANIZED HEALTH CARE EDUCATION/TRAINING PROGRAM

## 2025-08-06 PROCEDURE — 3078F DIAST BP <80 MM HG: CPT | Performed by: STUDENT IN AN ORGANIZED HEALTH CARE EDUCATION/TRAINING PROGRAM

## 2025-08-06 PROCEDURE — 99214 OFFICE O/P EST MOD 30 MIN: CPT | Performed by: STUDENT IN AN ORGANIZED HEALTH CARE EDUCATION/TRAINING PROGRAM

## 2025-08-06 PROCEDURE — 3074F SYST BP LT 130 MM HG: CPT | Performed by: STUDENT IN AN ORGANIZED HEALTH CARE EDUCATION/TRAINING PROGRAM

## 2025-08-06 PROCEDURE — RXMED WILLOW AMBULATORY MEDICATION CHARGE: Performed by: STUDENT IN AN ORGANIZED HEALTH CARE EDUCATION/TRAINING PROGRAM

## 2025-08-06 PROCEDURE — RXMED WILLOW AMBULATORY MEDICATION CHARGE: Performed by: NURSE PRACTITIONER

## 2025-08-06 RX ORDER — METHOTREXATE 2.5 MG/1
15 TABLET ORAL
Qty: 72 TABLET | Refills: 3 | Status: SHIPPED | OUTPATIENT
Start: 2025-08-06

## 2025-08-06 RX ORDER — FOLIC ACID 1 MG/1
1 TABLET ORAL DAILY
Qty: 90 TABLET | Refills: 3 | Status: SHIPPED | OUTPATIENT
Start: 2025-08-06

## 2025-08-06 ASSESSMENT — FIBROSIS 4 INDEX: FIB4 SCORE: 0.54

## 2025-08-08 ENCOUNTER — PHARMACY VISIT (OUTPATIENT)
Dept: PHARMACY | Facility: MEDICAL CENTER | Age: 30
End: 2025-08-08
Payer: COMMERCIAL

## 2025-08-08 PROBLEM — M05.79 RHEUMATOID ARTHRITIS INVOLVING MULTIPLE SITES WITH POSITIVE RHEUMATOID FACTOR (HCC): Status: RESOLVED | Noted: 2023-06-01 | Resolved: 2025-08-08

## 2025-08-08 PROBLEM — M05.9 SEROPOSITIVE RHEUMATOID ARTHRITIS (HCC): Status: ACTIVE | Noted: 2025-08-08

## 2025-09-02 ENCOUNTER — APPOINTMENT (OUTPATIENT)
Dept: MEDICAL GROUP | Facility: MEDICAL CENTER | Age: 30
End: 2025-09-02
Payer: MEDICAID